# Patient Record
Sex: MALE | Race: WHITE | NOT HISPANIC OR LATINO | Employment: OTHER | URBAN - METROPOLITAN AREA
[De-identification: names, ages, dates, MRNs, and addresses within clinical notes are randomized per-mention and may not be internally consistent; named-entity substitution may affect disease eponyms.]

---

## 2017-03-09 ENCOUNTER — OFFICE VISIT (OUTPATIENT)
Dept: FAMILY MEDICINE | Facility: CLINIC | Age: 67
End: 2017-03-09
Payer: MEDICARE

## 2017-03-09 VITALS
HEIGHT: 67 IN | HEART RATE: 85 BPM | OXYGEN SATURATION: 95 % | WEIGHT: 179 LBS | DIASTOLIC BLOOD PRESSURE: 76 MMHG | SYSTOLIC BLOOD PRESSURE: 125 MMHG | BODY MASS INDEX: 28.09 KG/M2

## 2017-03-09 DIAGNOSIS — G40.909 SEIZURE DISORDER (H): ICD-10-CM

## 2017-03-09 DIAGNOSIS — R73.9 HYPERGLYCEMIA: ICD-10-CM

## 2017-03-09 DIAGNOSIS — E78.5 HYPERLIPIDEMIA LDL GOAL <130: Primary | ICD-10-CM

## 2017-03-09 PROCEDURE — 99214 OFFICE O/P EST MOD 30 MIN: CPT | Performed by: FAMILY MEDICINE

## 2017-03-09 RX ORDER — PHENYTOIN SODIUM 100 MG/1
100 CAPSULE, EXTENDED RELEASE ORAL DAILY
Qty: 90 CAPSULE | Refills: 3 | Status: SHIPPED | OUTPATIENT
Start: 2017-03-09 | End: 2018-03-05

## 2017-03-09 ASSESSMENT — PAIN SCALES - GENERAL: PAINLEVEL: NO PAIN (0)

## 2017-03-09 NOTE — MR AVS SNAPSHOT
"              After Visit Summary   3/9/2017    Jordan Kennedy    MRN: [id number]           Patient Information     Date Of Birth          1950        Visit Information        Provider Department      3/9/2017 4:00 PM Kasi Edwards MD [facility]        Today's Diagnoses     Hyperlipidemia LDL goal <130    -  1    Seizure disorder (H)        Hyperglycemia           Follow-ups after your visit        Future tests that were ordered for you today     Open Future Orders        Priority Expected Expires Ordered    Basic metabolic panel Routine 3/10/2017 4/9/2017 3/9/2017    Lipid panel reflex to direct LDL Routine 3/10/2017 4/9/2017 3/9/2017            Who to contact     If you have questions or need follow up information about today's clinic visit or your schedule please contact [facility] directly at [phone].  Normal or non-critical lab and imaging results will be communicated to you by [employer], letter or phone within 4 business days after the clinic has received the results. If you do not hear from us within 7 days, please contact the clinic through [employer] or phone. If you have a critical or abnormal lab result, we will notify you by phone as soon as possible.  Submit refill requests through [employer] or call your pharmacy and they will forward the refill request to us. Please allow 3 business days for your refill to be completed.          Additional Information About Your Visit        MyChart Information     [employer] lets you send messages to your doctor, view your test results, renew your prescriptions, schedule appointments and more. To sign up, go to [email or web address] . Click on \"Log in\" on the left side of the screen, which will take you to the Welcome page. Then click on \"Sign up Now\" on the right side of the page.     You will be asked to enter the access code listed below, as well as some personal information. Please follow the directions " "to create your username and password.     Your access code is: [email or web address]  Expires: 2017  4:31 PM     Your access code will  in 90 days. If you need help or a new code, please call your [facility] or [phone].        Care EveryWhere ID     This is your Care EveryWhere ID. This could be used by other organizations to access your [place] medical records  [id number]        Your Vitals Were     Pulse Height Pulse Oximetry BMI (Body Mass Index)          85 5' 6.5\" (1.689 m) 95% 28.46 kg/m2         Blood Pressure from Last 3 Encounters:   17 125/76   10/23/14 134/89   04/15/13 128/85    Weight from Last 3 Encounters:   17 179 lb (81.2 kg)   10/23/14 170 lb (77.1 kg)   04/15/13 173 lb 8 oz (78.7 kg)                 Where to get your medicines      These medications were sent to [id number] - [place], [place] - [address] AT [facility] [address]  [address], [place] [address]     Phone:  [phone]     phenytoin 100 MG CR capsule          Primary Care Provider    None Specified       No primary provider on file.        Thank you!     Thank you for choosing [facility]  for your care. Our goal is always to provide you with excellent care. Hearing back from our patients is one way we can continue to improve our services. Please take a few minutes to complete the written survey that you may receive in the mail after your visit with us. Thank you!             Your Updated Medication List - Protect others around you: Learn how to safely use, store and throw away your medicines at www.disposemymeds.org.          This list is accurate as of: 3/9/17  4:31 PM.  Always use your most recent med list.                   Brand Name Dispense Instructions for use    lovastatin 40 MG tablet    MEVACOR    90 tablet    Take 1 tablet (40 mg) by mouth At Bedtime       phenytoin 100 MG CR capsule    DILANTIN    90 capsule    Take 1 " capsule (100 mg) by mouth daily (Due for physical and labs for more refills)

## 2017-03-09 NOTE — NURSING NOTE
"Chief Complaint   Patient presents with     Establish Care     Recheck Medication       Initial /76 (BP Location: Left arm, Patient Position: Chair, Cuff Size: Adult Regular)  Pulse 85  Ht 5' 6.5\" (1.689 m)  Wt 179 lb (81.2 kg)  SpO2 95%  BMI 28.46 kg/m2 Estimated body mass index is 28.46 kg/(m^2) as calculated from the following:    Height as of this encounter: 5' 6.5\" (1.689 m).    Weight as of this encounter: 179 lb (81.2 kg).  Medication Reconciliation: complete   Sánchez Yeung CMA      "

## 2017-03-09 NOTE — PROGRESS NOTES
"  SUBJECTIVE:                                                    Jordan Kennedy is a 66 year old male who presents to clinic today for the following health issues:      Medication Followup of Phenytoin     Taking Medication as prescribed: yes    Side Effects:  None    Medication Helping Symptoms:  yes       Patient has a history of seizures   He has been on it for 2 months ago ; in checking the chart he has been on this for years   He falls over and he does not where he was.     Patient used to drink , but not too much   His last drink     He has not had a seizure for 50 years     His dilantin levels are subtherapeutic     O: /76 (BP Location: Left arm, Patient Position: Chair, Cuff Size: Adult Regular)  Pulse 85  Ht 5' 6.5\" (1.689 m)  Wt 179 lb (81.2 kg)  SpO2 95%  BMI 28.46 kg/m2    Head: Normocephalic, atraumatic.  Eyes: Conjunctiva clear, non icteric. PERRLA.  Ears: External ears and TMs normal BL.  Nose: Septum midline, nasal mucosa pink and moist. No discharge.  Mouth / Throat: Normal dentition.  No oral lesions. Pharynx non erythematous, tonsils without hypertrophy.  Neck: Supple, no enlarged LN, trachea midline.    Chest wall normal to inspection and palpation. Good excursion bilaterally. Lungs clear to auscultation. Good air movement bilaterally without rales, wheezes, or rhonchi.   Regular rate and  rhythm. S1 and S2 normal, no murmurs, clicks, gallops or rubs. No edema or JVD.    The abdomen is soft without tenderness, guarding, mass, rebound or organomegaly. Bowel sounds are normal. No CVA tenderness or inguinal adenopathy noted.      ICD-10-CM    1. Hyperlipidemia LDL goal <130 E78.5 Lipid panel reflex to direct LDL   2. Seizure disorder (H) G40.909 phenytoin (DILANTIN) 100 MG CR capsule   3. Hyperglycemia R73.9 Basic metabolic panel           "

## 2017-03-24 DIAGNOSIS — R73.9 HYPERGLYCEMIA: ICD-10-CM

## 2017-03-24 DIAGNOSIS — E78.5 HYPERLIPIDEMIA LDL GOAL <130: ICD-10-CM

## 2017-03-24 LAB
ANION GAP SERPL CALCULATED.3IONS-SCNC: 12 MMOL/L (ref 3–14)
BUN SERPL-MCNC: 12 MG/DL (ref 7–30)
CALCIUM SERPL-MCNC: 9.6 MG/DL (ref 8.5–10.1)
CHLORIDE SERPL-SCNC: 103 MMOL/L (ref 94–109)
CHOLEST SERPL-MCNC: 238 MG/DL
CO2 SERPL-SCNC: 26 MMOL/L (ref 20–32)
CREAT SERPL-MCNC: 0.88 MG/DL (ref 0.66–1.25)
GFR SERPL CREATININE-BSD FRML MDRD: 86 ML/MIN/1.7M2
GLUCOSE SERPL-MCNC: 100 MG/DL (ref 70–99)
HDLC SERPL-MCNC: 42 MG/DL
LDLC SERPL CALC-MCNC: 148 MG/DL
NONHDLC SERPL-MCNC: 196 MG/DL
POTASSIUM SERPL-SCNC: 4 MMOL/L (ref 3.4–5.3)
SODIUM SERPL-SCNC: 141 MMOL/L (ref 133–144)
TRIGL SERPL-MCNC: 241 MG/DL

## 2017-03-24 PROCEDURE — 36415 COLL VENOUS BLD VENIPUNCTURE: CPT | Performed by: FAMILY MEDICINE

## 2017-03-24 PROCEDURE — 80048 BASIC METABOLIC PNL TOTAL CA: CPT | Performed by: FAMILY MEDICINE

## 2017-03-24 PROCEDURE — 80061 LIPID PANEL: CPT | Performed by: FAMILY MEDICINE

## 2017-03-24 NOTE — LETTER
[facility]   [address] [place]  [address]  [phone]                                   March 31, 2017    Jordan Kennedy  [address] [facility] [address]        Dear Jordan,    Your basic metabolic panel which includes electrolytes,kidney function is normal and  -Glucose (diabetic screening test) is within normal limits  Your cholesterols are in the same rang they were 3 years ago. You should have your statn changed to a stronger medication.  Please call to discuss     Follow up in 6 month(s)    Results for orders placed or performed in visit on 03/24/17   Basic metabolic panel   Result Value Ref Range    Sodium 141 133 - 144 mmol/L    Potassium 4.0 3.4 - 5.3 mmol/L    Chloride 103 94 - 109 mmol/L    Carbon Dioxide 26 20 - 32 mmol/L    Anion Gap 12 3 - 14 mmol/L    Glucose 100 (H) 70 - 99 mg/dL    Urea Nitrogen 12 7 - 30 mg/dL    Creatinine 0.88 0.66 - 1.25 mg/dL    GFR Estimate 86 >60 mL/min/1.7m2    GFR Estimate If Black >90   GFR Calc   >60 mL/min/1.7m2    Calcium 9.6 8.5 - 10.1 mg/dL   Lipid panel reflex to direct LDL   Result Value Ref Range    Cholesterol 238 (H) <200 mg/dL    Triglycerides 241 (H) <150 mg/dL    HDL Cholesterol 42 >39 mg/dL    LDL Cholesterol Calculated 148 (H) <100 mg/dL    Non HDL Cholesterol 196 (H) <130 mg/dL       If you have any questions please call the clinic at [phone]    Sincerely,    Kasi Edwards MD  bmd

## 2017-03-31 NOTE — PROGRESS NOTES
Your basic metabolic panel which includes electrolytes,kidney function is normal and  -Glucose (diabetic screening test) is within normal limits  Your cholesterols are in the same rang they were 3 years ago. You should have your statn changed to a stronger medication.  Please call to discuss     Follow up in 6 month(s)

## 2018-03-05 ENCOUNTER — OFFICE VISIT (OUTPATIENT)
Dept: FAMILY MEDICINE | Facility: CLINIC | Age: 68
End: 2018-03-05
Payer: MEDICARE

## 2018-03-05 VITALS
HEIGHT: 67 IN | HEART RATE: 102 BPM | TEMPERATURE: 97 F | OXYGEN SATURATION: 94 % | BODY MASS INDEX: 28.41 KG/M2 | SYSTOLIC BLOOD PRESSURE: 126 MMHG | DIASTOLIC BLOOD PRESSURE: 84 MMHG | WEIGHT: 181 LBS

## 2018-03-05 DIAGNOSIS — G40.909 SEIZURE DISORDER (H): Primary | ICD-10-CM

## 2018-03-05 DIAGNOSIS — E78.5 HYPERLIPIDEMIA LDL GOAL <130: ICD-10-CM

## 2018-03-05 DIAGNOSIS — Z12.11 SPECIAL SCREENING FOR MALIGNANT NEOPLASMS, COLON: ICD-10-CM

## 2018-03-05 LAB
ANION GAP SERPL CALCULATED.3IONS-SCNC: 9 MMOL/L (ref 3–14)
BUN SERPL-MCNC: 21 MG/DL (ref 7–30)
CALCIUM SERPL-MCNC: 9.1 MG/DL (ref 8.5–10.1)
CHLORIDE SERPL-SCNC: 108 MMOL/L (ref 94–109)
CHOLEST SERPL-MCNC: 225 MG/DL
CO2 SERPL-SCNC: 24 MMOL/L (ref 20–32)
CREAT SERPL-MCNC: 0.86 MG/DL (ref 0.66–1.25)
GFR SERPL CREATININE-BSD FRML MDRD: 88 ML/MIN/1.7M2
GLUCOSE SERPL-MCNC: 106 MG/DL (ref 70–99)
HDLC SERPL-MCNC: 42 MG/DL
LDLC SERPL CALC-MCNC: 142 MG/DL
NONHDLC SERPL-MCNC: 183 MG/DL
PHENYTOIN SERPL-MCNC: 3.8 MG/L (ref 10–20)
POTASSIUM SERPL-SCNC: 3.9 MMOL/L (ref 3.4–5.3)
SODIUM SERPL-SCNC: 141 MMOL/L (ref 133–144)
TRIGL SERPL-MCNC: 205 MG/DL

## 2018-03-05 PROCEDURE — 80185 ASSAY OF PHENYTOIN TOTAL: CPT | Performed by: FAMILY MEDICINE

## 2018-03-05 PROCEDURE — 36415 COLL VENOUS BLD VENIPUNCTURE: CPT | Performed by: FAMILY MEDICINE

## 2018-03-05 PROCEDURE — 99213 OFFICE O/P EST LOW 20 MIN: CPT | Performed by: FAMILY MEDICINE

## 2018-03-05 PROCEDURE — 80061 LIPID PANEL: CPT | Performed by: FAMILY MEDICINE

## 2018-03-05 PROCEDURE — 80048 BASIC METABOLIC PNL TOTAL CA: CPT | Performed by: FAMILY MEDICINE

## 2018-03-05 RX ORDER — PHENYTOIN SODIUM 100 MG/1
100 CAPSULE, EXTENDED RELEASE ORAL DAILY
Qty: 90 CAPSULE | Refills: 3 | Status: SHIPPED | OUTPATIENT
Start: 2018-03-05 | End: 2019-03-02

## 2018-03-05 RX ORDER — MULTIVITAMIN
1 TABLET ORAL DAILY
COMMUNITY

## 2018-03-05 NOTE — LETTER
[facility]  [address] [place]  [address]  [phone]        March 6, 2018    Jordan Kennedy  [address] [facility] [address]        Dear Jordan,    Your dilantin level remains quite low.  You are at risk for having a seizure if this is not normal   Please verify that you are taking your dose of medication so we can make adjustments     Your basic metabolic panel which includes electrolytes,kidney function is normal and  -Glucose (diabetic screening test) is elevated, mildly     Your cholesterols remain in the moderately elevated level   You need to restart your mevacor and recheck this in three months     Follow up in 3 month(s)    Results for orders placed or performed in visit on 03/05/18   Basic metabolic panel   Result Value Ref Range    Sodium 141 133 - 144 mmol/L    Potassium 3.9 3.4 - 5.3 mmol/L    Chloride 108 94 - 109 mmol/L    Carbon Dioxide 24 20 - 32 mmol/L    Anion Gap 9 3 - 14 mmol/L    Glucose 106 (H) 70 - 99 mg/dL    Urea Nitrogen 21 7 - 30 mg/dL    Creatinine 0.86 0.66 - 1.25 mg/dL    GFR Estimate 88 >60 mL/min/1.7m2    GFR Estimate If Black >90 >60 mL/min/1.7m2    Calcium 9.1 8.5 - 10.1 mg/dL   Lipid panel reflex to direct LDL Fasting   Result Value Ref Range    Cholesterol 225 (H) <200 mg/dL    Triglycerides 205 (H) <150 mg/dL    HDL Cholesterol 42 >39 mg/dL    LDL Cholesterol Calculated 142 (H) <100 mg/dL    Non HDL Cholesterol 183 (H) <130 mg/dL   Phenytoin level   Result Value Ref Range    Phenytoin Level 3.8 (L) 10 - 20 mg/L       If you have any questions please call the clinic at [phone].    Sincerely,    Kasi Edwards MD  NMW

## 2018-03-05 NOTE — NURSING NOTE
"Chief Complaint   Patient presents with     Recheck Medication       Initial BP (!) 136/93 (BP Location: Right arm, Patient Position: Chair, Cuff Size: Adult Regular)  Pulse 102  Temp 97  F (36.1  C) (Oral)  Ht 5' 7.32\" (1.71 m)  Wt 181 lb (82.1 kg)  SpO2 94%  BMI 28.08 kg/m2 Estimated body mass index is 28.08 kg/(m^2) as calculated from the following:    Height as of this encounter: 5' 7.32\" (1.71 m).    Weight as of this encounter: 181 lb (82.1 kg).  Medication Reconciliation: complete   Aurelia See ERIN Dupree      "

## 2018-03-05 NOTE — MR AVS SNAPSHOT
"              After Visit Summary   3/5/2018    Jordan Kennedy    MRN: [id number]           Patient Information     Date Of Birth          1950        Visit Information        Provider Department      3/5/2018 8:40 AM [facility]        Today's Diagnoses     Seizure disorder (H)    -  1    Hyperlipidemia LDL goal <130        Special screening for malignant neoplasms, colon           Follow-ups after your visit        Future tests that were ordered for you today     Open Future Orders        Priority Expected Expires Ordered    Fecal colorectal cancer screen (FIT) Routine 3/26/2018 5/28/2018 3/5/2018            Who to contact     If you have questions or need follow up information about today's clinic visit or your schedule please contact [facility] directly at [phone].  Normal or non-critical lab and imaging results will be communicated to you by MyChart, letter or phone within 4 business days after the clinic has received the results. If you do not hear from us within 7 days, please contact the clinic through MyChart or phone. If you have a critical or abnormal lab result, we will notify you by phone as soon as possible.  Submit refill requests through [employer] or call your pharmacy and they will forward the refill request to us. Please allow 3 business days for your refill to be completed.          Additional Information About Your Visit        MyChart Information     [employer] lets you send messages to your doctor, view your test results, renew your prescriptions, schedule appointments and more. To sign up, go to [email or web address] . Click on \"Log in\" on the left side of the screen, which will take you to the Welcome page. Then click on \"Sign up Now\" on the right side of the page.     You will be asked to enter the access code listed below, as well as some personal information. Please follow the directions to create your " "username and password.     Your access code is: [email or web address]  Expires: 6/3/2018  8:54 AM     Your access code will  in 90 days. If you need help or a new code, please call your [place] clinic or [phone].        Care EveryWhere ID     This is your Care EveryWhere ID. This could be used by other organizations to access your [place] medical records  [id number]        Your Vitals Were     Pulse Temperature Height Pulse Oximetry BMI (Body Mass Index)       102 97  F (36.1  C) (Oral) 5' 7.32\" (1.71 m) 94% 28.08 kg/m2        Blood Pressure from Last 3 Encounters:   18 126/84   17 125/76   10/23/14 134/89    Weight from Last 3 Encounters:   18 181 lb (82.1 kg)   17 179 lb (81.2 kg)   10/23/14 170 lb (77.1 kg)              We Performed the Following     Basic metabolic panel     Lipid panel reflex to direct LDL Fasting     Phenytoin level          Where to get your medicines      These medications were sent to [id number] - [place], [place] - [address] AT [facility] [address]  [address], [place] [address]     Phone:  [phone]     phenytoin 100 MG CR capsule          Primary Care Provider Office Phone # Fax #    Kasi Edwards [phone]       [address] [facility] [address]        Equal Access to Services     CHACORTA SIU AH: Hadii vicki ku hadasho Soomaali, waaxda luqadaha, qaybta kaalmada adeegyada, nannette smith adeviky kovacs. So [facility] [phone].    ATENCIÓN: Si habla español, tiene a canada disposición servicios gratuitos de asistencia lingüística. Llame al [phone].    We comply with applicable federal civil rights laws and [place] laws. We do not discriminate on the basis of race, color, national origin, age, disability, sex, sexual orientation, or gender identity.            Thank you!     Thank you for choosing [facility]  for your care. Our " goal is always to provide you with excellent care. Hearing back from our patients is one way we can continue to improve our services. Please take a few minutes to complete the written survey that you may receive in the mail after your visit with us. Thank you!             Your Updated Medication List - Protect others around you: Learn how to safely use, store and throw away your medicines at www.disposemymeds.org.          This list is accurate as of 3/5/18  8:54 AM.  Always use your most recent med list.                   Brand Name Dispense Instructions for use Diagnosis    lovastatin 40 MG tablet    MEVACOR    90 tablet    Take 1 tablet (40 mg) by mouth At Bedtime    Hyperlipidemia LDL goal <130       Multi-vitamin Tabs tablet      Take 1 tablet by mouth daily        phenytoin 100 MG CR capsule    DILANTIN    90 capsule    Take 1 capsule (100 mg) by mouth daily (Due for physical and labs for more refills)    Seizure disorder (H)

## 2018-03-05 NOTE — PROGRESS NOTES
"  SUBJECTIVE:   Jordan Kennedy is a 67 year old male who presents to clinic today for the following health issues:      Medication Followup    Taking Medication as prescribed: yes    Side Effects:  None    Medication Helping Symptoms:  yes       History of seizures   Currently on Dilantin     Ros: no chest pain, chest tightness   No sob   No leg edema   No abdominal pain     Denies fever or chills   Weight stable     Denies myalgias or bloody urine           Current Outpatient Prescriptions   Medication Sig Dispense Refill     multivitamin, therapeutic with minerals (MULTI-VITAMIN) TABS tablet Take 1 tablet by mouth daily       phenytoin (DILANTIN) 100 MG CR capsule Take 1 capsule (100 mg) by mouth daily (Due for physical and labs for more refills) 90 capsule 3     lovastatin (MEVACOR) 40 MG tablet Take 1 tablet (40 mg) by mouth At Bedtime (Patient not taking: Reported on 3/5/2018) 90 tablet 3         Reviewed and updated as needed this visit by clinical staff  Tobacco  Allergies  Meds  Med Hx  Surg Hx  Fam Hx  Soc Hx      Reviewed and updated as needed this visit by Provider  Meds        Past Medical History:   Diagnosis Date     Seizure disorder (H)        History reviewed. No pertinent surgical history.    Family History   Problem Relation Age of Onset     CANCER Mother      DIABETES Father      Mom had a smoking related cancer     Social History   Substance Use Topics     Smoking status: Never Smoker     Smokeless tobacco: Never Used     Alcohol use Yes      Comment: Once in a while have a beer           O; /84  Pulse 102  Temp 97  F (36.1  C) (Oral)  Ht 5' 7.32\" (1.71 m)  Wt 181 lb (82.1 kg)  SpO2 94%  BMI 28.08 kg/m2    Wt Readings from Last 4 Encounters:   03/05/18 181 lb (82.1 kg)   03/09/17 179 lb (81.2 kg)   10/23/14 170 lb (77.1 kg)   04/15/13 173 lb 8 oz (78.7 kg)       Chest wall normal to inspection and palpation. Good excursion bilaterally. Lungs clear to auscultation. Good air " "movement bilaterally without rales, wheezes, or rhonchi.   Regular rate and  rhythm. S1 and S2 normal, no murmurs, clicks, gallops or rubs. No edema or JVD.    The abdomen is soft without tenderness, guarding, mass, rebound or organomegaly. Bowel sounds are normal. No CVA tenderness or inguinal adenopathy noted.      ICD-10-CM    1. Seizure disorder (H) G40.909 Basic metabolic panel     Phenytoin level     phenytoin (DILANTIN) 100 MG CR capsule   2. Hyperlipidemia LDL goal <130 E78.5 Lipid panel reflex to direct LDL Fasting   3. Special screening for malignant neoplasms, colon Z12.11 Fecal colorectal cancer screen (FIT)       Patient is not taking his lovastatin. \"he does not need it\"   We discussed and wait til labs back           "

## 2018-03-06 NOTE — PROGRESS NOTES
Your dilantin level remains quite low.  You are at risk for having a seizure if this is not normal   Please verify that you are taking your dose of medication so we can make adjustments     Your basic metabolic panel which includes electrolytes,kidney function is normal and  -Glucose (diabetic screening test) is elevated, mildly     Your cholesterols remain in the moderately elevated level   You need to restart your mevacor and recheck this in three months       Follow up in 3 month(s)

## 2018-03-12 DIAGNOSIS — Z12.11 SPECIAL SCREENING FOR MALIGNANT NEOPLASMS, COLON: ICD-10-CM

## 2018-03-12 PROCEDURE — 82274 ASSAY TEST FOR BLOOD FECAL: CPT | Performed by: FAMILY MEDICINE

## 2018-03-12 NOTE — LETTER
[facility]   [address] [place]  [address]  [phone]                                   March 15, 2018    Jordan Kennedy  [address] [facility] [address]        Dear Jordan,    Your FIT testing was normal.   I still recommend that we do colonoscopy since it is the well established screening tool.   There are risks involved with doing a colonoscopy and so since you agreed to do FIT testing, you should continue to do it  yearly for five years.   You need to do it regularly to achieve the same level of confidence as the colonoscopy.  When done correctly, this can be substituted for the colonoscopy  If it turns positive then we would have to proceed with colonoscopy to further evaluate.     Results for orders placed or performed in visit on 03/12/18   Fecal colorectal cancer screen (FIT)   Result Value Ref Range    Occult Blood Scn FIT Negative NEG^Negative       If you have any questions please call the clinic at [phone]    Sincerely,    Kasi Edwards MD  bmd

## 2018-03-15 LAB — HEMOCCULT STL QL IA: NEGATIVE

## 2019-03-02 DIAGNOSIS — G40.909 SEIZURE DISORDER (H): ICD-10-CM

## 2019-03-04 ENCOUNTER — OFFICE VISIT (OUTPATIENT)
Dept: FAMILY MEDICINE | Facility: CLINIC | Age: 69
End: 2019-03-04
Payer: MEDICARE

## 2019-03-04 VITALS
OXYGEN SATURATION: 95 % | DIASTOLIC BLOOD PRESSURE: 81 MMHG | SYSTOLIC BLOOD PRESSURE: 134 MMHG | WEIGHT: 180 LBS | BODY MASS INDEX: 28.25 KG/M2 | TEMPERATURE: 97 F | HEIGHT: 67 IN | HEART RATE: 97 BPM

## 2019-03-04 DIAGNOSIS — E78.5 HYPERLIPIDEMIA LDL GOAL <130: Primary | ICD-10-CM

## 2019-03-04 DIAGNOSIS — G40.909 SEIZURE DISORDER (H): ICD-10-CM

## 2019-03-04 PROCEDURE — 99214 OFFICE O/P EST MOD 30 MIN: CPT | Performed by: FAMILY MEDICINE

## 2019-03-04 RX ORDER — LOVASTATIN 40 MG
40 TABLET ORAL AT BEDTIME
Qty: 90 TABLET | Refills: 0 | Status: SHIPPED | OUTPATIENT
Start: 2019-03-04 | End: 2020-03-02

## 2019-03-04 RX ORDER — PHENYTOIN SODIUM 100 MG/1
CAPSULE, EXTENDED RELEASE ORAL
Qty: 90 CAPSULE | Refills: 0 | Status: SHIPPED | OUTPATIENT
Start: 2019-03-04 | End: 2019-08-24

## 2019-03-04 RX ORDER — PHENYTOIN SODIUM 100 MG/1
CAPSULE, EXTENDED RELEASE ORAL
Qty: 90 CAPSULE | Refills: 0 | Status: SHIPPED | OUTPATIENT
Start: 2019-03-04 | End: 2019-03-04

## 2019-03-04 ASSESSMENT — MIFFLIN-ST. JEOR: SCORE: 1544.06

## 2019-03-04 NOTE — TELEPHONE ENCOUNTER
"Requested Prescriptions   Pending Prescriptions Disp Refills     phenytoin (DILANTIN) 100 MG capsule [Pharmacy Med Name: PHENYTOIN SODIUM 100MG EXT CAPSULES] 90 capsule 0    Last Written Prescription Date:  3/5/18  Last Fill Quantity: 90,  # refills: 3   Last office visit: 3/5/2018 with prescribing provider:     Future Office Visit:     Sig: TAKE 1 CAPSULE(100 MG) BY MOUTH DAILY    Anti-Seizure Meds Protocol  Failed - 3/2/2019  9:35 AM       Failed - Review Authorizing provider's last note.     Refer to last progress notes: confirm request is for original authorizing provider (cannot be through other providers).         Failed - Normal CBC on file in past 26 months    Recent Labs   Lab Test 03/24/11  1009   WBC 4.0   RBC 5.23   HGB 16.4   HCT 49.9                   Failed - Normal ALT or AST on file in past 26 months    Recent Labs   Lab Test 04/15/13  0849   ALT 42     Recent Labs   Lab Test 03/24/11  1009   AST 24            Failed - Normal platelet count on file in past 26 months    Recent Labs   Lab Test 03/24/11  1009                 Passed - Recent (12 mo) or future (30 days) visit within the authorizing provider's specialty    Patient had office visit in the last 12 months or has a visit in the next 30 days with authorizing provider or within the authorizing provider's specialty.  See \"Patient Info\" tab in inbasket, or \"Choose Columns\" in Meds & Orders section of the refill encounter.             Passed - Dilantin level within therapeutic range in past 26 months    Recent Labs   Lab Test 03/05/18  0856   DILANTIN 3.8*       Dilantin level must be checked 2-4 weeks after dosage change.         Passed - Medication is active on med list          "

## 2019-03-04 NOTE — PROGRESS NOTES
"  SUBJECTIVE:   Jordan Kennedy is a 68 year old male who presents to clinic today for the following health issues:      Medication Followup of Dilantin    Taking Medication as prescribed: yes    Side Effects:  None    Medication Helping Symptoms:  yes     Last seizure was years ago    He only takes one pill per day     He no longer sees a neurologist     Patient is taking B vitamins       Ros: no chest pain, chest tightness   No sob   No leg edema   No abdominal pain     Denies fever or chills   Weight stable       O: /81 (BP Location: Right arm, Patient Position: Sitting, Cuff Size: Adult Regular)   Pulse 97   Temp 97  F (36.1  C) (Oral)   Ht 1.708 m (5' 7.25\")   Wt 81.6 kg (180 lb)   SpO2 95%   BMI 27.98 kg/m      Wt Readings from Last 4 Encounters:   03/04/19 81.6 kg (180 lb)   03/05/18 82.1 kg (181 lb)   03/09/17 81.2 kg (179 lb)   10/23/14 77.1 kg (170 lb)     Head: Normocephalic, atraumatic.  Eyes: Conjunctiva clear, non icteric. PERRLA.  Ears: External ears and TMs normal BL.  Nose: Septum midline, nasal mucosa pink and moist. No discharge.  Mouth / Throat: Normal dentition.  No oral lesions. Pharynx non erythematous, tonsils without hypertrophy.  Neck: Supple, no enlarged LN, trachea midline.    No bruits in the neck     Chest wall normal to inspection and palpation. Good excursion bilaterally. Lungs clear to auscultation. Good air movement bilaterally without rales, wheezes, or rhonchi.   Regular rate and  rhythm. S1 and S2 normal, no murmurs, clicks, gallops or rubs. No edema or JVD.    The abdomen is soft without tenderness, guarding, mass, rebound or organomegaly. Bowel sounds are normal. No CVA tenderness or inguinal adenopathy noted.    Complains of knee pain, but no swelling       ICD-10-CM    1. Hyperlipidemia LDL goal <130 E78.5 Lipid panel reflex to direct LDL Fasting     ALT     AST     lovastatin (MEVACOR) 40 MG tablet   2. Seizure disorder (H) G40.909 Basic metabolic panel     " Phenytoin level     phenytoin (DILANTIN) 100 MG capsule       Problem list and histories reviewed & adjusted, as indicated.      Reviewed and updated as needed this visit by clinical staff       Reviewed and updated as needed this visit by Provider

## 2019-03-13 DIAGNOSIS — E78.5 HYPERLIPIDEMIA LDL GOAL <130: ICD-10-CM

## 2019-03-13 DIAGNOSIS — G40.909 SEIZURE DISORDER (H): ICD-10-CM

## 2019-03-13 LAB
ALT SERPL W P-5'-P-CCNC: 36 U/L (ref 0–70)
ANION GAP SERPL CALCULATED.3IONS-SCNC: 9 MMOL/L (ref 3–14)
AST SERPL W P-5'-P-CCNC: 21 U/L (ref 0–45)
BUN SERPL-MCNC: 12 MG/DL (ref 7–30)
CALCIUM SERPL-MCNC: 9.5 MG/DL (ref 8.5–10.1)
CHLORIDE SERPL-SCNC: 106 MMOL/L (ref 94–109)
CHOLEST SERPL-MCNC: 167 MG/DL
CO2 SERPL-SCNC: 26 MMOL/L (ref 20–32)
CREAT SERPL-MCNC: 0.87 MG/DL (ref 0.66–1.25)
GFR SERPL CREATININE-BSD FRML MDRD: 88 ML/MIN/{1.73_M2}
GLUCOSE SERPL-MCNC: 103 MG/DL (ref 70–99)
HDLC SERPL-MCNC: 40 MG/DL
LDLC SERPL CALC-MCNC: 91 MG/DL
NONHDLC SERPL-MCNC: 127 MG/DL
PHENYTOIN SERPL-MCNC: 3.3 MG/L (ref 10–20)
POTASSIUM SERPL-SCNC: 4.3 MMOL/L (ref 3.4–5.3)
SODIUM SERPL-SCNC: 141 MMOL/L (ref 133–144)
TRIGL SERPL-MCNC: 182 MG/DL

## 2019-03-13 PROCEDURE — 80185 ASSAY OF PHENYTOIN TOTAL: CPT | Performed by: FAMILY MEDICINE

## 2019-03-13 PROCEDURE — 36415 COLL VENOUS BLD VENIPUNCTURE: CPT | Performed by: FAMILY MEDICINE

## 2019-03-13 PROCEDURE — 80048 BASIC METABOLIC PNL TOTAL CA: CPT | Performed by: FAMILY MEDICINE

## 2019-03-13 PROCEDURE — 84450 TRANSFERASE (AST) (SGOT): CPT | Performed by: FAMILY MEDICINE

## 2019-03-13 PROCEDURE — 84460 ALANINE AMINO (ALT) (SGPT): CPT | Performed by: FAMILY MEDICINE

## 2019-03-13 PROCEDURE — 80061 LIPID PANEL: CPT | Performed by: FAMILY MEDICINE

## 2019-03-13 NOTE — LETTER
[facility]   [address] [place]  [address]  [phone]                                   March 19, 2019    Jordan Kennedy  [address] [facility] [address]        Dear Jordan,    Your Dilantin level is low.  We have discussed this in the past that it is better to have this in a therapeutic level.  Your basic metabolic shows your electrolytes are normal.  Glucose minimally elevated.  Liver function tests are normal.  Your cholesterols are basically normal.  With minimally elevated triglycerides.  She is usually related to starch intake.    Results for orders placed or performed in visit on 03/13/19   Phenytoin level   Result Value Ref Range    Phenytoin Level 3.3 (L) 10 - 20 mg/L   AST   Result Value Ref Range    AST 21 0 - 45 U/L   ALT   Result Value Ref Range    ALT 36 0 - 70 U/L   Lipid panel reflex to direct LDL Fasting   Result Value Ref Range    Cholesterol 167 <200 mg/dL    Triglycerides 182 (H) <150 mg/dL    HDL Cholesterol 40 >39 mg/dL    LDL Cholesterol Calculated 91 <100 mg/dL    Non HDL Cholesterol 127 <130 mg/dL   Basic metabolic panel   Result Value Ref Range    Sodium 141 133 - 144 mmol/L    Potassium 4.3 3.4 - 5.3 mmol/L    Chloride 106 94 - 109 mmol/L    Carbon Dioxide 26 20 - 32 mmol/L    Anion Gap 9 3 - 14 mmol/L    Glucose 103 (H) 70 - 99 mg/dL    Urea Nitrogen 12 7 - 30 mg/dL    Creatinine 0.87 0.66 - 1.25 mg/dL    GFR Estimate 88 >60 mL/min/[1.73_m2]    GFR Estimate If Black >90 >60 mL/min/[1.73_m2]    Calcium 9.5 8.5 - 10.1 mg/dL       If you have any questions please call the clinic at [phone]    Sincerely,    Kasi Edwards MD   bmd

## 2019-03-18 NOTE — RESULT ENCOUNTER NOTE
Your Dilantin level is low.  We have discussed this in the past that it is better to have this in a therapeutic level.  Your basic metabolic shows your electrolytes are normal.  Glucose minimally elevated.  Liver function tests are normal.  Your cholesterols are basically normal.  With minimally elevated triglycerides.  She is usually related to starch intake.

## 2019-08-24 DIAGNOSIS — G40.909 SEIZURE DISORDER (H): ICD-10-CM

## 2019-08-26 RX ORDER — PHENYTOIN SODIUM 100 MG/1
CAPSULE, EXTENDED RELEASE ORAL
Qty: 90 CAPSULE | Refills: 0 | Status: SHIPPED | OUTPATIENT
Start: 2019-08-26 | End: 2019-11-23

## 2019-08-26 NOTE — TELEPHONE ENCOUNTER
"Requested Prescriptions   Pending Prescriptions Disp Refills     phenytoin (DILANTIN) 100 MG capsule [Pharmacy Med Name: PHENYTOIN SODIUM 100MG EXT CAPSULES] 90 capsule 0     Sig: TAKE 1 CAPSULE(100 MG) BY MOUTH DAILY   Last Written Prescription Date:  3-4-19  Last Fill Quantity: 90,  # refills: 0   Last office visit: 3/4/2019 with prescribing provider:  3-4-19   Future Office Visit:        Anti-Seizure Meds Protocol  Failed - 8/24/2019  9:01 AM        Failed - Review Authorizing provider's last note.      Refer to last progress notes: confirm request is for original authorizing provider (cannot be through other providers).          Failed - Normal CBC on file in past 26 months     No lab results found.              Failed - Normal platelet count on file in past 26 months     No lab results found.            Failed - Dilantin level within therapeutic range in past 26 months     Recent Labs   Lab Test 03/13/19  1118   DILANTIN 3.3*       Dilantin level must be checked 2-4 weeks after dosage change.          Passed - Recent (12 mo) or future (30 days) visit within the authorizing provider's specialty     Patient had office visit in the last 12 months or has a visit in the next 30 days with authorizing provider or within the authorizing provider's specialty.  See \"Patient Info\" tab in inbasket, or \"Choose Columns\" in Meds & Orders section of the refill encounter.              Passed - Normal ALT or AST on file in past 26 months     Recent Labs   Lab Test 03/13/19  1118   ALT 36     Recent Labs   Lab Test 03/13/19  1118   AST 21             Passed - Medication is active on med list          "
Unable to fill per protocol. Routing to PCP.  Deirdre Mcdaniels RN    
Left arm;

## 2020-02-22 DIAGNOSIS — G40.909 SEIZURE DISORDER (H): ICD-10-CM

## 2020-02-24 NOTE — TELEPHONE ENCOUNTER
"Requested Prescriptions   Pending Prescriptions Disp Refills     phenytoin (DILANTIN) 100 MG capsule [Pharmacy Med Name: PHENYTOIN SODIUM 100MG EXT CAPSULES] 90 capsule 0     Sig: TAKE 1 CAPSULE(100 MG) BY MOUTH DAILY   Last Written Prescription Date:  11/26/19  Last Fill Quantity: 90,  # refills: 0   Last office visit: 3/4/2019 with prescribing provider:     Future Office Visit:        Anti-Seizure Meds Protocol  Failed - 2/22/2020  9:40 AM        Failed - Review Authorizing provider's last note.      Refer to last progress notes: confirm request is for original authorizing provider (cannot be through other providers).          Failed - Normal CBC on file in past 26 months     No lab results found.              Failed - Normal platelet count on file in past 26 months     No lab results found.            Failed - Dilantin level within therapeutic range in past 26 months     Recent Labs   Lab Test 03/13/19  1118   DILANTIN 3.3*       Dilantin level must be checked 2-4 weeks after dosage change.          Passed - Recent (12 mo) or future (30 days) visit within the authorizing provider's specialty     Patient has had an office visit with the authorizing provider or a provider within the authorizing providers department within the previous 12 mos or has a future within next 30 days. See \"Patient Info\" tab in inbasket, or \"Choose Columns\" in Meds & Orders section of the refill encounter.              Passed - Normal ALT or AST on file in past 26 months     Recent Labs   Lab Test 03/13/19  1118   ALT 36     Recent Labs   Lab Test 03/13/19  1118   AST 21             Passed - Medication is active on med list          "

## 2020-02-25 ENCOUNTER — TELEPHONE (OUTPATIENT)
Dept: FAMILY MEDICINE | Facility: CLINIC | Age: 70
End: 2020-02-25

## 2020-02-25 RX ORDER — PHENYTOIN SODIUM 100 MG/1
CAPSULE, EXTENDED RELEASE ORAL
Qty: 90 CAPSULE | Refills: 0 | Status: SHIPPED | OUTPATIENT
Start: 2020-02-25 | End: 2020-02-27

## 2020-02-25 NOTE — TELEPHONE ENCOUNTER
Kasi Edwards MD           2:06 PM   Note      Patient is due for a recheck for his seizure disorder.  I have approved the scription for Dilantin this 1 time.  Patient is to be seen within the next 3 months and he is due for lab work also.  Call patient have him schedule an appointment.

## 2020-02-25 NOTE — TELEPHONE ENCOUNTER
Patient is due for a recheck for his seizure disorder.  I have approved the scription for Dilantin this 1 time.  Patient is to be seen within the next 3 months and he is due for lab work also.  Call patient have him schedule an appointment.

## 2020-02-27 ENCOUNTER — OFFICE VISIT (OUTPATIENT)
Dept: FAMILY MEDICINE | Facility: CLINIC | Age: 70
End: 2020-02-27
Payer: MEDICARE

## 2020-02-27 VITALS
SYSTOLIC BLOOD PRESSURE: 131 MMHG | WEIGHT: 183 LBS | BODY MASS INDEX: 27.74 KG/M2 | HEIGHT: 68 IN | HEART RATE: 108 BPM | DIASTOLIC BLOOD PRESSURE: 76 MMHG | OXYGEN SATURATION: 96 % | TEMPERATURE: 97.1 F

## 2020-02-27 DIAGNOSIS — E78.5 HYPERLIPIDEMIA LDL GOAL <100: Primary | ICD-10-CM

## 2020-02-27 DIAGNOSIS — G40.909 SEIZURE DISORDER (H): ICD-10-CM

## 2020-02-27 PROCEDURE — 99213 OFFICE O/P EST LOW 20 MIN: CPT | Performed by: FAMILY MEDICINE

## 2020-02-27 RX ORDER — PHENYTOIN SODIUM 100 MG/1
100 CAPSULE, EXTENDED RELEASE ORAL DAILY
Qty: 90 CAPSULE | Refills: 3 | Status: SHIPPED | OUTPATIENT
Start: 2020-02-27 | End: 2021-05-17

## 2020-02-27 ASSESSMENT — PAIN SCALES - GENERAL: PAINLEVEL: NO PAIN (0)

## 2020-02-27 ASSESSMENT — MIFFLIN-ST. JEOR: SCORE: 1556.64

## 2020-02-27 NOTE — PROGRESS NOTES
"Subjective     Jordan Kennedy is a 69 year old male who presents to clinic today for the following health issues:    HPI     Medication Followup of Dilantin    Taking Medication as prescribed: yes    Side Effects:  None    Medication Helping Symptoms:  Yes    Side note patient has stopped his Lovastatin      He stopped his cholesterol pill over 6 months ago   He is not fasting today   Reviewed and updated as needed this visit by Provider         Patient does not drive   He has not had a seizure in years     Ros: no chest pain, chest tightness   No sob   No leg edema   No abdominal pain     Denies fever or chills   Weight stable           Objective    /76 (BP Location: Left arm, Patient Position: Chair, Cuff Size: Adult Regular)   Pulse 108   Temp 97.1  F (36.2  C) (Oral)   Ht 1.715 m (5' 7.5\")   Wt 83 kg (183 lb)   SpO2 96%   BMI 28.24 kg/m    Body mass index is 28.24 kg/m .  Physical Exam     Head: Normocephalic, atraumatic.  Eyes: Conjunctiva clear, non icteric. PERRLA.  Ears: External ears and TMs normal BL.right ear blocked   Nose: Septum midline, nasal mucosa pink and moist. No discharge.  Mouth / Throat: Normal dentition.  No oral lesions. Pharynx non erythematous, tonsils without hypertrophy.  Neck: Supple, no enlarged LN, trachea midline.      Chest wall normal to inspection and palpation. Good excursion bilaterally. Lungs clear to auscultation. Good air movement bilaterally without rales, wheezes, or rhonchi.   Regular rate and  rhythm. S1 and S2 normal, no murmurs, clicks, gallops or rubs. No edema or JVD.    The abdomen is soft without tenderness, guarding, mass, rebound or organomegaly. Bowel sounds are normal. No CVA tenderness or inguinal adenopathy noted.      ICD-10-CM    1. Hyperlipidemia LDL goal <100 E78.5 Lipid panel reflex to direct LDL Fasting   2. Seizure disorder (H) G40.909 phenytoin (DILANTIN) 100 MG capsule     Keep patient on same meds   No history of seizures with probable " sub therapeutic dosing of dilantin.  Would not change anything since seizure free

## 2020-02-28 DIAGNOSIS — E78.5 HYPERLIPIDEMIA LDL GOAL <100: ICD-10-CM

## 2020-02-28 LAB
CHOLEST SERPL-MCNC: 226 MG/DL
HDLC SERPL-MCNC: 40 MG/DL
LDLC SERPL CALC-MCNC: 144 MG/DL
NONHDLC SERPL-MCNC: 186 MG/DL
TRIGL SERPL-MCNC: 209 MG/DL

## 2020-02-28 PROCEDURE — 36415 COLL VENOUS BLD VENIPUNCTURE: CPT | Performed by: FAMILY MEDICINE

## 2020-02-28 PROCEDURE — 80061 LIPID PANEL: CPT | Performed by: FAMILY MEDICINE

## 2020-03-02 ENCOUNTER — TELEPHONE (OUTPATIENT)
Dept: FAMILY MEDICINE | Facility: CLINIC | Age: 70
End: 2020-03-02

## 2020-03-02 DIAGNOSIS — E78.5 HYPERLIPIDEMIA LDL GOAL <130: ICD-10-CM

## 2020-03-02 RX ORDER — LOVASTATIN 40 MG
40 TABLET ORAL AT BEDTIME
Qty: 90 TABLET | Refills: 0 | Status: SHIPPED | OUTPATIENT
Start: 2020-03-02 | End: 2021-08-25

## 2020-03-02 NOTE — TELEPHONE ENCOUNTER
----- Message from Mehreen Strange MD sent at 2/29/2020 11:43 AM CST -----  Please inform patient of his cholesterol, his LDL is elevated.  Total cholesterol elevated.    Advised patient with diet, daily exercise, weight loss.    Advised patient to restart his lovastatin 40 mg 1 tablet at bedtime.  If patient is not willing to take statin with concern of side effect,   please asked the patient if he is willing to try something different.  And I could send him a new prescription for  Zetia or fenofibrate.  thanks

## 2021-05-15 DIAGNOSIS — G40.909 SEIZURE DISORDER (H): ICD-10-CM

## 2021-05-17 RX ORDER — PHENYTOIN SODIUM 100 MG/1
100 CAPSULE, EXTENDED RELEASE ORAL DAILY
Qty: 90 CAPSULE | Refills: 0 | Status: SHIPPED | OUTPATIENT
Start: 2021-05-17 | End: 2021-08-25

## 2021-08-14 DIAGNOSIS — G40.909 SEIZURE DISORDER (H): ICD-10-CM

## 2021-08-15 RX ORDER — PHENYTOIN SODIUM 100 MG/1
CAPSULE, EXTENDED RELEASE ORAL
Qty: 90 CAPSULE | Refills: 0 | OUTPATIENT
Start: 2021-08-15

## 2021-08-25 ENCOUNTER — OFFICE VISIT (OUTPATIENT)
Dept: FAMILY MEDICINE | Facility: CLINIC | Age: 71
End: 2021-08-25
Payer: MEDICARE

## 2021-08-25 VITALS
RESPIRATION RATE: 26 BRPM | WEIGHT: 177 LBS | HEIGHT: 67 IN | DIASTOLIC BLOOD PRESSURE: 81 MMHG | BODY MASS INDEX: 27.78 KG/M2 | OXYGEN SATURATION: 97 % | HEART RATE: 99 BPM | TEMPERATURE: 97.8 F | SYSTOLIC BLOOD PRESSURE: 129 MMHG

## 2021-08-25 DIAGNOSIS — G40.909 SEIZURE DISORDER (H): ICD-10-CM

## 2021-08-25 DIAGNOSIS — E78.5 HYPERLIPIDEMIA LDL GOAL <130: Primary | ICD-10-CM

## 2021-08-25 PROCEDURE — 99214 OFFICE O/P EST MOD 30 MIN: CPT | Performed by: FAMILY MEDICINE

## 2021-08-25 RX ORDER — PHENYTOIN SODIUM 100 MG/1
100 CAPSULE, EXTENDED RELEASE ORAL DAILY
Qty: 90 CAPSULE | Refills: 3 | Status: SHIPPED | OUTPATIENT
Start: 2021-08-25 | End: 2021-11-10

## 2021-08-25 RX ORDER — LOVASTATIN 40 MG
40 TABLET ORAL AT BEDTIME
Qty: 90 TABLET | Refills: 3 | Status: SHIPPED | OUTPATIENT
Start: 2021-08-25 | End: 2021-11-10

## 2021-08-25 ASSESSMENT — MIFFLIN-ST. JEOR: SCORE: 1511.5

## 2021-08-25 ASSESSMENT — PAIN SCALES - GENERAL: PAINLEVEL: NO PAIN (0)

## 2021-08-25 NOTE — PROGRESS NOTES
"    Assessment & Plan     Hyperlipidemia LDL goal <130  Been stable,  Refilled his medication.  Patient come back in the next few weeks for a physical exam, blood work.  - lovastatin (MEVACOR) 40 MG tablet; Take 1 tablet (40 mg) by mouth At Bedtime    Seizure disorder (H)  Reviewed his medication.  Has not had any seizure for many years.  - phenytoin (DILANTIN) 100 MG capsule; Take 1 capsule (100 mg) by mouth daily      BMI:   Estimated body mass index is 27.72 kg/m  as calculated from the following:    Height as of this encounter: 1.702 m (5' 7\").    Weight as of this encounter: 80.3 kg (177 lb).   Weight management plan: Discussed healthy diet and exercise guidelines    There are no Patient Instructions on file for this visit.    Return in about 4 weeks (around 9/22/2021) for Routine preventive.    Mehreen Strange MD  [facility] SAMINA Ortega is a 71 year old who presents for the following health issues  accompanied by his brother:  Medication refills    New Patient/Transfer of Care  Medication Followup of pended medications    Taking Medication as prescribed: yes    Side Effects:  None    Medication Helping Symptoms:  yes       Review of Systems   Constitutional, HEENT, cardiovascular, pulmonary, gi and gu systems are negative, except as otherwise noted.      Objective    /81 (BP Location: Right arm, Patient Position: Chair, Cuff Size: Adult Regular)   Pulse 99   Temp 97.8  F (36.6  C) (Oral)   Resp 26   Ht 1.702 m (5' 7\")   Wt 80.3 kg (177 lb)   SpO2 97%   BMI 27.72 kg/m    Body mass index is 27.72 kg/m .  Physical Exam   GENERAL: healthy, alert and no distress  RESP: lungs clear to auscultation - no rales, rhonchi or wheezes  CV: regular rate and rhythm, normal S1 S2, no S3 or S4, no murmur, click or rub, no peripheral edema and peripheral pulses strong  MS: no gross musculoskeletal defects noted, no edema  PSYCH: mentation appears normal, affect " normal/bright    Mehreen Strange MD

## 2021-11-10 ENCOUNTER — OFFICE VISIT (OUTPATIENT)
Dept: FAMILY MEDICINE | Facility: CLINIC | Age: 71
End: 2021-11-10
Payer: MEDICARE

## 2021-11-10 VITALS
BODY MASS INDEX: 27.65 KG/M2 | SYSTOLIC BLOOD PRESSURE: 120 MMHG | TEMPERATURE: 97.6 F | HEART RATE: 110 BPM | HEIGHT: 67 IN | WEIGHT: 176.2 LBS | DIASTOLIC BLOOD PRESSURE: 76 MMHG | RESPIRATION RATE: 16 BRPM | OXYGEN SATURATION: 94 %

## 2021-11-10 DIAGNOSIS — Z12.11 SCREEN FOR COLON CANCER: ICD-10-CM

## 2021-11-10 DIAGNOSIS — Z11.59 NEED FOR HEPATITIS C SCREENING TEST: ICD-10-CM

## 2021-11-10 DIAGNOSIS — E78.5 HYPERLIPIDEMIA LDL GOAL <130: ICD-10-CM

## 2021-11-10 DIAGNOSIS — Z12.5 SCREENING FOR PROSTATE CANCER: ICD-10-CM

## 2021-11-10 DIAGNOSIS — G40.909 SEIZURE DISORDER (H): ICD-10-CM

## 2021-11-10 DIAGNOSIS — Z00.00 ENCOUNTER FOR MEDICARE ANNUAL WELLNESS EXAM: Primary | ICD-10-CM

## 2021-11-10 DIAGNOSIS — Z28.21 IMMUNIZATION DECLINED: ICD-10-CM

## 2021-11-10 LAB
HCV AB SERPL QL IA: NONREACTIVE
PHENYTOIN SERPL-MCNC: 4 MG/L

## 2021-11-10 PROCEDURE — 80185 ASSAY OF PHENYTOIN TOTAL: CPT | Performed by: FAMILY MEDICINE

## 2021-11-10 PROCEDURE — G0103 PSA SCREENING: HCPCS | Performed by: FAMILY MEDICINE

## 2021-11-10 PROCEDURE — 80061 LIPID PANEL: CPT | Performed by: FAMILY MEDICINE

## 2021-11-10 PROCEDURE — G0438 PPPS, INITIAL VISIT: HCPCS | Performed by: FAMILY MEDICINE

## 2021-11-10 PROCEDURE — 80053 COMPREHEN METABOLIC PANEL: CPT | Performed by: FAMILY MEDICINE

## 2021-11-10 PROCEDURE — 36415 COLL VENOUS BLD VENIPUNCTURE: CPT | Performed by: FAMILY MEDICINE

## 2021-11-10 PROCEDURE — 86803 HEPATITIS C AB TEST: CPT | Performed by: FAMILY MEDICINE

## 2021-11-10 RX ORDER — LOVASTATIN 40 MG
40 TABLET ORAL AT BEDTIME
Qty: 90 TABLET | Refills: 3 | Status: SHIPPED | OUTPATIENT
Start: 2021-11-10 | End: 2022-11-08

## 2021-11-10 RX ORDER — PHENYTOIN SODIUM 100 MG/1
100 CAPSULE, EXTENDED RELEASE ORAL DAILY
Qty: 90 CAPSULE | Refills: 3 | Status: SHIPPED | OUTPATIENT
Start: 2021-11-10 | End: 2022-11-08

## 2021-11-10 SDOH — ECONOMIC STABILITY: TRANSPORTATION INSECURITY
IN THE PAST 12 MONTHS, HAS LACK OF TRANSPORTATION KEPT YOU FROM MEETINGS, WORK, OR FROM GETTING THINGS NEEDED FOR DAILY LIVING?: NO

## 2021-11-10 SDOH — ECONOMIC STABILITY: FOOD INSECURITY: WITHIN THE PAST 12 MONTHS, YOU WORRIED THAT YOUR FOOD WOULD RUN OUT BEFORE YOU GOT MONEY TO BUY MORE.: NEVER TRUE

## 2021-11-10 SDOH — HEALTH STABILITY: PHYSICAL HEALTH: ON AVERAGE, HOW MANY DAYS PER WEEK DO YOU ENGAGE IN MODERATE TO STRENUOUS EXERCISE (LIKE A BRISK WALK)?: 1 DAY

## 2021-11-10 SDOH — ECONOMIC STABILITY: INCOME INSECURITY: IN THE LAST 12 MONTHS, WAS THERE A TIME WHEN YOU WERE NOT ABLE TO PAY THE MORTGAGE OR RENT ON TIME?: NO

## 2021-11-10 SDOH — ECONOMIC STABILITY: INCOME INSECURITY: HOW HARD IS IT FOR YOU TO PAY FOR THE VERY BASICS LIKE FOOD, HOUSING, MEDICAL CARE, AND HEATING?: NOT HARD AT ALL

## 2021-11-10 SDOH — ECONOMIC STABILITY: TRANSPORTATION INSECURITY
IN THE PAST 12 MONTHS, HAS THE LACK OF TRANSPORTATION KEPT YOU FROM MEDICAL APPOINTMENTS OR FROM GETTING MEDICATIONS?: NO

## 2021-11-10 SDOH — HEALTH STABILITY: PHYSICAL HEALTH: ON AVERAGE, HOW MANY MINUTES DO YOU ENGAGE IN EXERCISE AT THIS LEVEL?: 10 MIN

## 2021-11-10 SDOH — ECONOMIC STABILITY: FOOD INSECURITY: WITHIN THE PAST 12 MONTHS, THE FOOD YOU BOUGHT JUST DIDN'T LAST AND YOU DIDN'T HAVE MONEY TO GET MORE.: NEVER TRUE

## 2021-11-10 ASSESSMENT — ENCOUNTER SYMPTOMS
DIZZINESS: 0
EYE PAIN: 0
NERVOUS/ANXIOUS: 0
CONSTIPATION: 0
SORE THROAT: 0
MYALGIAS: 0
ARTHRALGIAS: 0
HEMATOCHEZIA: 0
ABDOMINAL PAIN: 0
COUGH: 0
JOINT SWELLING: 0
WEAKNESS: 0
HEMATURIA: 0
FEVER: 0
FREQUENCY: 0
PARESTHESIAS: 0
NAUSEA: 0
CHILLS: 0
HEADACHES: 0
HEARTBURN: 0
DIARRHEA: 0
DYSURIA: 0
SHORTNESS OF BREATH: 0
PALPITATIONS: 0

## 2021-11-10 ASSESSMENT — SOCIAL DETERMINANTS OF HEALTH (SDOH)
HOW OFTEN DO YOU GET TOGETHER WITH FRIENDS OR RELATIVES?: ONCE A WEEK
DO YOU BELONG TO ANY CLUBS OR ORGANIZATIONS SUCH AS CHURCH GROUPS UNIONS, FRATERNAL OR ATHLETIC GROUPS, OR SCHOOL GROUPS?: PATIENT DECLINED
HOW OFTEN DO YOU ATTEND CHURCH OR RELIGIOUS SERVICES?: PATIENT DECLINED
ARE YOU MARRIED, WIDOWED, DIVORCED, SEPARATED, NEVER MARRIED, OR LIVING WITH A PARTNER?: NEVER MARRIED
IN A TYPICAL WEEK, HOW MANY TIMES DO YOU TALK ON THE PHONE WITH FAMILY, FRIENDS, OR NEIGHBORS?: PATIENT DECLINED

## 2021-11-10 ASSESSMENT — LIFESTYLE VARIABLES
HOW OFTEN DO YOU HAVE A DRINK CONTAINING ALCOHOL: NEVER
HOW MANY STANDARD DRINKS CONTAINING ALCOHOL DO YOU HAVE ON A TYPICAL DAY: PATIENT DECLINED
HOW OFTEN DO YOU HAVE SIX OR MORE DRINKS ON ONE OCCASION: NEVER

## 2021-11-10 ASSESSMENT — ACTIVITIES OF DAILY LIVING (ADL): CURRENT_FUNCTION: NO ASSISTANCE NEEDED

## 2021-11-10 ASSESSMENT — PAIN SCALES - GENERAL: PAINLEVEL: NO PAIN (0)

## 2021-11-10 ASSESSMENT — MIFFLIN-ST. JEOR: SCORE: 1507.87

## 2021-11-10 NOTE — PATIENT INSTRUCTIONS
Patient Education   Personalized Prevention Plan  You are due for the preventive services outlined below.  Your care team is available to assist you in scheduling these services.  If you have already completed any of these items, please share that information with your care team to update in your medical record.  Health Maintenance Due   Topic Date Due     Hepatitis C Screening  Never done     Diptheria Tetanus Pertussis (DTAP/TDAP/TD) Vaccine (1 - Tdap) Never done     Zoster (Shingles) Vaccine (1 of 2) Never done     Annual Wellness Visit  Never done     Pneumococcal Vaccine (1 of 1 - PPSV23) Never done     AORTIC ANEURYSM SCREENING (SYSTEM ASSIGNED)  Never done     Discuss Advance Care Planning  09/21/2016     FALL RISK ASSESSMENT  03/05/2019     Colorectal Cancer Screening  08/18/2021     Flu Vaccine (1) Never done     Preventive Health Recommendations  See your health care provider every year to    Review health changes.     Discuss preventive care.      Review your medicines if your doctor has prescribed any.    Talk with your health care provider about whether you should have a test to screen for prostate cancer (PSA).    Every 3 years, have a diabetes test (fasting glucose). If you are at risk for diabetes, you should have this test more often.    Every 5 years, have a cholesterol test. Have this test more often if you are at risk for high cholesterol or heart disease.     Every 10 years, have a colonoscopy. Or, have a yearly FIT test (stool test). These exams will check for colon cancer.    Talk to with your health care provider about screening for Abdominal Aortic Aneurysm if you have a family history of AAA or have a history of smoking.    Shots:     Get a flu shot each year.     Get a tetanus shot every 10 years.     Talk to your doctor about your pneumonia vaccines. There are now two you should receive - Pneumovax (PPSV 23) and Prevnar (PCV 13).    Talk to your pharmacist about a shingles vaccine.      Talk to your doctor about the hepatitis B vaccine.    Nutrition:     Eat at least 5 servings of fruits and vegetables each day.     Eat whole-grain bread, whole-wheat pasta and brown rice instead of white grains and rice.     Get adequate Calcium and Vitamin D.     Lifestyle    Exercise for at least 150 minutes a week (30 minutes a day, 5 days a week). This will help you control your weight and prevent disease.     Limit alcohol to one drink per day.     No smoking.     Wear sunscreen to prevent skin cancer.     See your dentist every six months for an exam and cleaning.     See your eye doctor every 1 to 2 years to screen for conditions such as glaucoma, macular degeneration and cataracts.    Personalized Prevention Plan  You are due for the preventive services outlined below.  Your care team is available to assist you in scheduling these services.  If you have already completed any of these items, please share that information with your care team to update in your medical record.  Health Maintenance   Topic Date Due     HEPATITIS C SCREENING  Never done     DTAP/TDAP/TD IMMUNIZATION (1 - Tdap) Never done     ZOSTER IMMUNIZATION (1 of 2) Never done     MEDICARE ANNUAL WELLNESS VISIT  Never done     Pneumococcal Vaccine: 65+ Years (1 of 1 - PPSV23) Never done     AORTIC ANEURYSM SCREENING (SYSTEM ASSIGNED)  Never done     ADVANCE CARE PLANNING  09/21/2016     FALL RISK ASSESSMENT  03/05/2019     COLORECTAL CANCER SCREENING  08/18/2021     INFLUENZA VACCINE (1) Never done     COVID-19 Vaccine (3 - Booster for Moderna series) 11/23/2021     ANNUAL REVIEW OF HM ORDERS  08/25/2022     LIPID  02/28/2025     PHQ-2  Completed     IPV IMMUNIZATION  Aged Out     MENINGITIS IMMUNIZATION  Aged Out     HEPATITIS B IMMUNIZATION  Aged Out

## 2021-11-10 NOTE — PROGRESS NOTES
"SUBJECTIVE:   Jordan Kennedy is a 71 year old male who presents for Preventive Visit.  Comes for an annual physical exam, history of seizure, he is on phenytoin 100 mg daily he reports has not had a seizure for at least 20 years.    He declined colon cancer screening.,  Declined all immunizations today, \" I hayden on that \"    Patient has been advised of split billing requirements and indicates understanding: Yes   Are you in the first 12 months of your Medicare coverage?  No    Healthy Habits:     In general, how would you rate your overall health?  Good    Frequency of exercise:  1 day/week    Duration of exercise:  30-45 minutes    Do you usually eat at least 4 servings of fruit and vegetables a day, include whole grains    & fiber and avoid regularly eating high fat or \"junk\" foods?  No    Taking medications regularly:  Yes    Medication side effects:  None    Ability to successfully perform activities of daily living:  No assistance needed    Home Safety:  No safety concerns identified    Hearing Impairment:  No hearing concerns    In the past 6 months, have you been bothered by leaking of urine?  No    In general, how would you rate your overall mental or emotional health?  Good      PHQ-2 Total Score: 0    Additional concerns today:  No    Do you feel safe in your environment? Yes    Have you ever done Advance Care Planning? (For example, a Health Directive, POLST, or a discussion with a medical provider or your loved ones about your wishes): No, advance care planning information given to patient to review.  Patient plans to discuss their wishes with loved ones or provider.         Fall risk  Fallen 2 or more times in the past year?: No  Any fall with injury in the past year?: No    Cognitive Screening   1) Repeat 3 items (Leader, Season, Table)    2) Clock draw: ABNORMAL misplaced hands of clock  3) 3 item recall: Recalls 1 object   Results: ABNORMAL clock, 1-2 items recalled: PROBABLE COGNITIVE " IMPAIRMENT, **INFORM PROVIDER**    Mini-CogTM Copyright MARGARITA De Dios. Licensed by the author for use in [facility]; reprinted with permission (any@[facility]). All rights reserved.      Do you have sleep apnea, excessive snoring or daytime drowsiness?: no    Reviewed and updated as needed this visit by clinical staff  Tobacco  Allergies  Meds   Med Hx  Surg Hx  Fam Hx  Soc Hx       Reviewed and updated as needed this visit by Provider               Social History     Tobacco Use     Smoking status: Never Smoker     Smokeless tobacco: Never Used   Substance Use Topics     Alcohol use: Yes     Comment: Once in a while have a beer     If you drink alcohol do you typically have >3 drinks per day or >7 drinks per week? No    Alcohol Use 11/10/2021   Prescreen: >3 drinks/day or >7 drinks/week? No   Prescreen: >3 drinks/day or >7 drinks/week? -   No flowsheet data found.    Current providers sharing in care for this patient include:   Patient Care Team:  Kasi Edwards MD as PCP - General (Family Practice)  Mehreen Strange MD as Assigned PCP    The following health maintenance items are reviewed in Epic and correct as of today:  Health Maintenance Due   Topic Date Due     HEPATITIS C SCREENING  Never done     DTAP/TDAP/TD IMMUNIZATION (1 - Tdap) Never done     ZOSTER IMMUNIZATION (1 of 2) Never done     Pneumococcal Vaccine: 65+ Years (1 of 1 - PPSV23) Never done     AORTIC ANEURYSM SCREENING (SYSTEM ASSIGNED)  Never done     ADVANCE CARE PLANNING  09/21/2016     FALL RISK ASSESSMENT  03/05/2019     COLORECTAL CANCER SCREENING  08/18/2021     INFLUENZA VACCINE (1) Never done       Review of Systems   Constitutional: Negative for chills and fever.   HENT: Positive for hearing loss. Negative for congestion, ear pain and sore throat.    Eyes: Negative for pain and visual disturbance.   Respiratory: Negative for cough and shortness of breath.    Cardiovascular: Negative for chest pain, palpitations and  "peripheral edema.   Gastrointestinal: Negative for abdominal pain, constipation, diarrhea, heartburn, hematochezia and nausea.   Genitourinary: Negative for dysuria, frequency, genital sores, hematuria, impotence, penile discharge and urgency.   Musculoskeletal: Negative for arthralgias, joint swelling and myalgias.   Skin: Negative for rash.   Neurological: Negative for dizziness, weakness, headaches and paresthesias.   Psychiatric/Behavioral: Negative for mood changes. The patient is not nervous/anxious.      Constitutional, HEENT, cardiovascular, pulmonary, GI, , musculoskeletal, neuro, skin, endocrine and psych systems are negative, except as otherwise noted.    OBJECTIVE:   /76 (BP Location: Right arm, Patient Position: Sitting, Cuff Size: Adult Regular)   Pulse 110   Temp 97.6  F (36.4  C) (Tympanic)   Resp 16   Ht 1.702 m (5' 7\")   Wt 79.9 kg (176 lb 3.2 oz)   SpO2 94%   BMI 27.60 kg/m   Estimated body mass index is 27.6 kg/m  as calculated from the following:    Height as of this encounter: 1.702 m (5' 7\").    Weight as of this encounter: 79.9 kg (176 lb 3.2 oz).  Physical Exam  GENERAL: healthy, alert and no distress  HENT: ear canals and TM's normal, nose and mouth without ulcers or lesions  NECK: no adenopathy, no asymmetry, masses, or scars and thyroid normal to palpation  RESP: lungs clear to auscultation - no rales, rhonchi or wheezes  CV: regular rate and rhythm, normal S1 S2, no S3 or S4, no murmur, click or rub, no peripheral edema and peripheral pulses strong  ABDOMEN: soft, nontender, no hepatosplenomegaly, no masses and bowel sounds normal  MS: no gross musculoskeletal defects noted, no edema  SKIN: no suspicious lesions or rashes  NEURO: Normal strength and tone, mentation intact and speech normal  PSYCH: mentation appears normal, affect normal/bright    Diagnostic Test Results:  Labs reviewed in Epic  Orders Placed This Encounter   Procedures     Hepatitis C Screen Reflex to HCV " "RNA Quant and Genotype     PSA, screen     Comprehensive metabolic panel (BMP + Alb, Alk Phos, ALT, AST, Total. Bili, TP)     Lipid panel reflex to direct LDL Fasting     Phenytoin level       ASSESSMENT / PLAN:   (Z00.00) Encounter for Medicare annual wellness exam  (primary encounter diagnosis)  Comment: normal exam today  Plan: Comprehensive metabolic panel (BMP + Alb, Alk         Phos, ALT, AST, Total. Bili, TP), Lipid panel         reflex to direct LDL Fasting, Phenytoin level        Routine preventive care discussed.  1 year follow-up recommended for an annual exam  Declined colon cancer screening.  Declined all immunizations today.  Prostate cancer screening today.  Up to date on COVID vaccine    (Z12.11) Screen for colon cancer  Comment:   Plan: Declined    (Z12.5) Screening for prostate cancer  Comment:   Plan: PSA, screen        screening    (G40.909) Seizure disorder (H)  Comment: stable  Plan: phenytoin (DILANTIN) 100 MG capsule        Continue with current medictions    (E78.5) Hyperlipidemia LDL goal <130  Comment:   Plan: lovastatin (MEVACOR) 40 MG tablet            (Z28.21) Immunization declined  Comment:   Plan: Declined    (Z11.59) Need for hepatitis C screening test  Comment:   Plan: Hepatitis C Screen Reflex to HCV RNA Quant and         Genotype            Patient has been advised of split billing requirements and indicates understanding: Yes  COUNSELING:  Reviewed preventive health counseling, as reflected in patient instructions       Regular exercise       Healthy diet/nutrition       Vision screening       Dental care       Fall risk prevention       Immunizations    Declined: Influenza, Pneumococcal and Zoster due to Other would like to hayden.               Syphilis screening for high risk patients        Prostate cancer screening    Estimated body mass index is 27.6 kg/m  as calculated from the following:    Height as of this encounter: 1.702 m (5' 7\").    Weight as of this encounter: 79.9 " kg (176 lb 3.2 oz).        He reports that he has never smoked. He has never used smokeless tobacco.      Appropriate preventive services were discussed with this patient, including applicable screening as appropriate for cardiovascular disease, diabetes, osteopenia/osteoporosis, and glaucoma.  As appropriate for age/gender, discussed screening for colorectal cancer, prostate cancer, breast cancer, and cervical cancer. Checklist reviewing preventive services available has been given to the patient.    Reviewed patients plan of care and provided an AVS. The Basic Care Plan (routine screening as documented in Health Maintenance) for Jordan meets the Care Plan requirement. This Care Plan has been established and reviewed with the Patient.    Counseling Resources:  ATP IV Guidelines  Pooled Cohorts Equation Calculator  Breast Cancer Risk Calculator  Breast Cancer: Medication to Reduce Risk  FRAX Risk Assessment  ICSI Preventive Guidelines  Dietary Guidelines for Americans, 2010  USDA's MyPlate  ASA Prophylaxis  Lung CA Screening    Mehreen Strange MD  [facility]    Identified Health Risks:

## 2021-11-11 ENCOUNTER — TELEPHONE (OUTPATIENT)
Dept: FAMILY MEDICINE | Facility: CLINIC | Age: 71
End: 2021-11-11
Payer: MEDICARE

## 2021-11-11 DIAGNOSIS — R97.20 ELEVATED PROSTATE SPECIFIC ANTIGEN (PSA): Primary | ICD-10-CM

## 2021-11-11 LAB
ALBUMIN SERPL-MCNC: 4.2 G/DL (ref 3.4–5)
ALP SERPL-CCNC: 168 U/L (ref 40–150)
ALT SERPL W P-5'-P-CCNC: 26 U/L (ref 0–70)
ANION GAP SERPL CALCULATED.3IONS-SCNC: 8 MMOL/L (ref 3–14)
AST SERPL W P-5'-P-CCNC: 12 U/L (ref 0–45)
BILIRUB SERPL-MCNC: 0.5 MG/DL (ref 0.2–1.3)
BUN SERPL-MCNC: 19 MG/DL (ref 7–30)
CALCIUM SERPL-MCNC: 9.2 MG/DL (ref 8.5–10.1)
CHLORIDE BLD-SCNC: 108 MMOL/L (ref 94–109)
CHOLEST SERPL-MCNC: 141 MG/DL
CO2 SERPL-SCNC: 22 MMOL/L (ref 20–32)
CREAT SERPL-MCNC: 0.88 MG/DL (ref 0.66–1.25)
FASTING STATUS PATIENT QL REPORTED: YES
GFR SERPL CREATININE-BSD FRML MDRD: 86 ML/MIN/1.73M2
GLUCOSE BLD-MCNC: 116 MG/DL (ref 70–99)
HDLC SERPL-MCNC: 39 MG/DL
LDLC SERPL CALC-MCNC: 77 MG/DL
NONHDLC SERPL-MCNC: 102 MG/DL
POTASSIUM BLD-SCNC: 4.5 MMOL/L (ref 3.4–5.3)
PROT SERPL-MCNC: 7.6 G/DL (ref 6.8–8.8)
PSA SERPL-MCNC: 22.4 UG/L (ref 0–4)
SODIUM SERPL-SCNC: 138 MMOL/L (ref 133–144)
TRIGL SERPL-MCNC: 126 MG/DL

## 2021-11-11 NOTE — TELEPHONE ENCOUNTER
Attempt #1 to call patient.     RN left voicemail at home number and requested return call to [facility] at [phone].     Jade Gil RN, BSN, PHN  [facility]: [place]        ----- Message from Mehreen Strange MD sent at 11/11/2021  9:58 AM CST -----  Please call pt with result  Elevated PSA, at 22.4  Need to see urology, I put a referral  Please help pt to schedule an appt.    Normal for other labs  Continue with current medications    thanks

## 2021-11-12 NOTE — TELEPHONE ENCOUNTER
Called and spoke with pt. He asked that I call his brother Vika at [phone].    Called Vika and reviewed results and recommendations per pt request and gave telephone number to Urology to schedule visit at [phone].    Helen Barnett RN

## 2021-11-15 ENCOUNTER — TELEPHONE (OUTPATIENT)
Dept: FAMILY MEDICINE | Facility: CLINIC | Age: 71
End: 2021-11-15
Payer: MEDICARE

## 2021-11-19 NOTE — TELEPHONE ENCOUNTER
Left message for patient to call back to make an appointment. Pt has an appointment with Dr. Smiley in December. Will close encounter and if patient calls back, we can schedule with Dr. Pate.    Gianna AVILA RN Urology 11/19/2021 11:26 AM

## 2021-12-06 ENCOUNTER — VIRTUAL VISIT (OUTPATIENT)
Dept: UROLOGY | Facility: CLINIC | Age: 71
End: 2021-12-06
Payer: MEDICARE

## 2021-12-06 DIAGNOSIS — R97.20 ELEVATED PROSTATE SPECIFIC ANTIGEN (PSA): ICD-10-CM

## 2021-12-06 DIAGNOSIS — N30.00 ACUTE CYSTITIS WITHOUT HEMATURIA: ICD-10-CM

## 2021-12-06 PROCEDURE — 99441 PR PHYSICIAN TELEPHONE EVALUATION 5-10 MIN: CPT | Performed by: UROLOGY

## 2021-12-06 NOTE — PROGRESS NOTES
Telephone visit    71-year-old male with an elevated PSA (22.4 ng/mL) most recent previous PSA 3.26 ng/mL 8 years ago.    Father had prostate cancer.    No voiding symptoms.    No recent urinalysis or urine culture.    Impression: Elevated PSA    Plan: Urinalysis and urine culture have been ordered. If there is no evidence of UTI a prostate MRI will be obtained.    Total time 10 minutes

## 2021-12-09 ENCOUNTER — LAB (OUTPATIENT)
Dept: LAB | Facility: CLINIC | Age: 71
End: 2021-12-09
Payer: MEDICARE

## 2021-12-09 DIAGNOSIS — N30.00 ACUTE CYSTITIS WITHOUT HEMATURIA: ICD-10-CM

## 2021-12-09 DIAGNOSIS — R97.20 ELEVATED PROSTATE SPECIFIC ANTIGEN (PSA): ICD-10-CM

## 2021-12-09 LAB
ALBUMIN UR-MCNC: NEGATIVE MG/DL
APPEARANCE UR: CLEAR
BACTERIA #/AREA URNS HPF: ABNORMAL /HPF
BILIRUB UR QL STRIP: NEGATIVE
COLOR UR AUTO: YELLOW
GLUCOSE UR STRIP-MCNC: NEGATIVE MG/DL
HGB UR QL STRIP: NEGATIVE
KETONES UR STRIP-MCNC: ABNORMAL MG/DL
LEUKOCYTE ESTERASE UR QL STRIP: NEGATIVE
MUCOUS THREADS #/AREA URNS LPF: PRESENT /LPF
NITRATE UR QL: NEGATIVE
PH UR STRIP: 6 [PH] (ref 5–7)
RBC #/AREA URNS AUTO: ABNORMAL /HPF
SP GR UR STRIP: >=1.03 (ref 1–1.03)
SQUAMOUS #/AREA URNS AUTO: ABNORMAL /LPF
UROBILINOGEN UR STRIP-ACNC: 0.2 E.U./DL
WBC #/AREA URNS AUTO: ABNORMAL /HPF

## 2021-12-09 PROCEDURE — 81001 URINALYSIS AUTO W/SCOPE: CPT

## 2021-12-09 PROCEDURE — 87086 URINE CULTURE/COLONY COUNT: CPT

## 2021-12-10 LAB — BACTERIA UR CULT: NO GROWTH

## 2022-01-08 ENCOUNTER — ANCILLARY PROCEDURE (OUTPATIENT)
Dept: MRI IMAGING | Facility: CLINIC | Age: 72
End: 2022-01-08
Attending: UROLOGY
Payer: MEDICARE

## 2022-01-08 DIAGNOSIS — R97.20 ELEVATED PROSTATE SPECIFIC ANTIGEN (PSA): ICD-10-CM

## 2022-01-08 PROCEDURE — 72197 MRI PELVIS W/O & W/DYE: CPT | Mod: 26 | Performed by: RADIOLOGY

## 2022-01-08 PROCEDURE — A9585 GADOBUTROL INJECTION: HCPCS | Performed by: RADIOLOGY

## 2022-01-08 RX ORDER — GADOBUTROL 604.72 MG/ML
10 INJECTION INTRAVENOUS ONCE
Status: COMPLETED | OUTPATIENT
Start: 2022-01-08 | End: 2022-01-08

## 2022-01-08 RX ADMIN — GADOBUTROL 8 ML: 604.72 INJECTION INTRAVENOUS at 09:00

## 2022-01-13 ENCOUNTER — TELEPHONE (OUTPATIENT)
Dept: UROLOGY | Facility: CLINIC | Age: 72
End: 2022-01-13
Payer: MEDICARE

## 2022-01-25 ENCOUNTER — TELEPHONE (OUTPATIENT)
Dept: UROLOGY | Facility: CLINIC | Age: 72
End: 2022-01-25
Payer: MEDICARE

## 2022-01-25 ENCOUNTER — PRE VISIT (OUTPATIENT)
Dept: UROLOGY | Facility: CLINIC | Age: 72
End: 2022-01-25
Payer: MEDICARE

## 2022-01-25 DIAGNOSIS — R97.20 ELEVATED PROSTATE SPECIFIC ANTIGEN (PSA): Primary | ICD-10-CM

## 2022-01-25 RX ORDER — CIPROFLOXACIN 500 MG/1
500 TABLET, FILM COATED ORAL 2 TIMES DAILY
Qty: 6 TABLET | Refills: 0 | Status: SHIPPED | OUTPATIENT
Start: 2022-01-25 | End: 2022-01-28

## 2022-01-25 NOTE — TELEPHONE ENCOUNTER
LVM and callback # for patient's brother to schedule Patient an appointment with Dr. Villeda on 2/9.  Appointment can either be VV at 8:45am or 2:15pm in-person.

## 2022-01-25 NOTE — CONFIDENTIAL NOTE
Reason for visit: fusion prostate biopsy     Relevant information: elevated psa    Records/imaging/labs/orders: in epic    Pt called: LVM, will mail prep as well    At Rooming: verify procedure prep, give gent      Called patient to go over prep for biopsy including:      No blood thinning medications for 7 days before procedure, including aspirin, anticoagulants, ibuprofen and fish oil.       prophylactic antibiotics sent to primary pharmacy listed in chart:   -take the day before, the day of and the day after the procedure, one tablet, two times daily.      Complete a Fleets enema approximately 2 hours before the scheduled procedure.      Do not come to the appointment fasted.

## 2022-01-28 ENCOUNTER — TELEPHONE (OUTPATIENT)
Dept: UROLOGY | Facility: CLINIC | Age: 72
End: 2022-01-28
Payer: MEDICARE

## 2022-01-28 NOTE — TELEPHONE ENCOUNTER
Called pt to be sure he heard my last VM going over his prep. No answer - LVM for pt to remind him of his upcoming prostate biopsy and that I sent a copy of the prep to his home.    Mayuri Sun MA  January 28, 2022  9:26 AM   None

## 2022-02-01 ENCOUNTER — PRE VISIT (OUTPATIENT)
Dept: UROLOGY | Facility: CLINIC | Age: 72
End: 2022-02-01
Payer: MEDICARE

## 2022-02-01 NOTE — TELEPHONE ENCOUNTER
Reason for Visit: Bx results, Bx scheduled for 2/2/22    Diagnosis: Elevated PSA    Orders/Procedures/Records: in system    Contact Patient: n/a    Rooming Requirements: normal      Elizabeth Thompson LPN  02/01/22  12:53 PM

## 2022-02-02 ENCOUNTER — OFFICE VISIT (OUTPATIENT)
Dept: UROLOGY | Facility: CLINIC | Age: 72
End: 2022-02-02
Payer: MEDICARE

## 2022-02-02 VITALS
WEIGHT: 178 LBS | HEIGHT: 67 IN | HEART RATE: 116 BPM | DIASTOLIC BLOOD PRESSURE: 79 MMHG | BODY MASS INDEX: 27.94 KG/M2 | SYSTOLIC BLOOD PRESSURE: 126 MMHG

## 2022-02-02 DIAGNOSIS — R97.20 ELEVATED PROSTATE SPECIFIC ANTIGEN (PSA): Primary | ICD-10-CM

## 2022-02-02 PROCEDURE — 88305 TISSUE EXAM BY PATHOLOGIST: CPT | Mod: TC | Performed by: UROLOGY

## 2022-02-02 PROCEDURE — 55700 PR BIOPSY OF PROSTATE,NEEDLE/PUNCH: CPT | Performed by: UROLOGY

## 2022-02-02 PROCEDURE — 76872 US TRANSRECTAL: CPT | Performed by: UROLOGY

## 2022-02-02 PROCEDURE — G0416 PROSTATE BIOPSY, ANY MTHD: HCPCS | Mod: 26 | Performed by: PATHOLOGY

## 2022-02-02 RX ORDER — LIDOCAINE HYDROCHLORIDE 10 MG/ML
10 INJECTION, SOLUTION EPIDURAL; INFILTRATION; INTRACAUDAL; PERINEURAL ONCE
Status: COMPLETED | OUTPATIENT
Start: 2022-02-02 | End: 2022-02-02

## 2022-02-02 RX ORDER — GENTAMICIN 40 MG/ML
80 INJECTION, SOLUTION INTRAMUSCULAR; INTRAVENOUS ONCE
Status: COMPLETED | OUTPATIENT
Start: 2022-02-02 | End: 2022-02-02

## 2022-02-02 RX ADMIN — GENTAMICIN 80 MG: 40 INJECTION, SOLUTION INTRAMUSCULAR; INTRAVENOUS at 12:20

## 2022-02-02 RX ADMIN — LIDOCAINE HYDROCHLORIDE 10 ML: 10 INJECTION, SOLUTION EPIDURAL; INFILTRATION; INTRACAUDAL; PERINEURAL at 12:20

## 2022-02-02 ASSESSMENT — PAIN SCALES - GENERAL: PAINLEVEL: NO PAIN (0)

## 2022-02-02 ASSESSMENT — MIFFLIN-ST. JEOR: SCORE: 1516.03

## 2022-02-02 NOTE — LETTER
"2/2/2022       RE: Jordan Kennedy  [address] [facility] [address]     Dear Colleague,    Thank you for referring your patient, Jordan Kennedy, to the [facility] [place] at [facility]. Please see a copy of my visit note below.    Urology Clinic     HPI  Jordan Kennedy is a 71 year old male with history of elevated PSA, here for follow-up.        He last saw Dr. Smiley on 12/6/2021 for elevated PSA of 22.4. Prostate MRI was obtained which showed a 31 gr prostate with a PIRADs 5 lesion at the right apex mid PZ. He is therefore here for prostate biopsy     No changes to health, hospitalizations or new diagnoses in the interim    PHYSICAL EXAM  /79   Pulse 116   Ht 1.702 m (5' 7\")   Wt 80.7 kg (178 lb)   BMI 27.88 kg/m     Constitutional: AO, pleasant, NAD  Resp: Non-labored breathing on room air  Abd: Soft, NT, ND  GEORGE: 60 gm, non-tender, symmetric, no nodules, no rectal masses palpated, adequate sphincter    Labs  Lab Results   Component Value Date    WBC 4.0 03/24/2011     Lab Results   Component Value Date    RBC 5.23 03/24/2011     Lab Results   Component Value Date    HGB 16.4 03/24/2011     Lab Results   Component Value Date    HCT 49.9 03/24/2011     No components found for: MCT  Lab Results   Component Value Date    MCV 95 03/24/2011     Lab Results   Component Value Date    MCH 31.4 03/24/2011     Lab Results   Component Value Date    MCHC 32.9 03/24/2011     Lab Results   Component Value Date    RDW 12.4 03/24/2011     Lab Results   Component Value Date     03/24/2011        Last Comprehensive Metabolic Panel:  Sodium   Date Value Ref Range Status   11/10/2021 138 133 - 144 mmol/L Final   03/13/2019 141 133 - 144 mmol/L Final     Potassium   Date Value Ref Range Status   11/10/2021 4.5 3.4 - 5.3 mmol/L Final   03/13/2019 4.3 3.4 - 5.3 mmol/L Final     Chloride   Date Value Ref Range Status "   11/10/2021 108 94 - 109 mmol/L Final   03/13/2019 106 94 - 109 mmol/L Final     Carbon Dioxide   Date Value Ref Range Status   03/13/2019 26 20 - 32 mmol/L Final     Carbon Dioxide (CO2)   Date Value Ref Range Status   11/10/2021 22 20 - 32 mmol/L Final     Anion Gap   Date Value Ref Range Status   11/10/2021 8 3 - 14 mmol/L Final   03/13/2019 9 3 - 14 mmol/L Final     Glucose   Date Value Ref Range Status   11/10/2021 116 (H) 70 - 99 mg/dL Final   03/13/2019 103 (H) 70 - 99 mg/dL Final     Comment:     Fasting specimen     Urea Nitrogen   Date Value Ref Range Status   11/10/2021 19 7 - 30 mg/dL Final   03/13/2019 12 7 - 30 mg/dL Final     Creatinine   Date Value Ref Range Status   11/10/2021 0.88 0.66 - 1.25 mg/dL Final   03/13/2019 0.87 0.66 - 1.25 mg/dL Final     GFR Estimate   Date Value Ref Range Status   11/10/2021 86 >60 mL/min/1.73m2 Final     Comment:     As of July 11, 2021, eGFR is calculated by the CKD-EPI creatinine equation, without race adjustment. eGFR can be influenced by muscle mass, exercise, and diet. The reported eGFR is an estimation only and is only applicable if the renal function is stable.   03/13/2019 88 >60 mL/min/[1.73_m2] Final     Comment:     Non  GFR Calc  Starting 12/18/2018, serum creatinine based estimated GFR (eGFR) will be   calculated using the Chronic Kidney Disease Epidemiology Collaboration   (CKD-EPI) equation.       Calcium   Date Value Ref Range Status   11/10/2021 9.2 8.5 - 10.1 mg/dL Final   03/13/2019 9.5 8.5 - 10.1 mg/dL Final     Bilirubin Total   Date Value Ref Range Status   11/10/2021 0.5 0.2 - 1.3 mg/dL Final   03/24/2011 0.3 0.2 - 1.3 mg/dL Final     Alkaline Phosphatase   Date Value Ref Range Status   11/10/2021 168 (H) 40 - 150 U/L Final   03/24/2011 169 (H) 40 - 150 U/L Final     ALT   Date Value Ref Range Status   11/10/2021 26 0 - 70 U/L Final   03/13/2019 36 0 - 70 U/L Final     AST   Date Value Ref Range Status   11/10/2021 12 0 - 45 U/L  Final   03/13/2019 21 0 - 45 U/L Final       PSA   Date Value Ref Range Status   04/15/2013 3.26 0 - 4 ug/L Final   03/07/2008 1.920 ng/mL    03/07/2008 1.920 ng/mL      Prostate Specific Antigen Screen   Date Value Ref Range Status   11/10/2021 22.40 (H) 0.00 - 4.00 ug/L Final          Imaging   MRI PROSTATE: 1/8/2022 10:22 AM     CLINICAL HISTORY: Elevated prostate specific antigen (PSA)     Most Recent PSA: 22.40 ug/L     Comparison: No similar study.     TECHNIQUE:  The following sequences were obtained: High-resolution axial  T2-weighted, coronal T2-weighted, 3D volumetric T2-weighted, axial  pre-contrast T1, axial diffusion-weighted, axial apparent diffusion  coefficient and axial dynamic contrast-enhanced T1. Postcontrast  images were evaluated on a separate workstation to evaluate dynamic  contrast enhancement. The technique of this exam is PI-RADS v2.1  compliant. Contrast dose: 8ml Gadavist     FINDINGS:  Size: 31 grams  Hemorrhage: Absent  Peripheral zone: Heterogeneous on T2-weighted images. Suspicious  lesions as detailed below.  Transition zone: Enlarged with BPH changes. Suspicious lesions as  detailed below.     Lesion(s) in rank order of severity (highest score- to lowest score,  then by size)      Lesion 1:  Location: Right greater than left apex, mid gland, and base peripheral  and transition zone at the 8-2 o'clock position relative to the  urethra. Series 7 image 51.   Additional prostate regions involved: There is abutment of the  external urethral sphincter. There is involvement of the anterior  fibromuscular stroma.  Size: 34 mm  T2 description: T2 lenticular shaped hypointense signal  T2 numerical assessment: 5  DWI description: Restriction on DWI low signal on ADC ranging from  700-750  DWI numerical assessment: 5  DCE assessment: Positive    Prostate margin: Capsular abutment>15 mm with capsular irregularity  and focal bulging   Lesion overall PI-RADS category: 5     Neurovascular bundles:  No neurovascular bundle involvement by  malignancy.  Seminal vesicles: No seminal vesicle involvement by malignancy.  Lymph nodes: Indeterminate pelvic lymph nodes for example in the right  external iliac chain, series 7 image 7.  Bones: Heterogeneous bone marrow signal intensity with no convincing  discrete bone lesions.  Other pelvic organs: Colonic diverticulosis. Rectal tube. Urinary  bladder wall thickening with trabeculation likely due to chronic  outlet obstructive changes. Prominent fat in the left greater than  right inguinal canal.                                                                      IMPRESSION:  1. Based on the most suspicious abnormality, this exam is  characterized as PIRADS 5 - Clinically significant cancer is highly  likely to be present.  The most suspicious abnormality is located at  the right greater than left apex, mid gland and base peripheral and  transitional zone of the prostate, 8-2:00 position and there is high  suspicion of extraprostatic extension.  2. Indeterminate pelvic lymph nodes. Heterogeneous bone marrow  attenuation with no convincing discrete bone lesions. Consider further  evaluation with nuclear medicine bone scan for better  characterization.  3. Additional incidental findings as described above.       PREPROCEDURE DIAGNOSIS: Elevated PSA   POSTPROCEDURE DIAGNOSIS: Elevated PSA.   PROCEDURE: Transrectal ultrasound sizing and transrectal ultrasound guided prostatic needle biopsy, including MRI fusion targeting    SURGEON: Ami Villeda MD   ANESTHESIA: 5 mL of 1% periprostatic block bilaterally   We previously obtained an MRI of the prostate and identified all PIRADS 3-5 lesions for targeting    Target Lesion #1 Right apex/mid lesion       DESCRIPTION OF PROCEDURE: The procedure, the outcome, the anesthesia, and the risks were discussed with the patient. Informed consent was obtained and signed and a timeout was completed prior to the procedure. Digital rectal  examination was performed with the below findings noted. Anesthesia was administered as noted above and the transrectal ultrasound probe was inserted, sizing was performed, and the below findings were noted. 2 core biopsies were taken from each of the MRI targets, and 12 core biopsies were taken as described below. The probe was then withdrawn. Patient tolerated the procedure well.   FINDINGS: Digital rectal exam reveals normal prostate. Total volume is 30 mL. US images were obtained and then fused with the MRI images. The fused images were then used to guide the biopsy of the targeted lesions with 2 cores taken of each lesion.  We then performed random biopsies.  12 cores were taken with 6 on each side, base, mid and apex.    15 total minutes spent on the prostate biopsy itself.   ASSESSMENT AND PLAN  71 year old male with elevated PSA and abnormal MRI     Plan   Call with pathology results     40 total minutes spent on the date of the encounter including direct interaction with the patient, performing chart review, documentation and further activities as noted above, exclusive of the time spent on performing prostate biopsy    Ami Villeda MD   Department of Urology   [facility]

## 2022-02-02 NOTE — PATIENT INSTRUCTIONS
[facility]  Precautions Following a Prostate Biopsy    There are four conditions that you should watch for after a prostate biopsy:    1. Excessive pain  2. Bleeding irregularities (passing numerous  dime sized  clots or if your urine looks like cranberry juice)  3. Fever of 100 degrees or more  4. If you are unable to urinate        If any of these occur, call the Urology Clinic during normal business hours (M-F, 8:00-4:30) at [phone].  If you experience a problem after normal business hours, call our 24-hour phone number at [phone] and ask for the Urology Resident on call to be paged.      If you experience any discomfort following the biopsy, you may take Tylenol.  DO NOT TAKE ASPIRIN unless specified by your physician.   If the discomfort becomes severe or uncontrolled by medication, contact the Urology Clinic or Urology Resident (after normal business hours).      Do not be alarmed if you have some blood in your stool, in your urine, or ejaculate (semen).  This occurrence is normal and may last up to three (3) or four (4) days, usually intermittently.  Blood in the ejaculate (semen) may last several weeks, up to about a dozen ejaculations.  The blood in your ejaculate may appear as brown streaks, blood tinged, and immediately following a biopsy, it may appear bright red.      If you run a fever above 100 degrees, call the Urology Clinic or Urology Resident (after normal business hours) immediately.  If you are unable to reach your physician or the Resident on call, go to the nearest emergency room.  Explain that you have had a transrectal biopsy of your prostate and what problems you are experiencing.        You should attempt to urinate following your biopsy before you leave the clinic.  If you are unable to urinate four (4) to six (6) hours after you leave the clinic, you will need to contact the Urology Clinic or the Resident on call.  If you are unable to reach  your physician or the Resident on call, go to the nearest emergency room.            If you have any questions or concerns after your biopsy, feel free to contact the Urology Clinic at [phone] during M-F, 8:00-5pm business hours.  If you need to speak with someone after normal business hours, call [phone] and ask for the Resident on call to be paged.

## 2022-02-02 NOTE — PROGRESS NOTES
"Urology Clinic     HPI  Jordan Kennedy is a 71 year old male with history of elevated PSA, here for follow-up.        He last saw Dr. Smiley on 12/6/2021 for elevated PSA of 22.4. Prostate MRI was obtained which showed a 31 gr prostate with a PIRADs 5 lesion at the right apex mid PZ. He is therefore here for prostate biopsy     No changes to health, hospitalizations or new diagnoses in the interim    PHYSICAL EXAM  /79   Pulse 116   Ht 1.702 m (5' 7\")   Wt 80.7 kg (178 lb)   BMI 27.88 kg/m     Constitutional: AO, pleasant, NAD  Resp: Non-labored breathing on room air  Abd: Soft, NT, ND  GEORGE: 60 gm, non-tender, symmetric, no nodules, no rectal masses palpated, adequate sphincter    Labs  Lab Results   Component Value Date    WBC 4.0 03/24/2011     Lab Results   Component Value Date    RBC 5.23 03/24/2011     Lab Results   Component Value Date    HGB 16.4 03/24/2011     Lab Results   Component Value Date    HCT 49.9 03/24/2011     No components found for: MCT  Lab Results   Component Value Date    MCV 95 03/24/2011     Lab Results   Component Value Date    MCH 31.4 03/24/2011     Lab Results   Component Value Date    MCHC 32.9 03/24/2011     Lab Results   Component Value Date    RDW 12.4 03/24/2011     Lab Results   Component Value Date     03/24/2011        Last Comprehensive Metabolic Panel:  Sodium   Date Value Ref Range Status   11/10/2021 138 133 - 144 mmol/L Final   03/13/2019 141 133 - 144 mmol/L Final     Potassium   Date Value Ref Range Status   11/10/2021 4.5 3.4 - 5.3 mmol/L Final   03/13/2019 4.3 3.4 - 5.3 mmol/L Final     Chloride   Date Value Ref Range Status   11/10/2021 108 94 - 109 mmol/L Final   03/13/2019 106 94 - 109 mmol/L Final     Carbon Dioxide   Date Value Ref Range Status   03/13/2019 26 20 - 32 mmol/L Final     Carbon Dioxide (CO2)   Date Value Ref Range Status   11/10/2021 22 20 - 32 mmol/L Final     Anion Gap   Date Value Ref Range Status   11/10/2021 8 3 - 14 mmol/L " Final   03/13/2019 9 3 - 14 mmol/L Final     Glucose   Date Value Ref Range Status   11/10/2021 116 (H) 70 - 99 mg/dL Final   03/13/2019 103 (H) 70 - 99 mg/dL Final     Comment:     Fasting specimen     Urea Nitrogen   Date Value Ref Range Status   11/10/2021 19 7 - 30 mg/dL Final   03/13/2019 12 7 - 30 mg/dL Final     Creatinine   Date Value Ref Range Status   11/10/2021 0.88 0.66 - 1.25 mg/dL Final   03/13/2019 0.87 0.66 - 1.25 mg/dL Final     GFR Estimate   Date Value Ref Range Status   11/10/2021 86 >60 mL/min/1.73m2 Final     Comment:     As of July 11, 2021, eGFR is calculated by the CKD-EPI creatinine equation, without race adjustment. eGFR can be influenced by muscle mass, exercise, and diet. The reported eGFR is an estimation only and is only applicable if the renal function is stable.   03/13/2019 88 >60 mL/min/[1.73_m2] Final     Comment:     Non  GFR Calc  Starting 12/18/2018, serum creatinine based estimated GFR (eGFR) will be   calculated using the Chronic Kidney Disease Epidemiology Collaboration   (CKD-EPI) equation.       Calcium   Date Value Ref Range Status   11/10/2021 9.2 8.5 - 10.1 mg/dL Final   03/13/2019 9.5 8.5 - 10.1 mg/dL Final     Bilirubin Total   Date Value Ref Range Status   11/10/2021 0.5 0.2 - 1.3 mg/dL Final   03/24/2011 0.3 0.2 - 1.3 mg/dL Final     Alkaline Phosphatase   Date Value Ref Range Status   11/10/2021 168 (H) 40 - 150 U/L Final   03/24/2011 169 (H) 40 - 150 U/L Final     ALT   Date Value Ref Range Status   11/10/2021 26 0 - 70 U/L Final   03/13/2019 36 0 - 70 U/L Final     AST   Date Value Ref Range Status   11/10/2021 12 0 - 45 U/L Final   03/13/2019 21 0 - 45 U/L Final       PSA   Date Value Ref Range Status   04/15/2013 3.26 0 - 4 ug/L Final   03/07/2008 1.920 ng/mL    03/07/2008 1.920 ng/mL      Prostate Specific Antigen Screen   Date Value Ref Range Status   11/10/2021 22.40 (H) 0.00 - 4.00 ug/L Final          Imaging   MRI PROSTATE: 1/8/2022 10:22  AM     CLINICAL HISTORY: Elevated prostate specific antigen (PSA)     Most Recent PSA: 22.40 ug/L     Comparison: No similar study.     TECHNIQUE:  The following sequences were obtained: High-resolution axial  T2-weighted, coronal T2-weighted, 3D volumetric T2-weighted, axial  pre-contrast T1, axial diffusion-weighted, axial apparent diffusion  coefficient and axial dynamic contrast-enhanced T1. Postcontrast  images were evaluated on a separate workstation to evaluate dynamic  contrast enhancement. The technique of this exam is PI-RADS v2.1  compliant. Contrast dose: 8ml Gadavist     FINDINGS:  Size: 31 grams  Hemorrhage: Absent  Peripheral zone: Heterogeneous on T2-weighted images. Suspicious  lesions as detailed below.  Transition zone: Enlarged with BPH changes. Suspicious lesions as  detailed below.     Lesion(s) in rank order of severity (highest score- to lowest score,  then by size)      Lesion 1:  Location: Right greater than left apex, mid gland, and base peripheral  and transition zone at the 8-2 o'clock position relative to the  urethra. Series 7 image 51.   Additional prostate regions involved: There is abutment of the  external urethral sphincter. There is involvement of the anterior  fibromuscular stroma.  Size: 34 mm  T2 description: T2 lenticular shaped hypointense signal  T2 numerical assessment: 5  DWI description: Restriction on DWI low signal on ADC ranging from  700-750  DWI numerical assessment: 5  DCE assessment: Positive    Prostate margin: Capsular abutment>15 mm with capsular irregularity  and focal bulging   Lesion overall PI-RADS category: 5     Neurovascular bundles: No neurovascular bundle involvement by  malignancy.  Seminal vesicles: No seminal vesicle involvement by malignancy.  Lymph nodes: Indeterminate pelvic lymph nodes for example in the right  external iliac chain, series 7 image 7.  Bones: Heterogeneous bone marrow signal intensity with no convincing  discrete bone  lesions.  Other pelvic organs: Colonic diverticulosis. Rectal tube. Urinary  bladder wall thickening with trabeculation likely due to chronic  outlet obstructive changes. Prominent fat in the left greater than  right inguinal canal.                                                                      IMPRESSION:  1. Based on the most suspicious abnormality, this exam is  characterized as PIRADS 5 - Clinically significant cancer is highly  likely to be present.  The most suspicious abnormality is located at  the right greater than left apex, mid gland and base peripheral and  transitional zone of the prostate, 8-2:00 position and there is high  suspicion of extraprostatic extension.  2. Indeterminate pelvic lymph nodes. Heterogeneous bone marrow  attenuation with no convincing discrete bone lesions. Consider further  evaluation with nuclear medicine bone scan for better  characterization.  3. Additional incidental findings as described above.       PREPROCEDURE DIAGNOSIS: Elevated PSA   POSTPROCEDURE DIAGNOSIS: Elevated PSA.   PROCEDURE: Transrectal ultrasound sizing and transrectal ultrasound guided prostatic needle biopsy, including MRI fusion targeting    SURGEON: Ami Villeda MD   ANESTHESIA: 5 mL of 1% periprostatic block bilaterally   We previously obtained an MRI of the prostate and identified all PIRADS 3-5 lesions for targeting    Target Lesion #1 Right apex/mid lesion       DESCRIPTION OF PROCEDURE: The procedure, the outcome, the anesthesia, and the risks were discussed with the patient. Informed consent was obtained and signed and a timeout was completed prior to the procedure. Digital rectal examination was performed with the below findings noted. Anesthesia was administered as noted above and the transrectal ultrasound probe was inserted, sizing was performed, and the below findings were noted. 2 core biopsies were taken from each of the MRI targets, and 12 core biopsies were taken as described below.  The probe was then withdrawn. Patient tolerated the procedure well.   FINDINGS: Digital rectal exam reveals normal prostate. Total volume is 30 mL. US images were obtained and then fused with the MRI images. The fused images were then used to guide the biopsy of the targeted lesions with 2 cores taken of each lesion.  We then performed random biopsies.  12 cores were taken with 6 on each side, base, mid and apex.    15 total minutes spent on the prostate biopsy itself.   ASSESSMENT AND PLAN  71 year old male with elevated PSA and abnormal MRI       Plan   Call with pathology results     40 total minutes spent on the date of the encounter including direct interaction with the patient, performing chart review, documentation and further activities as noted above, exclusive of the time spent on performing prostate biopsy      Ami Villeda MD   Department of Urology   [facility]

## 2022-02-02 NOTE — NURSING NOTE
"Chief Complaint   Patient presents with     Prostate Biopsy       Blood pressure 126/79, pulse 116, height 1.702 m (5' 7\"), weight 80.7 kg (178 lb). Body mass index is 27.88 kg/m .     Verified with pt that he has been off aspirin products and blood thinners, started Cipro 500 mg BID x 3 days 22, and did a fleets enema this morning.    The following medication was given:     MEDICATION:  Gentamicin  ROUTE: IM  SITE: Ventrogluteal - Right  DOSE: 80 mg/ 2 mL  LOT #: [id number]  : [employer]  EXPIRATION DATE:   NDC#: 95547-868-85   Was there drug waste? No      Pt tolerated injection well.        Patient Active Problem List   Diagnosis     Seizure disorder (H)     Hyperlipidemia LDL goal <130     Hyperglycemia     Advanced directives, counseling/discussion     Does not have health insurance       No Known Allergies    Current Outpatient Medications   Medication Sig Dispense Refill     lovastatin (MEVACOR) 40 MG tablet Take 1 tablet (40 mg) by mouth At Bedtime 90 tablet 3     multivitamin w/minerals (MULTI-VITAMIN) tablet Take 1 tablet by mouth daily       phenytoin (DILANTIN) 100 MG capsule Take 1 capsule (100 mg) by mouth daily 90 capsule 3       Social History     Tobacco Use     Smoking status: Never Smoker     Smokeless tobacco: Never Used   Vaping Use     Vaping Use: Never used   Substance Use Topics     Alcohol use: Yes     Comment: Once in a while have a beer     Drug use: No     Invasive Procedure Safety Checklist:    Procedure: Prostate biopsy    Action: Complete sections and checkboxes as appropriate.    Pre-procedure:  1. Patient ID Verified with 2 identifiers ([place] and  or MRN) : YES    2. Procedure and site verified with patient/designee (when able) : YES    3. Accurate consent documentation in medical record : YES    4. H&P (or appropriate assessment) documented in medical record : YES  H&P must be up to 30 days prior to procedure an updated within 24 hours of                 " Procedure as applicable.     5. Relevant diagnostic and radiology test results appropriately labeled and displayed as applicable : YES    6. Blood products, implants, devices, and/or special equipment available for the procedure as applicable : YES    7. Procedure site(s) marked with provider initials [Exclusions: None] : NO    8. Marking not required. Reason : Yes  Procedure does not require site marking    Time Out:     Time-Out performed immediately prior to starting procedure, including verbal and active participation of all team members addressing: YES    1. Correct patient identity.  2. Confirmed that the correct side and site are marked.  3. An accurate procedure to be done.  4. Agreement on the procedure to be done.  5. Correct patient position.  6. Relevant images and results are properly labeled and appropriately displayed.  7. The need to administer antibiotics or fluids for irrigation purposes during the procedure as applicable.  8. Safety precautions based on patient history or medication use.    During Procedure: Verification of correct person, site, and procedure occurs any time the responsibility for care of the patient is transferred to another member of the care team.      The following medication was given:     MEDICATION:  Lidocaine without epinephrine  ROUTE: administered by physician   SITE: administered by physician   DOSE: 10 mL 1%  LOT #: [id number]   : [place]  EXPIRATION DATE: 03/25  NDC#: 52889-249-52  Was there drug waste? No    Prior to injection, verified patient identity using patient's name and date of birth.  Due to administration, patient instructed to remain in clinic for 15 minutes  afterwards, and to report any adverse reaction to me immediately.      Drug Amount Wasted:  None.  Vial/Syringe: Single dose vial    Maria Fernanda Elias CMA  2/2/2022  12:17 PM

## 2022-02-04 LAB
PATH REPORT.COMMENTS IMP SPEC: ABNORMAL
PATH REPORT.COMMENTS IMP SPEC: ABNORMAL
PATH REPORT.COMMENTS IMP SPEC: YES
PATH REPORT.FINAL DX SPEC: ABNORMAL
PATH REPORT.GROSS SPEC: ABNORMAL
PATH REPORT.MICROSCOPIC SPEC OTHER STN: ABNORMAL
PHOTO IMAGE: ABNORMAL

## 2022-02-09 ENCOUNTER — OFFICE VISIT (OUTPATIENT)
Dept: UROLOGY | Facility: CLINIC | Age: 72
End: 2022-02-09
Payer: MEDICARE

## 2022-02-09 VITALS
HEART RATE: 114 BPM | WEIGHT: 180 LBS | SYSTOLIC BLOOD PRESSURE: 123 MMHG | BODY MASS INDEX: 28.25 KG/M2 | DIASTOLIC BLOOD PRESSURE: 79 MMHG | HEIGHT: 67 IN

## 2022-02-09 DIAGNOSIS — C61 PROSTATE CANCER (H): Primary | ICD-10-CM

## 2022-02-09 PROCEDURE — 99215 OFFICE O/P EST HI 40 MIN: CPT | Performed by: UROLOGY

## 2022-02-09 ASSESSMENT — PAIN SCALES - GENERAL: PAINLEVEL: NO PAIN (0)

## 2022-02-09 ASSESSMENT — MIFFLIN-ST. JEOR: SCORE: 1525.1

## 2022-02-09 NOTE — PROGRESS NOTES
"Urology Clinic     HPI  Jordan Kennedy is a 71 year old male with history of elevated PSA, here to discuss pathology.        He last saw Dr. Smiley on 12/6/2021 for elevated PSA of 22.4. Prostate MRI was obtained which showed a 31 gr prostate with a PIRADs 5 lesion at the right apex mid PZ.     He underwent prostate biopsy on 2/2/2022. He had no issues after the biopsy     No changes to health, hospitalizations or new diagnoses in the interim    PHYSICAL EXAM  /79   Pulse 114   Ht 1.702 m (5' 7\")   Wt 81.6 kg (180 lb)   BMI 28.19 kg/m     Constitutional: AO, pleasant, NAD  Resp: Non-labored breathing on room air  Abd: Soft, NT, ND  GEORGE: Deferred     Labs  Lab Results   Component Value Date    WBC 4.0 03/24/2011     Lab Results   Component Value Date    RBC 5.23 03/24/2011     Lab Results   Component Value Date    HGB 16.4 03/24/2011     Lab Results   Component Value Date    HCT 49.9 03/24/2011     No components found for: MCT  Lab Results   Component Value Date    MCV 95 03/24/2011     Lab Results   Component Value Date    MCH 31.4 03/24/2011     Lab Results   Component Value Date    MCHC 32.9 03/24/2011     Lab Results   Component Value Date    RDW 12.4 03/24/2011     Lab Results   Component Value Date     03/24/2011        Last Comprehensive Metabolic Panel:  Sodium   Date Value Ref Range Status   11/10/2021 138 133 - 144 mmol/L Final   03/13/2019 141 133 - 144 mmol/L Final     Potassium   Date Value Ref Range Status   11/10/2021 4.5 3.4 - 5.3 mmol/L Final   03/13/2019 4.3 3.4 - 5.3 mmol/L Final     Chloride   Date Value Ref Range Status   11/10/2021 108 94 - 109 mmol/L Final   03/13/2019 106 94 - 109 mmol/L Final     Carbon Dioxide   Date Value Ref Range Status   03/13/2019 26 20 - 32 mmol/L Final     Carbon Dioxide (CO2)   Date Value Ref Range Status   11/10/2021 22 20 - 32 mmol/L Final     Anion Gap   Date Value Ref Range Status   11/10/2021 8 3 - 14 mmol/L Final   03/13/2019 9 3 - 14 mmol/L " Final     Glucose   Date Value Ref Range Status   11/10/2021 116 (H) 70 - 99 mg/dL Final   03/13/2019 103 (H) 70 - 99 mg/dL Final     Comment:     Fasting specimen     Urea Nitrogen   Date Value Ref Range Status   11/10/2021 19 7 - 30 mg/dL Final   03/13/2019 12 7 - 30 mg/dL Final     Creatinine   Date Value Ref Range Status   11/10/2021 0.88 0.66 - 1.25 mg/dL Final   03/13/2019 0.87 0.66 - 1.25 mg/dL Final     GFR Estimate   Date Value Ref Range Status   11/10/2021 86 >60 mL/min/1.73m2 Final     Comment:     As of July 11, 2021, eGFR is calculated by the CKD-EPI creatinine equation, without race adjustment. eGFR can be influenced by muscle mass, exercise, and diet. The reported eGFR is an estimation only and is only applicable if the renal function is stable.   03/13/2019 88 >60 mL/min/[1.73_m2] Final     Comment:     Non  GFR Calc  Starting 12/18/2018, serum creatinine based estimated GFR (eGFR) will be   calculated using the Chronic Kidney Disease Epidemiology Collaboration   (CKD-EPI) equation.       Calcium   Date Value Ref Range Status   11/10/2021 9.2 8.5 - 10.1 mg/dL Final   03/13/2019 9.5 8.5 - 10.1 mg/dL Final     Bilirubin Total   Date Value Ref Range Status   11/10/2021 0.5 0.2 - 1.3 mg/dL Final   03/24/2011 0.3 0.2 - 1.3 mg/dL Final     Alkaline Phosphatase   Date Value Ref Range Status   11/10/2021 168 (H) 40 - 150 U/L Final   03/24/2011 169 (H) 40 - 150 U/L Final     ALT   Date Value Ref Range Status   11/10/2021 26 0 - 70 U/L Final   03/13/2019 36 0 - 70 U/L Final     AST   Date Value Ref Range Status   11/10/2021 12 0 - 45 U/L Final   03/13/2019 21 0 - 45 U/L Final       PSA   Date Value Ref Range Status   04/15/2013 3.26 0 - 4 ug/L Final   03/07/2008 1.920 ng/mL    03/07/2008 1.920 ng/mL      Prostate Specific Antigen Screen   Date Value Ref Range Status   11/10/2021 22.40 (H) 0.00 - 4.00 ug/L Final          Imaging   MRI PROSTATE: 1/8/2022 10:22 AM     CLINICAL HISTORY: Elevated  prostate specific antigen (PSA)     Most Recent PSA: 22.40 ug/L     Comparison: No similar study.     TECHNIQUE:  The following sequences were obtained: High-resolution axial  T2-weighted, coronal T2-weighted, 3D volumetric T2-weighted, axial  pre-contrast T1, axial diffusion-weighted, axial apparent diffusion  coefficient and axial dynamic contrast-enhanced T1. Postcontrast  images were evaluated on a separate workstation to evaluate dynamic  contrast enhancement. The technique of this exam is PI-RADS v2.1  compliant. Contrast dose: 8ml Gadavist      FINDINGS:  Size: 31 grams  Hemorrhage: Absent  Peripheral zone: Heterogeneous on T2-weighted images. Suspicious  lesions as detailed below.  Transition zone: Enlarged with BPH changes. Suspicious lesions as  detailed below.     Lesion(s) in rank order of severity (highest score- to lowest score,  then by size)      Lesion 1:  Location: Right greater than left apex, mid gland, and base peripheral  and transition zone at the 8-2 o'clock position relative to the  urethra. Series 7 image 51.   Additional prostate regions involved: There is abutment of the  external urethral sphincter. There is involvement of the anterior  fibromuscular stroma.  Size: 34 mm  T2 description: T2 lenticular shaped hypointense signal  T2 numerical assessment: 5  DWI description: Restriction on DWI low signal on ADC ranging from  700-750  DWI numerical assessment: 5  DCE assessment: Positive    Prostate margin: Capsular abutment>15 mm with capsular irregularity  and focal bulging   Lesion overall PI-RADS category: 5     Neurovascular bundles: No neurovascular bundle involvement by  malignancy.  Seminal vesicles: No seminal vesicle involvement by malignancy.  Lymph nodes: Indeterminate pelvic lymph nodes for example in the right  external iliac chain, series 7 image 7.  Bones: Heterogeneous bone marrow signal intensity with no convincing  discrete bone lesions.  Other pelvic organs: Colonic  diverticulosis. Rectal tube. Urinary  bladder wall thickening with trabeculation likely due to chronic  outlet obstructive changes. Prominent fat in the left greater than  right inguinal canal.                                                                      IMPRESSION:  1. Based on the most suspicious abnormality, this exam is  characterized as PIRADS 5 - Clinically significant cancer is highly  likely to be present.  The most suspicious abnormality is located at  the right greater than left apex, mid gland and base peripheral and  transitional zone of the prostate, 8-2:00 position and there is high  suspicion of extraprostatic extension.  2. Indeterminate pelvic lymph nodes. Heterogeneous bone marrow  attenuation with no convincing discrete bone lesions. Consider further  evaluation with nuclear medicine bone scan for better  characterization.  3. Additional incidental findings as described above.    Biopsy pathology     Case: [id number]                                   Authorizing Provider:  Ami Villeda MD          Collected:           02/02/2022 01:21 PM           Ordering Location:     [facility]  Received:            02/02/2022 06:13 PM                                  [facility]                                                            Pathologist:           Jadon Harrison MD                                                            Specimens:   A) - Prostate, [place], Left, Prostate needle biopsy, apex, left                                        B) - Prostate, Mid, Left, Prostate needle biopsy, mid, left                                          C) - Prostate, Base, Left, Prostate needle biopsy, base, left                                        D) - Prostate, [place], Right, Prostate needle biopsy, apex, right                                      E) - Prostate, Mid, Right, Prostate needle biopsy, mid, right                                        F) - Prostate, Base, Right,  Prostate needle biopsy, base, right                                      G) - Prostate, Target 1                                                                    Final Diagnosis   A.  Prostate, left apex, biopsy:  - Benign prostate tissue    B.  Prostate, left mid, biopsy:  - Benign prostate tissue    C.  Prostate, left base, biopsy:  - Benign prostate tissue    D.  Prostate, right apex, biopsy:  - Benign prostate tissue    E.  Prostate, right mid, biopsy:  - Benign prostate tissue    F.  Prostate, right base, biopsy:  - Benign prostate tissue    G.  Prostate, target 1, biopsy:  - Prostatic adenocarcinoma  - [place] score 7 (3+4; 10% pattern 4)  - Grade group 2  - Extent: Involves 2 of 2 cores (5 mm, 30% and 5 mm, 30%)   Electronically signed by Jadon Harrison MD on 2/4/2022 at  8:46 PM     ASSESSMENT AND PLAN  71 year old male with relatively low volume favorable intermediate risk prostate cancer     We had an extensive discussion about the significance of localized, prostate cancer. We discussed the options for treatment of a localized prostate cancer including active surveillance, brachytherapy, external beam radiation therapy, and surgical removal.    The majority of our discussion related to active surveillance for prostate cancer. We discussed that this is a common treatment decision for men with very low and low risk prostate cancer and is endorsed by major urologic guidelines. We also discussed that approximately half of men eventually pursue active treatment while on surveillance. This could also be used for highly selected patients with intermediate risk prostate cancer but the risk of progression and requiring treatment while on active surveillance. We discussed that surveillance typically involves PSA rechecks every 6 months with intermittent repeat MRIs and repeat biopsies. There is also a risk of upstaging at the time of surgery which could be determined by genomic tests such as prostate decipher.        We discussed the relative merits of robotic assisted radical prostatectomy. We also discussed the advantages and disadvantages and roles of open surgery vs. laparoscopic (and [place] Farrah assisted) surgery. The anticipated post-operative course was explained, including an anticipated 1-2 day hospital stay. Catheter will remain in place 10-14 days.  The risks, benefits, alternatives, and personnel involved in the procedure were discussed. Specifically, we discussed that risks include but are not limited to anesthetic complications including stroke, MI, DVT/PE, as well as risk of bleeding, bowel injury, infection, lymphocele, urine leak and other potentially unforseen complications. Also discussed the risk of bladder neck contracture and other urinary symptoms after procedure. In addition, we discussed risk of urinary incontinence and erectile dysfunction following the procedure. Finally, we discussed risk for adverse pathologic features, including positive surgical margins and potential for post-surgical radiation, hormone ablation and long-term need for PSA monitoring,.  All questions were answered in detail.     We discussed that there was no clear evidence of advantage between surgery and radiation with regard to risk of recurrence. We discussed option for discussion with radiation oncology, but patient would prefer to defer this at this time.      Patient would like to think about his option but leaning more towards active surveillance     Plan   Send prostate MetaMaterials   Will talk to the patient about the results and his final decision     40 total minutes spent on the date of the encounter including direct interaction with the patient, performing chart review, documentation and further activities as noted above    Ami Villeda MD   Department of Urology   [facility]       MetaMaterials prostate results showed intermediate risk group with 4.5% and 8.8% risk of metastasis in 5- and 10- years, respectively  even with definitive treatment. I readdressed the treatment options with him and I reiterated that my recommendation would be active treatment for his disease. Patient would like to continue with active surveillance at this point.     Plan   Follow up in 6 months with PSA             Ami Villeda MD

## 2022-02-09 NOTE — LETTER
Date:February 11, 2022      Patient was self referred, no letter generated. Do not send.        [facility] Health Information

## 2022-02-09 NOTE — PATIENT INSTRUCTIONS
Dr. Villeda will call you with the results of the DECIPHER test     It was a pleasure meeting with you today.  Thank you for allowing me and my team the privilege of caring for you today.  YOU are the reason we are here, and I truly hope we provided you with the excellent service you deserve.  Please let us know if there is anything else we can do for you so that we can be sure you are leaving completely satisfied with your care experience.

## 2022-02-09 NOTE — LETTER
"2/9/2022       RE: Jordan Kennedy  [address] [facility] [address]     Dear Colleague,    Thank you for referring your patient, Jordan Kennedy, to the [facility] UROLOGY CLINIC [place] at [facility]. Please see a copy of my visit note below.    Urology Clinic     HPI  Jordan Kennedy is a 71 year old male with history of elevated PSA, here to discuss pathology.        He last saw Dr. Smiley on 12/6/2021 for elevated PSA of 22.4. Prostate MRI was obtained which showed a 31 gr prostate with a PIRADs 5 lesion at the right apex mid PZ.     He underwent prostate biopsy on 2/2/2022. He had no issues after the biopsy     No changes to health, hospitalizations or new diagnoses in the interim    PHYSICAL EXAM  /79   Pulse 114   Ht 1.702 m (5' 7\")   Wt 81.6 kg (180 lb)   BMI 28.19 kg/m     Constitutional: AO, pleasant, NAD  Resp: Non-labored breathing on room air  Abd: Soft, NT, ND  GEORGE: Deferred     Labs  Lab Results   Component Value Date    WBC 4.0 03/24/2011     Lab Results   Component Value Date    RBC 5.23 03/24/2011     Lab Results   Component Value Date    HGB 16.4 03/24/2011     Lab Results   Component Value Date    HCT 49.9 03/24/2011     No components found for: MCT  Lab Results   Component Value Date    MCV 95 03/24/2011     Lab Results   Component Value Date    MCH 31.4 03/24/2011     Lab Results   Component Value Date    MCHC 32.9 03/24/2011     Lab Results   Component Value Date    RDW 12.4 03/24/2011     Lab Results   Component Value Date     03/24/2011        Last Comprehensive Metabolic Panel:  Sodium   Date Value Ref Range Status   11/10/2021 138 133 - 144 mmol/L Final   03/13/2019 141 133 - 144 mmol/L Final     Potassium   Date Value Ref Range Status   11/10/2021 4.5 3.4 - 5.3 mmol/L Final   03/13/2019 4.3 3.4 - 5.3 mmol/L Final     Chloride   Date Value Ref Range Status   11/10/2021 108 94 - 109 mmol/L Final "   03/13/2019 106 94 - 109 mmol/L Final     Carbon Dioxide   Date Value Ref Range Status   03/13/2019 26 20 - 32 mmol/L Final     Carbon Dioxide (CO2)   Date Value Ref Range Status   11/10/2021 22 20 - 32 mmol/L Final     Anion Gap   Date Value Ref Range Status   11/10/2021 8 3 - 14 mmol/L Final   03/13/2019 9 3 - 14 mmol/L Final     Glucose   Date Value Ref Range Status   11/10/2021 116 (H) 70 - 99 mg/dL Final   03/13/2019 103 (H) 70 - 99 mg/dL Final     Comment:     Fasting specimen     Urea Nitrogen   Date Value Ref Range Status   11/10/2021 19 7 - 30 mg/dL Final   03/13/2019 12 7 - 30 mg/dL Final     Creatinine   Date Value Ref Range Status   11/10/2021 0.88 0.66 - 1.25 mg/dL Final   03/13/2019 0.87 0.66 - 1.25 mg/dL Final     GFR Estimate   Date Value Ref Range Status   11/10/2021 86 >60 mL/min/1.73m2 Final     Comment:     As of July 11, 2021, eGFR is calculated by the CKD-EPI creatinine equation, without race adjustment. eGFR can be influenced by muscle mass, exercise, and diet. The reported eGFR is an estimation only and is only applicable if the renal function is stable.   03/13/2019 88 >60 mL/min/[1.73_m2] Final     Comment:     Non  GFR Calc  Starting 12/18/2018, serum creatinine based estimated GFR (eGFR) will be   calculated using the Chronic Kidney Disease Epidemiology Collaboration   (CKD-EPI) equation.       Calcium   Date Value Ref Range Status   11/10/2021 9.2 8.5 - 10.1 mg/dL Final   03/13/2019 9.5 8.5 - 10.1 mg/dL Final     Bilirubin Total   Date Value Ref Range Status   11/10/2021 0.5 0.2 - 1.3 mg/dL Final   03/24/2011 0.3 0.2 - 1.3 mg/dL Final     Alkaline Phosphatase   Date Value Ref Range Status   11/10/2021 168 (H) 40 - 150 U/L Final   03/24/2011 169 (H) 40 - 150 U/L Final     ALT   Date Value Ref Range Status   11/10/2021 26 0 - 70 U/L Final   03/13/2019 36 0 - 70 U/L Final     AST   Date Value Ref Range Status   11/10/2021 12 0 - 45 U/L Final   03/13/2019 21 0 - 45 U/L Final        PSA   Date Value Ref Range Status   04/15/2013 3.26 0 - 4 ug/L Final   03/07/2008 1.920 ng/mL    03/07/2008 1.920 ng/mL      Prostate Specific Antigen Screen   Date Value Ref Range Status   11/10/2021 22.40 (H) 0.00 - 4.00 ug/L Final          Imaging   MRI PROSTATE: 1/8/2022 10:22 AM     CLINICAL HISTORY: Elevated prostate specific antigen (PSA)     Most Recent PSA: 22.40 ug/L     Comparison: No similar study.     TECHNIQUE:  The following sequences were obtained: High-resolution axial  T2-weighted, coronal T2-weighted, 3D volumetric T2-weighted, axial  pre-contrast T1, axial diffusion-weighted, axial apparent diffusion  coefficient and axial dynamic contrast-enhanced T1. Postcontrast  images were evaluated on a separate workstation to evaluate dynamic  contrast enhancement. The technique of this exam is PI-RADS v2.1  compliant. Contrast dose: 8ml Gadavist      FINDINGS:  Size: 31 grams  Hemorrhage: Absent  Peripheral zone: Heterogeneous on T2-weighted images. Suspicious  lesions as detailed below.  Transition zone: Enlarged with BPH changes. Suspicious lesions as  detailed below.     Lesion(s) in rank order of severity (highest score- to lowest score,  then by size)      Lesion 1:  Location: Right greater than left apex, mid gland, and base peripheral  and transition zone at the 8-2 o'clock position relative to the  urethra. Series 7 image 51.   Additional prostate regions involved: There is abutment of the  external urethral sphincter. There is involvement of the anterior  fibromuscular stroma.  Size: 34 mm  T2 description: T2 lenticular shaped hypointense signal  T2 numerical assessment: 5  DWI description: Restriction on DWI low signal on ADC ranging from  700-750  DWI numerical assessment: 5  DCE assessment: Positive    Prostate margin: Capsular abutment>15 mm with capsular irregularity  and focal bulging   Lesion overall PI-RADS category: 5     Neurovascular bundles: No neurovascular bundle involvement  by  malignancy.  Seminal vesicles: No seminal vesicle involvement by malignancy.  Lymph nodes: Indeterminate pelvic lymph nodes for example in the right  external iliac chain, series 7 image 7.  Bones: Heterogeneous bone marrow signal intensity with no convincing  discrete bone lesions.  Other pelvic organs: Colonic diverticulosis. Rectal tube. Urinary  bladder wall thickening with trabeculation likely due to chronic  outlet obstructive changes. Prominent fat in the left greater than  right inguinal canal.                                                                      IMPRESSION:  1. Based on the most suspicious abnormality, this exam is  characterized as PIRADS 5 - Clinically significant cancer is highly  likely to be present.  The most suspicious abnormality is located at  the right greater than left apex, mid gland and base peripheral and  transitional zone of the prostate, 8-2:00 position and there is high  suspicion of extraprostatic extension.  2. Indeterminate pelvic lymph nodes. Heterogeneous bone marrow  attenuation with no convincing discrete bone lesions. Consider further  evaluation with nuclear medicine bone scan for better  characterization.  3. Additional incidental findings as described above.    Biopsy pathology     Case: [id number]                                   Authorizing Provider:  Ami Villeda MD          Collected:           02/02/2022 01:21 PM           Ordering Location:     [facility]  Received:            02/02/2022 06:13 PM                                  [facility]                                                            Pathologist:           Jadon Harrison MD                                                            Specimens:   A) - Prostate, [place], Left, Prostate needle biopsy, apex, left                                        B) - Prostate, Mid, Left, Prostate needle biopsy, mid, left                                          C) - Prostate, Base,  Left, Prostate needle biopsy, base, left                                        D) - Prostate, Rhe[place], Right, Prostate needle biopsy, apex, right                                      E) - Prostate, Mid, Right, Prostate needle biopsy, mid, right                                        F) - Prostate, Base, Right, Prostate needle biopsy, base, right                                      G) - Prostate, Target 1                                                                    Final Diagnosis   A.  Prostate, left apex, biopsy:  - Benign prostate tissue    B.  Prostate, left mid, biopsy:  - Benign prostate tissue    C.  Prostate, left base, biopsy:  - Benign prostate tissue    D.  Prostate, right apex, biopsy:  - Benign prostate tissue    E.  Prostate, right mid, biopsy:  - Benign prostate tissue    F.  Prostate, right base, biopsy:  - Benign prostate tissue    G.  Prostate, target 1, biopsy:  - Prostatic adenocarcinoma  - Jayesh score 7 (3+4; 10% pattern 4)  - Grade group 2  - Extent: Involves 2 of 2 cores (5 mm, 30% and 5 mm, 30%)   Electronically signed by Jadon Harrison MD on 2/4/2022 at  8:46 PM     ASSESSMENT AND PLAN  71 year old male with relatively low volume favorable intermediate risk prostate cancer     We had an extensive discussion about the significance of localized, prostate cancer. We discussed the options for treatment of a localized prostate cancer including active surveillance, brachytherapy, external beam radiation therapy, and surgical removal.    The majority of our discussion related to active surveillance for prostate cancer. We discussed that this is a common treatment decision for men with very low and low risk prostate cancer and is endorsed by major urologic guidelines. We also discussed that approximately half of men eventually pursue active treatment while on surveillance. This could also be used for highly selected patients with intermediate risk prostate cancer but the risk of progression  and requiring treatment while on active surveillance. We discussed that surveillance typically involves PSA rechecks every 6 months with intermittent repeat MRIs and repeat biopsies. There is also a risk of upstaging at the time of surgery which could be determined by genomic tests such as prostate decipher.       We discussed the relative merits of robotic assisted radical prostatectomy. We also discussed the advantages and disadvantages and roles of open surgery vs. laparoscopic (and Da Farrah assisted) surgery. The anticipated post-operative course was explained, including an anticipated 1-2 day hospital stay. Catheter will remain in place 10-14 days.  The risks, benefits, alternatives, and personnel involved in the procedure were discussed. Specifically, we discussed that risks include but are not limited to anesthetic complications including stroke, MI, DVT/PE, as well as risk of bleeding, bowel injury, infection, lymphocele, urine leak and other potentially unforseen complications. Also discussed the risk of bladder neck contracture and other urinary symptoms after procedure. In addition, we discussed risk of urinary incontinence and erectile dysfunction following the procedure. Finally, we discussed risk for adverse pathologic features, including positive surgical margins and potential for post-surgical radiation, hormone ablation and long-term need for PSA monitoring,.  All questions were answered in detail.     We discussed that there was no clear evidence of advantage between surgery and radiation with regard to risk of recurrence. We discussed option for discussion with radiation oncology, but patient would prefer to defer this at this time.      Patient would like to think about his option but leaning more towards active surveillance     Plan   Send prostate [employer]   Will talk to the patient about the results and his final decision     40 total minutes spent on the date of the encounter including direct  interaction with the patient, performing chart review, documentation and further activities as noted above    Ami Villeda MD   Department of Urology   [facility]                     Again, thank you for allowing me to participate in the care of your patient.      Sincerely,    Ami Villeda MD

## 2022-02-09 NOTE — NURSING NOTE
"Chief Complaint   Patient presents with     Follow Up     review biopsy results       Blood pressure 123/79, pulse 114, height 1.702 m (5' 7\"), weight 81.6 kg (180 lb). Body mass index is 28.19 kg/m .    Patient Active Problem List   Diagnosis     Seizure disorder (H)     Hyperlipidemia LDL goal <130     Hyperglycemia     Advanced directives, counseling/discussion     Does not have health insurance       No Known Allergies    Current Outpatient Medications   Medication Sig Dispense Refill     lovastatin (MEVACOR) 40 MG tablet Take 1 tablet (40 mg) by mouth At Bedtime 90 tablet 3     multivitamin w/minerals (MULTI-VITAMIN) tablet Take 1 tablet by mouth daily       phenytoin (DILANTIN) 100 MG capsule Take 1 capsule (100 mg) by mouth daily 90 capsule 3       Social History     Tobacco Use     Smoking status: Never Smoker     Smokeless tobacco: Never Used   Vaping Use     Vaping Use: Never used   Substance Use Topics     Alcohol use: Yes     Comment: Once in a while have a beer     Drug use: No       Mayurilorna Sun  2/9/2022  1:56 PM  "

## 2022-02-15 ENCOUNTER — TELEPHONE (OUTPATIENT)
Dept: UROLOGY | Facility: CLINIC | Age: 72
End: 2022-02-15
Payer: MEDICARE

## 2022-02-15 NOTE — TELEPHONE ENCOUNTER
----- Message from Ami Villeda MD sent at 2/9/2022  2:33 PM CST -----  Zuhair Christensen,     Could we send his slides for prostate Decipher.    Thanks   Ami

## 2022-02-15 NOTE — TELEPHONE ENCOUNTER
Action 02/15/22 MMT   Action Taken  CSS submitted order for Decipher Genomic Testing with supporting documentation, per provider request.

## 2022-03-01 LAB — SCANNED LAB RESULT: NORMAL

## 2022-03-03 ENCOUNTER — TELEPHONE (OUTPATIENT)
Dept: UROLOGY | Facility: CLINIC | Age: 72
End: 2022-03-03
Payer: MEDICARE

## 2022-08-11 ENCOUNTER — LAB (OUTPATIENT)
Dept: LAB | Facility: CLINIC | Age: 72
End: 2022-08-11
Payer: MEDICARE

## 2022-08-11 DIAGNOSIS — C61 PROSTATE CANCER (H): ICD-10-CM

## 2022-08-11 PROCEDURE — 84153 ASSAY OF PSA TOTAL: CPT

## 2022-08-11 PROCEDURE — 36415 COLL VENOUS BLD VENIPUNCTURE: CPT

## 2022-08-12 LAB — PSA SERPL-MCNC: 27.8 UG/L (ref 0–4)

## 2022-08-18 ENCOUNTER — PRE VISIT (OUTPATIENT)
Dept: UROLOGY | Facility: CLINIC | Age: 72
End: 2022-08-18

## 2022-08-18 DIAGNOSIS — C61 PROSTATE CANCER (H): Primary | ICD-10-CM

## 2022-08-18 NOTE — CONFIDENTIAL NOTE
Reason for visit: psa check     Relevant information: hx prostate cacner    Records/imaging/labs/orders: in epic- psa on 8/11/2022    Pt called: n/a    At Rooming: ines Sun  8/18/2022  1:21 PM

## 2022-09-07 ENCOUNTER — OFFICE VISIT (OUTPATIENT)
Dept: UROLOGY | Facility: CLINIC | Age: 72
End: 2022-09-07
Payer: MEDICARE

## 2022-09-07 VITALS
HEART RATE: 112 BPM | SYSTOLIC BLOOD PRESSURE: 129 MMHG | HEIGHT: 67 IN | BODY MASS INDEX: 28.25 KG/M2 | DIASTOLIC BLOOD PRESSURE: 83 MMHG | WEIGHT: 180 LBS

## 2022-09-07 DIAGNOSIS — C61 PROSTATE CANCER (H): Primary | ICD-10-CM

## 2022-09-07 PROCEDURE — 99214 OFFICE O/P EST MOD 30 MIN: CPT | Performed by: UROLOGY

## 2022-09-07 ASSESSMENT — PAIN SCALES - GENERAL: PAINLEVEL: NO PAIN (0)

## 2022-09-07 NOTE — LETTER
"9/7/2022       RE: Jordan Kennedy  [address] [facility] [address]     Dear Colleague,    Thank you for referring your patient, Jordan Kennedy, to the [facility] UROLOGY CLINIC [place] at [facility]. Please see a copy of my visit note below.    Urology Clinic     HPI  Jordan Kennedy is a 72 year old male with history of intermediate risk prostate cancer on active surveillance, here for follow-up.      He has been doing well with no changes to health, hospitalizations or new diagnoses in the interim. He is concerned about the rising PSA    PHYSICAL EXAM  /83   Pulse 112   Ht 1.702 m (5' 7\")   Wt 81.6 kg (180 lb)   BMI 28.19 kg/m     Constitutional: AO, pleasant, NAD  Resp: Non-labored breathing on room air  Abd: Soft, NT, ND  GEORGE: Deferred     Labs  Lab Results   Component Value Date    WBC 4.0 03/24/2011     Lab Results   Component Value Date    RBC 5.23 03/24/2011     Lab Results   Component Value Date    HGB 16.4 03/24/2011     Lab Results   Component Value Date    HCT 49.9 03/24/2011     No components found for: MCT  Lab Results   Component Value Date    MCV 95 03/24/2011     Lab Results   Component Value Date    MCH 31.4 03/24/2011     Lab Results   Component Value Date    MCHC 32.9 03/24/2011     Lab Results   Component Value Date    RDW 12.4 03/24/2011     Lab Results   Component Value Date     03/24/2011        Last Comprehensive Metabolic Panel:  Sodium   Date Value Ref Range Status   11/10/2021 138 133 - 144 mmol/L Final   03/13/2019 141 133 - 144 mmol/L Final     Potassium   Date Value Ref Range Status   11/10/2021 4.5 3.4 - 5.3 mmol/L Final   03/13/2019 4.3 3.4 - 5.3 mmol/L Final     Chloride   Date Value Ref Range Status   11/10/2021 108 94 - 109 mmol/L Final   03/13/2019 106 94 - 109 mmol/L Final     Carbon Dioxide   Date Value Ref Range Status   03/13/2019 26 20 - 32 mmol/L Final     Carbon Dioxide (CO2) "   Date Value Ref Range Status   11/10/2021 22 20 - 32 mmol/L Final     Anion Gap   Date Value Ref Range Status   11/10/2021 8 3 - 14 mmol/L Final   03/13/2019 9 3 - 14 mmol/L Final     Glucose   Date Value Ref Range Status   11/10/2021 116 (H) 70 - 99 mg/dL Final   03/13/2019 103 (H) 70 - 99 mg/dL Final     Comment:     Fasting specimen     Urea Nitrogen   Date Value Ref Range Status   11/10/2021 19 7 - 30 mg/dL Final   03/13/2019 12 7 - 30 mg/dL Final     Creatinine   Date Value Ref Range Status   11/10/2021 0.88 0.66 - 1.25 mg/dL Final   03/13/2019 0.87 0.66 - 1.25 mg/dL Final     GFR Estimate   Date Value Ref Range Status   11/10/2021 86 >60 mL/min/1.73m2 Final     Comment:     As of July 11, 2021, eGFR is calculated by the CKD-EPI creatinine equation, without race adjustment. eGFR can be influenced by muscle mass, exercise, and diet. The reported eGFR is an estimation only and is only applicable if the renal function is stable.   03/13/2019 88 >60 mL/min/[1.73_m2] Final     Comment:     Non  GFR Calc  Starting 12/18/2018, serum creatinine based estimated GFR (eGFR) will be   calculated using the Chronic Kidney Disease Epidemiology Collaboration   (CKD-EPI) equation.       Calcium   Date Value Ref Range Status   11/10/2021 9.2 8.5 - 10.1 mg/dL Final   03/13/2019 9.5 8.5 - 10.1 mg/dL Final     Bilirubin Total   Date Value Ref Range Status   11/10/2021 0.5 0.2 - 1.3 mg/dL Final   03/24/2011 0.3 0.2 - 1.3 mg/dL Final     Alkaline Phosphatase   Date Value Ref Range Status   11/10/2021 168 (H) 40 - 150 U/L Final   03/24/2011 169 (H) 40 - 150 U/L Final     ALT   Date Value Ref Range Status   11/10/2021 26 0 - 70 U/L Final   03/13/2019 36 0 - 70 U/L Final     AST   Date Value Ref Range Status   11/10/2021 12 0 - 45 U/L Final   03/13/2019 21 0 - 45 U/L Final       PSA   Date Value Ref Range Status   04/15/2013 3.26 0 - 4 ug/L Final   03/07/2008 1.920 ng/mL    03/07/2008 1.920 ng/mL      Prostate Specific  Antigen Screen   Date Value Ref Range Status   11/10/2021 22.40 (H) 0.00 - 4.00 ug/L Final     PSA Tumor Marker   Date Value Ref Range Status   08/11/2022 27.80 (H) 0.00 - 4.00 ug/L Final          Imaging   No new imaging to review     ASSESSMENT AND PLAN  72 year old male with intermediate risk prostate cancer with rising PSA     We had a long discussion before about different management options and he opted for active surveillance. He is now worried about the rising PSA. He has reservation about surgery. We reviewed the perioperative course in details. He is interested in learning more about radiation.       Plan   Referral to radiation oncology       30 total minutes spent on the date of the encounter including direct interaction with the patient, performing chart review, documentation and further activities as noted above    Ami Villeda MD   Department of Urology   [facility]                         Again, thank you for allowing me to participate in the care of your patient.      Sincerely,    Ami Villeda MD

## 2022-09-07 NOTE — PROGRESS NOTES
"Urology Clinic     HPI  Jordan Kennedy is a 72 year old male with history of intermediate risk prostate cancer on active surveillance, here for follow-up.      He has been doing well with no changes to health, hospitalizations or new diagnoses in the interim. He is concerned about the rising PSA    PHYSICAL EXAM  /83   Pulse 112   Ht 1.702 m (5' 7\")   Wt 81.6 kg (180 lb)   BMI 28.19 kg/m     Constitutional: AO, pleasant, NAD  Resp: Non-labored breathing on room air  Abd: Soft, NT, ND  GEORGE: Deferred     Labs  Lab Results   Component Value Date    WBC 4.0 03/24/2011     Lab Results   Component Value Date    RBC 5.23 03/24/2011     Lab Results   Component Value Date    HGB 16.4 03/24/2011     Lab Results   Component Value Date    HCT 49.9 03/24/2011     No components found for: MCT  Lab Results   Component Value Date    MCV 95 03/24/2011     Lab Results   Component Value Date    MCH 31.4 03/24/2011     Lab Results   Component Value Date    MCHC 32.9 03/24/2011     Lab Results   Component Value Date    RDW 12.4 03/24/2011     Lab Results   Component Value Date     03/24/2011        Last Comprehensive Metabolic Panel:  Sodium   Date Value Ref Range Status   11/10/2021 138 133 - 144 mmol/L Final   03/13/2019 141 133 - 144 mmol/L Final     Potassium   Date Value Ref Range Status   11/10/2021 4.5 3.4 - 5.3 mmol/L Final   03/13/2019 4.3 3.4 - 5.3 mmol/L Final     Chloride   Date Value Ref Range Status   11/10/2021 108 94 - 109 mmol/L Final   03/13/2019 106 94 - 109 mmol/L Final     Carbon Dioxide   Date Value Ref Range Status   03/13/2019 26 20 - 32 mmol/L Final     Carbon Dioxide (CO2)   Date Value Ref Range Status   11/10/2021 22 20 - 32 mmol/L Final     Anion Gap   Date Value Ref Range Status   11/10/2021 8 3 - 14 mmol/L Final   03/13/2019 9 3 - 14 mmol/L Final     Glucose   Date Value Ref Range Status   11/10/2021 116 (H) 70 - 99 mg/dL Final   03/13/2019 103 (H) 70 - 99 mg/dL Final     Comment:     " Fasting specimen     Urea Nitrogen   Date Value Ref Range Status   11/10/2021 19 7 - 30 mg/dL Final   03/13/2019 12 7 - 30 mg/dL Final     Creatinine   Date Value Ref Range Status   11/10/2021 0.88 0.66 - 1.25 mg/dL Final   03/13/2019 0.87 0.66 - 1.25 mg/dL Final     GFR Estimate   Date Value Ref Range Status   11/10/2021 86 >60 mL/min/1.73m2 Final     Comment:     As of July 11, 2021, eGFR is calculated by the CKD-EPI creatinine equation, without race adjustment. eGFR can be influenced by muscle mass, exercise, and diet. The reported eGFR is an estimation only and is only applicable if the renal function is stable.   03/13/2019 88 >60 mL/min/[1.73_m2] Final     Comment:     Non  GFR Calc  Starting 12/18/2018, serum creatinine based estimated GFR (eGFR) will be   calculated using the Chronic Kidney Disease Epidemiology Collaboration   (CKD-EPI) equation.       Calcium   Date Value Ref Range Status   11/10/2021 9.2 8.5 - 10.1 mg/dL Final   03/13/2019 9.5 8.5 - 10.1 mg/dL Final     Bilirubin Total   Date Value Ref Range Status   11/10/2021 0.5 0.2 - 1.3 mg/dL Final   03/24/2011 0.3 0.2 - 1.3 mg/dL Final     Alkaline Phosphatase   Date Value Ref Range Status   11/10/2021 168 (H) 40 - 150 U/L Final   03/24/2011 169 (H) 40 - 150 U/L Final     ALT   Date Value Ref Range Status   11/10/2021 26 0 - 70 U/L Final   03/13/2019 36 0 - 70 U/L Final     AST   Date Value Ref Range Status   11/10/2021 12 0 - 45 U/L Final   03/13/2019 21 0 - 45 U/L Final       PSA   Date Value Ref Range Status   04/15/2013 3.26 0 - 4 ug/L Final   03/07/2008 1.920 ng/mL    03/07/2008 1.920 ng/mL      Prostate Specific Antigen Screen   Date Value Ref Range Status   11/10/2021 22.40 (H) 0.00 - 4.00 ug/L Final     PSA Tumor Marker   Date Value Ref Range Status   08/11/2022 27.80 (H) 0.00 - 4.00 ug/L Final          Imaging   No new imaging to review     ASSESSMENT AND PLAN  72 year old male with intermediate risk prostate cancer with  rising PSA     We had a long discussion before about different management options and he opted for active surveillance. He is now worried about the rising PSA. He has reservation about surgery. We reviewed the perioperative course in details. He is interested in learning more about radiation.       Plan   Referral to radiation oncology       30 total minutes spent on the date of the encounter including direct interaction with the patient, performing chart review, documentation and further activities as noted above    Ami Villeda MD   Department of Urology   [facility]

## 2022-09-07 NOTE — LETTER
Date:September 8, 2022      Patient was self referred, no letter generated. Do not send.        [facility] Health Information

## 2022-09-07 NOTE — NURSING NOTE
"Chief Complaint   Patient presents with     Follow Up     Psa check       Blood pressure 129/83, pulse 112, height 1.702 m (5' 7\"), weight 81.6 kg (180 lb). Body mass index is 28.19 kg/m .    Patient Active Problem List   Diagnosis     Seizure disorder (H)     Hyperlipidemia LDL goal <130     Hyperglycemia     Advanced directives, counseling/discussion     Does not have health insurance       No Known Allergies    Current Outpatient Medications   Medication Sig Dispense Refill     lovastatin (MEVACOR) 40 MG tablet Take 1 tablet (40 mg) by mouth At Bedtime 90 tablet 3     multivitamin w/minerals (MULTI-VITAMIN) tablet Take 1 tablet by mouth daily       phenytoin (DILANTIN) 100 MG capsule Take 1 capsule (100 mg) by mouth daily 90 capsule 3       Social History     Tobacco Use     Smoking status: Never Smoker     Smokeless tobacco: Never Used   Vaping Use     Vaping Use: Never used   Substance Use Topics     Alcohol use: Yes     Comment: Once in a while have a beer     Drug use: No       Mayuri Sun  9/7/2022  1:44 PM  "

## 2022-09-19 NOTE — PROGRESS NOTES
RADIATION ONCOLOGY CONSULTATION  [facility]    DATE:  9/20/2022    PATIENT NAME: Jordan Kennedy  MEDICAL RECORD NUMBER: [id number]    REFERRING PHYSICIAN:  Dr. Villeda    REASON FOR CONSULTATION: Consideration of  definitive radiotherapy for management of adenocarcinoma of the prostate.    Staging:  Clinical  T1c N0 M0  [place]'s score:   [place]'s score: 3+4=7  PSA:     Date Value   08/11/2022 27.80   11/10/2021 22.40   04/15/2013 3.26   03/07/2008 1.920     PSA doubling time: 45.56  Months    Prostate Volume:   31 cc     NCCN :    High risk     SALOMON RISK(%): Organ Confined(OC):   52     Extra Prostattic Extension(EPE+): 34      Seminal Vesicle Involvement(SV)+:  8      Lymph Node Involvement (LN)+:  5    MAG RISK(%):  OC:    29     EPE+:   67     SV+:   9     LN+:  10      HISTORY OF PRESENT ILLNESS: The patient is a 72  year old man who was found to have an elevated PSA of 22.4(11/10/2021) compared to a prior PSA of 3.25 in 2013 on active surveillance.  The patient reported he went without obtaining a PSA from 2013 through 2021 because he wasn't aware of needing to follow and trend his PSA.    A prostate MRI(1/8/2022) showed a 31 cc prostate with a PI-RADS 5 lesion at the right>left  apex in the mid and base peripheral zone and transitional zone(8-2:00 position) with high suspicion of THA with indeterminate pelvic nodes . There was no suspicious neurovascular bundle involvement.     A TRUS/MRI fusion prostatic biopsy on 2/2/2022 of which 14 cores were taken(12 + 2 targeted PI-Rad 5 biopsy.  Pathology showed prostatic adenocarcinoma in the PI-RADS 5 lesion (right apex in the mid peripheral zone), showing [place] score 7 =3+4, grade group 2 in 2 of 2 cores.      At that point the patient wished to remain on active surveillance and send genetic testing in for a Decipher score which returned as intermediate risk (0.57).      He was followed by Dr Villeda (urology), who at a recent  appointment discussed treatment options.  The patient was not interested in surgical intervention, so he was referred to radiation oncology to discuss his treatment options.       FELISA: 1  AUA: 8        PMH:   Past Medical History:   Diagnosis Date     Prostate cancer (H)      Seizure disorder (H)        PSH:  No past surgical history on file.    CHEMOTHERAPY HISTORY: none  RADIATION HISTORY: none  HISTORY OF IBD: no  IMPLANTABLE DEVICES: none    MEDICATIONS:  Current Outpatient Medications   Medication Sig Dispense Refill     lovastatin (MEVACOR) 40 MG tablet Take 1 tablet (40 mg) by mouth At Bedtime 90 tablet 3     multivitamin w/minerals (MULTI-VITAMIN) tablet Take 1 tablet by mouth daily       phenytoin (DILANTIN) 100 MG capsule Take 1 capsule (100 mg) by mouth daily 90 capsule 3       ALLERGY:  No Known Allergies    FAMILY HISTORY:  Family History   Problem Relation Age of Onset     Cancer Mother      Diabetes Father        SOCIAL HISTORY  Social History     Tobacco Use     Smoking status: Never Smoker     Smokeless tobacco: Never Used   Substance Use Topics     Alcohol use: Yes     Comment: Once in a while have a beer        ROS: 10 point ROS reviewed as reported on the nursing assessment note.      PHYSICAL EXAMINATION:  VS: Vitals: /82   Pulse 96   Wt 81.4 kg (179 lb 8 oz)   BMI 28.11 kg/m    BMI= Body mass index is 28.11 kg/m .    Jordan Kennedy  is pleasantly conversant, euthymic in mood, breathing comfortably on room air without any audible wheeze and noncyanotic.    RADIOLOGY:  MRI OF PELVIS (1/8/22)  IMPRESSION:  1. Based on the most suspicious abnormality, this exam is  characterized as PIRADS 5 - Clinically significant cancer is highly  likely to be present.  The most suspicious abnormality is located at  the right greater than left apex, mid gland and base peripheral and  transitional zone of the prostate, 8-2:00 position and there is high  suspicion of extraprostatic  extension.    IMPRESSION:  High risk ( PSA>20) adenocarcinoma of prostate.     RECOMMENDATION: The patient has a high risk prostate cancer.  Given the risk, I believe a reasonable treatment option at this point is combined long-term hormonal therapy and radiotherapy.  There are multiple prospective studies that advocated combined hormonal therapy and radiotherapy for high risk prostate cancer.  Although surgery can be considered in organ confined prostate cancer, the patient has decided against surgery.    Given his high risk grouping, we recommend treating with 26 fx via IMRT/VMAT to the pelvis as well as 2 years of ADT.      The patient elected to proceed with radiation therapy treatment and informed consent was obtained. However, the would prefer treatments at  due to closer proximity to his residence. A referral was made to Dr. Martinez.    Patient was seen and discussed with my attending physician, Dr. Ferreira.    Carlos Bañuelos MD, MS PGY-2  Radiation Oncology      I saw the patient with the resident.  I agree with the resident's note and plan of care.      LAURA Ferreira M.D.  Department of Radiation Oncology  [facility]

## 2022-09-20 ENCOUNTER — OFFICE VISIT (OUTPATIENT)
Dept: RADIATION ONCOLOGY | Facility: CLINIC | Age: 72
End: 2022-09-20
Attending: RADIOLOGY
Payer: MEDICARE

## 2022-09-20 VITALS
HEART RATE: 96 BPM | DIASTOLIC BLOOD PRESSURE: 82 MMHG | BODY MASS INDEX: 28.11 KG/M2 | SYSTOLIC BLOOD PRESSURE: 116 MMHG | WEIGHT: 179.5 LBS

## 2022-09-20 DIAGNOSIS — C61 PROSTATE CANCER (H): Primary | ICD-10-CM

## 2022-09-20 PROCEDURE — G0463 HOSPITAL OUTPT CLINIC VISIT: HCPCS

## 2022-09-20 ASSESSMENT — ENCOUNTER SYMPTOMS
DIARRHEA: 0
CHILLS: 0
HEADACHES: 0
VOMITING: 0
CONSTIPATION: 0
FREQUENCY: 0
WEIGHT LOSS: 0
SHORTNESS OF BREATH: 0
SORE THROAT: 0
WHEEZING: 0
FALLS: 0
COUGH: 1
DEPRESSION: 0
DIZZINESS: 0
NERVOUS/ANXIOUS: 0
NAUSEA: 0
HEARTBURN: 0
FEVER: 0
NECK PAIN: 0
BACK PAIN: 0

## 2022-09-20 NOTE — PROGRESS NOTES
HPI    INITIAL PATIENT ASSESSMENT    Diagnosis: prostate cancer    Prior radiation therapy: None    Prior chemotherapy: None    Prior hormonal therapy:No    Pain Eval:  Denies    Psychosocial  Living arrangements: brother  Fall Risk: independent   referral needs:     Advanced Directive: No  Implantable Cardiac Device? No    Onset of menarche:   LMP: No LMP for male patient.  Onset of menopause:   Abnormal vaginal bleeding/discharge:   Are you pregnant?   Reproductive note: none    Nurse face-to-face time: Level 3:  10 min face to face time  Review of Systems   Constitutional: Positive for malaise/fatigue. Negative for chills, fever and weight loss.   HENT: Negative for congestion, hearing loss, sore throat and tinnitus.    Respiratory: Positive for cough. Negative for shortness of breath and wheezing.    Cardiovascular: Negative for chest pain and leg swelling.   Gastrointestinal: Negative for constipation, diarrhea, heartburn, nausea and vomiting.   Genitourinary: Negative for frequency and urgency.   Musculoskeletal: Negative for back pain, falls and neck pain.   Skin: Negative for rash.   Neurological: Negative for dizziness and headaches.   Psychiatric/Behavioral: Negative for depression. The patient is not nervous/anxious.

## 2022-09-20 NOTE — LETTER
9/20/2022         RE: Jordan Kennedy  [address] [facility] [address]        Dear Colleague,    Thank you for referring your patient, Jordan Kennedy, to the [facility] RADIATION ONCOLOGY. Please see a copy of my visit note below.    RADIATION ONCOLOGY CONSULTATION  [facility]    DATE:  9/20/2022    PATIENT NAME: Jordan Kennedy  MEDICAL RECORD NUMBER: [id number]    REFERRING PHYSICIAN:  Dr. Villeda    REASON FOR CONSULTATION: Consideration of  definitive radiotherapy for management of adenocarcinoma of the prostate.    Staging:  Clinical  T1c N0 M0  Ensenada's score:   Ensenada's score: 3+4=7  PSA:     Date Value   08/11/2022 27.80   11/10/2021 22.40   04/15/2013 3.26   03/07/2008 1.920     PSA doubling time: 45.56  Months    Prostate Volume:   31 cc     NCCN :    High risk     SALOMON RISK(%): Organ Confined(OC):   52     Extra Prostattic Extension(EPE+): 34      Seminal Vesicle Involvement(SV)+:  8      Lymph Node Involvement (LN)+:  5    MAG RISK(%):  OC:    29     EPE+:   67     SV+:   9     LN+:  10      HISTORY OF PRESENT ILLNESS: The patient is a 72  year old man who was found to have an elevated PSA of 22.4(11/10/2021) compared to a prior PSA of 3.25 in 2013 on active surveillance.  The patient reported he went without obtaining a PSA from 2013 through 2021 because he wasn't aware of needing to follow and trend his PSA.    A prostate MRI(1/8/2022) showed a 31 cc prostate with a PI-RADS 5 lesion at the right>left  apex in the mid and base peripheral zone and transitional zone(8-2:00 position) with high suspicion of THA with indeterminate pelvic nodes . There was no suspicious neurovascular bundle involvement.     A TRUS/MRI fusion prostatic biopsy on 2/2/2022 of which 14 cores were taken(12 + 2 targeted PI-Rad 5 biopsy.  Pathology showed prostatic adenocarcinoma in the PI-RADS 5 lesion (right apex in the mid peripheral zone), showing [place] score  7 =3+4, grade group 2 in 2 of 2 cores.      At that point the patient wished to remain on active surveillance and send genetic testing in for a Decipher score which returned as intermediate risk (0.57).      He was followed by Dr Villeda (urology), who at a recent appointment discussed treatment options.  The patient was not interested in surgical intervention, so he was referred to radiation oncology to discuss his treatment options.       FELISA: 1  AUA: 8        PMH:   Past Medical History:   Diagnosis Date     Prostate cancer (H)      Seizure disorder (H)        PSH:  No past surgical history on file.    CHEMOTHERAPY HISTORY: none  RADIATION HISTORY: none  HISTORY OF IBD: no  IMPLANTABLE DEVICES: none    MEDICATIONS:  Current Outpatient Medications   Medication Sig Dispense Refill     lovastatin (MEVACOR) 40 MG tablet Take 1 tablet (40 mg) by mouth At Bedtime 90 tablet 3     multivitamin w/minerals (MULTI-VITAMIN) tablet Take 1 tablet by mouth daily       phenytoin (DILANTIN) 100 MG capsule Take 1 capsule (100 mg) by mouth daily 90 capsule 3       ALLERGY:  No Known Allergies    FAMILY HISTORY:  Family History   Problem Relation Age of Onset     Cancer Mother      Diabetes Father        SOCIAL HISTORY  Social History     Tobacco Use     Smoking status: Never Smoker     Smokeless tobacco: Never Used   Substance Use Topics     Alcohol use: Yes     Comment: Once in a while have a beer        ROS: 10 point ROS reviewed as reported on the nursing assessment note.      PHYSICAL EXAMINATION:  VS: Vitals: /82   Pulse 96   Wt 81.4 kg (179 lb 8 oz)   BMI 28.11 kg/m    BMI= Body mass index is 28.11 kg/m .    Jordan Kennedy  is pleasantly conversant, euthymic in mood, breathing comfortably on room air without any audible wheeze and noncyanotic.    RADIOLOGY:  MRI OF PELVIS (1/8/22)  IMPRESSION:  1. Based on the most suspicious abnormality, this exam is  characterized as PIRADS 5 - Clinically significant cancer is  highly  likely to be present.  The most suspicious abnormality is located at  the right greater than left apex, mid gland and base peripheral and  transitional zone of the prostate, 8-2:00 position and there is high  suspicion of extraprostatic extension.    IMPRESSION:  High risk ( PSA>20) adenocarcinoma of prostate.     RECOMMENDATION: The patient has a high risk prostate cancer.  Given the risk, I believe a reasonable treatment option at this point is combined long-term hormonal therapy and radiotherapy.  There are multiple prospective studies that advocated combined hormonal therapy and radiotherapy for high risk prostate cancer.  Although surgery can be considered in organ confined prostate cancer, the patient has decided against surgery.    Given his high risk grouping, we recommend treating with 26 fx via IMRT/VMAT to the pelvis as well as 2 years of ADT.      The patient elected to proceed with radiation therapy treatment and informed consent was obtained. However, the would prefer treatments at  due to closer proximity to his residence. A referral was made to Dr. Martinez.    Patient was seen and discussed with my attending physician, Dr. Ferreira.    Carlos Bañuelos MD, MS PGY-2  Radiation Oncology      I saw the patient with the resident.  I agree with the resident's note and plan of care.      LAURA Ferreira M.D.  Department of Radiation Oncology  [facility]      HPI    INITIAL PATIENT ASSESSMENT    Diagnosis: prostate cancer    Prior radiation therapy: None    Prior chemotherapy: None    Prior hormonal therapy:No    Pain Eval:  Denies    Psychosocial  Living arrangements: brother  Fall Risk: independent   referral needs:     Advanced Directive: No  Implantable Cardiac Device? No    Onset of menarche:   LMP: No LMP for male patient.  Onset of menopause:   Abnormal vaginal bleeding/discharge:   Are you pregnant?   Reproductive note: none    Nurse face-to-face time:  Level 3:  10 min face to face time  Review of Systems   Constitutional: Positive for malaise/fatigue. Negative for chills, fever and weight loss.   HENT: Negative for congestion, hearing loss, sore throat and tinnitus.    Respiratory: Positive for cough. Negative for shortness of breath and wheezing.    Cardiovascular: Negative for chest pain and leg swelling.   Gastrointestinal: Negative for constipation, diarrhea, heartburn, nausea and vomiting.   Genitourinary: Negative for frequency and urgency.   Musculoskeletal: Negative for back pain, falls and neck pain.   Skin: Negative for rash.   Neurological: Negative for dizziness and headaches.   Psychiatric/Behavioral: Negative for depression. The patient is not nervous/anxious.          Again, thank you for allowing me to participate in the care of your patient.        Sincerely,        Ron Ferreira MD

## 2022-09-21 ENCOUNTER — PATIENT OUTREACH (OUTPATIENT)
Dept: RADIATION ONCOLOGY | Facility: CLINIC | Age: 72
End: 2022-09-21

## 2022-09-21 NOTE — PROGRESS NOTES
Patient see by Dr. Ferreira at [facility] for radiation therapy consult, patient preference for treatment at [facility] per Dr. Ferreira.  This RN attempted contact with patient, no answer, voice message left requesting return call to this RN at [phone].    Cora Interiano, RN BSN OCN CBCN

## 2022-09-23 NOTE — PROGRESS NOTES
Received return call from patient's brother Vika who reports he does the scheduling of appointments for his brother.  Offered first available with Dr. Martinez on 10/4/2022, unable to work with brother's schedule.  Accepted appointment on Thursday, 10/6/2022 at 1330 at [facility] with Dr. Teja Martinez.  Vika confirmed all appointment details and had no questions at this time.    Cora Interiano, RN BSN OCN CBCN

## 2022-10-06 ENCOUNTER — OFFICE VISIT (OUTPATIENT)
Dept: RADIATION ONCOLOGY | Facility: CLINIC | Age: 72
End: 2022-10-06
Payer: MEDICARE

## 2022-10-06 VITALS
SYSTOLIC BLOOD PRESSURE: 130 MMHG | HEART RATE: 94 BPM | DIASTOLIC BLOOD PRESSURE: 85 MMHG | OXYGEN SATURATION: 94 % | TEMPERATURE: 97.6 F | WEIGHT: 182 LBS | RESPIRATION RATE: 18 BRPM | BODY MASS INDEX: 28.51 KG/M2

## 2022-10-06 DIAGNOSIS — C61 PROSTATE CANCER (H): Primary | ICD-10-CM

## 2022-10-06 PROCEDURE — 99215 OFFICE O/P EST HI 40 MIN: CPT | Performed by: SURGERY

## 2022-10-06 ASSESSMENT — PAIN SCALES - GENERAL: PAINLEVEL: NO PAIN (0)

## 2022-10-06 NOTE — PATIENT INSTRUCTIONS
Please contact [facility] RN with questions or concerns following today's appointment: [phone].    Thank you!

## 2022-10-06 NOTE — PROGRESS NOTES
Department of Radiation Oncology  [facility]: Cancer Center  [facility]  [address] [address] [place] [address]  [phone]       Consultation Note    Name: Jordan Kennedy MRN: [id number]   : 1950   Date of Service: 10/6/2022  Referring: Dr. Ferreira     Reason for consultation: high risk prostate cancer    History of Present Illness   Mr. Kennedy is a 72 year old male with high risk prostate cancer, cT1c, PSA 27.4, GS 3+4=7.    Briefly, his oncologic history is as follows:    The patient was initially referred to urology following an elevated PSA of 22.4 on 11/10/21. GEORGE was unremarkable.    He was referred to Dr. Villeda for further evaluation.  MRI was obtained on 2022 demonstrating a PI-RADS 5 abnormality in the right greater than left apex and mid gland measuring up to 3.4 cm in size, with possible suspicion of extraprostatic extension.  There are indeterminate pelvic lymph nodes, with no convincing bone lesions, and no evidence of seminal vesicle invasion.  Prostate volume was 31 cc.  Biopsy was performed on 2022 with pathology returning as 3+4 = 7 in 2/2 cores in the targeted lesion, with remainder of the cores negative for malignancy.  At that time patient elected no further treatment.     PSA was repeated on a 2022 now elevated to 27.4.  Given the elevation, patient was more interested in treatment options, and discussed the possibility of surgery, the patient was hesitant to pursue surgical options.  That she was referred to Dr. Ferreira for discussion of radiation therapy options.  He was recommended RT to pelvis along with long term hormone, given high risk prostate cancer.He was referred to [place] given that he lives closer to this location.    The patient presents today to discuss the potential role of radiation therapy in treatment of his prostate cancer.    Symptoms:  Utility score: 80  Continence: 0 pads used per  day with a bother score of 1  Potency/erectile function score: 4 with a bother score of1  IPSS score: 8/35  Quality of life due to urinary symptoms: 0  FELISA score: N/A    PSA Levels:    Date  PSA      PSA   Date Value Ref Range Status   04/15/2013 3.26 0 - 4 ug/L Final   03/07/2008 1.920 ng/mL    03/07/2008 1.920 ng/mL      Prostate Specific Antigen Screen   Date Value Ref Range Status   11/10/2021 22.40 (H) 0.00 - 4.00 ug/L Final     PSA Tumor Marker   Date Value Ref Range Status   08/11/2022 27.80 (H) 0.00 - 4.00 ug/L Final       PSA DT: 45 months    PSA Density: 0.88    Life Expectancy: 12.59 years    Sujit Risks: Organ confined 52%, extracapsular extension 34%, seminal vesicle invasion 8%, lymph node involvement 5%        Past Medical History:   Past Medical History:   Diagnosis Date     Prostate cancer (H) 02/02/2022     Seizure disorder (H)        Past Surgical History:   Past Surgical History:   Procedure Laterality Date     PROSTATE BIOPSY  02/02/2022       Chemotherapy History:  No prior chemotherapy    Radiation History:  No prior radiation  Pregnant: Not Applicable  Implanted Cardiac Devices: No    Medications:  Current Outpatient Medications   Medication     lovastatin (MEVACOR) 40 MG tablet     multivitamin w/minerals (MULTI-VITAMIN) tablet     phenytoin (DILANTIN) 100 MG capsule     No current facility-administered medications for this visit.         Allergies:   No Known Allergies    Social History:  Tobacco: Non-smoker  Alcohol: No alcohol  Employment: Retired      Family History:  Family History   Problem Relation Age of Onset     Breast Cancer Mother      Prostate Cancer Father        Review of Systems   A 10-point review of systems was performed. Pertinent findings are noted in the HPI.    Physical Exam   ECOG Status: 2    Vitals:  /85 (BP Location: Left arm, Patient Position: Chair, Cuff Size: Adult Regular)   Pulse 94   Temp 97.6  F (36.4  C) (Oral)   Resp 18   Wt 82.6 kg  (182 lb)   SpO2 94%   BMI 28.51 kg/m      Gen: Alert, in NAD  Head: NC/AT  Eyes: PERRL, EOMI, sclera anicteric  Ears: No external auricular lesions  Nose/sinus: No rhinorrhea or epistaxis  Oral cavity/oropharynx: MMM, no visible oral cavity lesions  Neck: Full ROM, supple, no palpable adenopathy  Pulm: No wheezing, stridor or respiratory distress  CV: Extremities are warm and well-perfused, no cyanosis, no pedal edema  Abdominal: Normal bowel sounds, soft, nontender, no masses  : deferred   Musculoskeletal: Normal bulk and tone  Skin: Normal color and turgor  Neuro: A/Ox3, CN II-XII intact, normal gait    Imaging/Path/Labs   Imaging: per HPI, personally reviewed and in agreement     Path: per HPI, personally reviewed and in agreement     Labs: per HPI, personally reviewed and in agreement     Assessment      Mr. Kennedy is a 72 year old male with high risk prostate cancer, cT1c, PSA 27.4, GS 3+4=7.    We discussed the management of high/very-high risk prostate cancer per NCCN guidelines.  In patients with a life expectancy greater than 5 years, options include external beam with LT-ADT (1.5-3 years) or radical prostatectomy with pelvic lymph node dissection. In patient with life expectancy less than 5 years, options include observation, or AT, or EBRT alone.    We discussed options of radiation particularly external beam radiation.  We discussed treating patient with radiation alone with a hypofractionated approach (preferred) with 70.2 Gy/26 fractions.  Although his lymph node risk is is 5% on Sujit Tables, he does haves some indeterminate lymph nodes. Thus, likely  target would  be the prostate gland and proximal seminal vesicles along with treating the lymph nodes.  Given that patient is high risk, LT-ADT (1.5-3 years) is recommended. Further, we discussed that in general ADT offers a disease-free survival and overall survival benefit in intermediate unfavorable and high risk prostate cancer patients (EORTC  40335 Trial, DART Trial). We discussed the a potential added cardiovascular risk with ADT, potentially greatest in older patients and/or in patients significant pre-existing cardiovascular risks (D'Amico et al. JCO 2007). However, this remains controversial and there is data to support no increased cardiovascular death risk with ADT (Ellison et al., JOSEPH 2011). Thus, the use of ADT must be tailored to individual patient's comorbidities.    We also discussed the general side effects and differences between radiation and surgery:     Side Effects of Radiation Therapy   The main side effects of radiation therapy for prostate cancer are irritation/injury to the rectum/bladder and erectile dysfunction.  Radiation side effects, similar to surgery, are age dependent.  Furthermore, radiation side effects are dependent on the area being treated, the total dose of radiation, and whether or not additional treatment (hormone therapy, previous surgery, etc.) is/was given.   Side effects of external beam radiation may include diarrhea and colitis (irritated intestines).  Occasionally, normal bowel function does not return after treatment is stopped.  Both during and after treatment, other side effects may include frequent urination, urge incontinence (feeling like you have to urinate all the time), burning sensation while urinating, and blood in the urine.  Less than 5% of men report problems with urinary incontinence, but this may increase over time as the effects of radiation can increase.   Radiation therapy may also cause a feeling of fatigue, which may persist for a few months after treatment stops.  About 30% to 60% of men who receive external beam radiation develop impotence.  Impotence usually does not occur right after radiation therapy but gradually develops over one or more years.  Eventually, the rate of impotence may equal that of surgery (Kaela PROST-QA, NEJM 2008; Elmo, ProtecT Trial QOL, NEJM 2016).  Side  Effects of Prostate Surgery   The main side effects of radical prostatectomy are incontinence and impotence and are both age dependent as well as type of surgery performed.   Normal bladder control usually returns within several weeks or months after radical prostatectomy.  Passing a small amount of urine, when coughing, laughing, sneezing, or exercising, may persist permanently after prostatectomy in up to 35% of men.  Some patients (between 2% and 5%) have more serious stress incontinence, which may be permanent.   During the first 3 to 12 months after radical prostatectomy, most men will have erectile dysfunction and will need to use medicines or other treatments if they wish to have an erection.  The effect of this operation on a man's ability to achieve an erection is related to the patient's age and whether nerve-sparing surgery was done.  Nearly all men who have a radical prostatectomy should expect some permanent decrease in their ability to have an erection, but younger men may expect to retain more of their ability.  If the surgeon does not remove the nerves on either side of the prostate during prostatectomy, the impotence rate is between 25% and 30% for men under 60.  But it occurs in 70% to 80% of men over 70, even if nerves on both sides are not removed.  By contrast, after standard radical prostatectomy (in which the nerves are removed), almost all men will become impotent, depending on their age.  Side effects are reported less often among men treated at major cancer centers, where this type of surgery is performed on a more routine basis.  Specific questions/concerns were referred back to the urology oncology team to address further (Kaela PROST-QA, [facility] 2008; Elmo, ProtecT Trial QOL, [facility] 2016).    Plan     1. In patients with high risk prostate cancer, soft tissue imaging and bone imaging is usually recommended.  He has had a pelvic MRI which demonstrated indeterminate lymph nodes. Thus, we  will complete work up with PET/PSMA scan for metastatic disease. This will also help clarify lymph node status. We will arrange follow up to discus results of PET PSMA scan.    2.  If negative for any regional or distant disease, we would likely recommend  70.2Gy/26fx with lymph nodes. Plan for ADT for 1.5- 3 years. Fiducial placement and Space OAR placement 1-2 weeks prior to CT simulation.       All benefits and risks discussed, and patient is in agreement with the oncologic plan discussed above.     Thank you for allowing me to participate in your patient's care.  If you should require any additional information, please do not hesitate in contacting me.     Teja Martinez MD  Department of Radiation Oncology  [facility]

## 2022-10-06 NOTE — LETTER
10/6/2022         RE: Jordan Kennedy   [address] [facility] [address]        Dear Colleague,    Thank you for referring your patient, Jordan Kennedy, to the [facility] RADIATION [facility]COL[facility] [place]. Please see a copy of my visit note below.       Department of Radiation Oncology  [facility]: Cancer Center  [facility]  [address] [address] [place] [id number]  [phone]       Consultation Note    Name: Jordan Kennedy MRN: [id number]   : 1950   Date of Service: 10/6/2022  Referring: Dr. Ferreira     Reason for consultation: high risk prostate cancer    History of Present Illness   Mr. Kennedy is a 72 year old male with high risk prostate cancer, cT1c, PSA 27.4, GS 3+4=7.    Briefly, his oncologic history is as follows:    The patient was initially referred to urology following an elevated PSA of 22.4 on 11/10/21. GEORGE was unremarkable.    He was referred to Dr. Villeda for further evaluation.  MRI was obtained on 2022 demonstrating a PI-RADS 5 abnormality in the right greater than left apex and mid gland measuring up to 3.4 cm in size, with possible suspicion of extraprostatic extension.  There are indeterminate pelvic lymph nodes, with no convincing bone lesions, and no evidence of seminal vesicle invasion.  Prostate volume was 31 cc.  Biopsy was performed on 2022 with pathology returning as 3+4 = 7 in 2/2 cores in the targeted lesion, with remainder of the cores negative for malignancy.  At that time patient elected no further treatment.     PSA was repeated on a 2022 now elevated to 27.4.  Given the elevation, patient was more interested in treatment options, and discussed the possibility of surgery, the patient was hesitant to pursue surgical options.  That she was referred to Dr. Ferreira for discussion of radiation therapy options.  He was recommended RT to pelvis along with long term hormone, given high  risk prostate cancer.He was referred to [place] given that he lives closer to this location.    The patient presents today to discuss the potential role of radiation therapy in treatment of his prostate cancer.    Symptoms:  Utility score: 80  Continence: 0 pads used per day with a bother score of 1  Potency/erectile function score: 4 with a bother score of1  IPSS score: 8/35  Quality of life due to urinary symptoms: 0  FELISA score: N/A    PSA Levels:    Date  PSA      PSA   Date Value Ref Range Status   04/15/2013 3.26 0 - 4 ug/L Final   03/07/2008 1.920 ng/mL    03/07/2008 1.920 ng/mL      Prostate Specific Antigen Screen   Date Value Ref Range Status   11/10/2021 22.40 (H) 0.00 - 4.00 ug/L Final     PSA Tumor Marker   Date Value Ref Range Status   08/11/2022 27.80 (H) 0.00 - 4.00 ug/L Final       PSA DT: 45 months    PSA Density: 0.88    Life Expectancy: 12.59 years    Sujit Risks: Organ confined 52%, extracapsular extension 34%, seminal vesicle invasion 8%, lymph node involvement 5%        Past Medical History:   Past Medical History:   Diagnosis Date     Prostate cancer (H) 02/02/2022     Seizure disorder (H)        Past Surgical History:   Past Surgical History:   Procedure Laterality Date     PROSTATE BIOPSY  02/02/2022       Chemotherapy History:  No prior chemotherapy    Radiation History:  No prior radiation  Pregnant: Not Applicable  Implanted Cardiac Devices: No    Medications:  Current Outpatient Medications   Medication     lovastatin (MEVACOR) 40 MG tablet     multivitamin w/minerals (MULTI-VITAMIN) tablet     phenytoin (DILANTIN) 100 MG capsule     No current facility-administered medications for this visit.         Allergies:   No Known Allergies    Social History:  Tobacco: Non-smoker  Alcohol: No alcohol  Employment: Retired      Family History:  Family History   Problem Relation Age of Onset     Breast Cancer Mother      Prostate Cancer Father        Review of Systems   A  10-point review of systems was performed. Pertinent findings are noted in the HPI.    Physical Exam   ECOG Status: 2    Vitals:  /85 (BP Location: Left arm, Patient Position: Chair, Cuff Size: Adult Regular)   Pulse 94   Temp 97.6  F (36.4  C) (Oral)   Resp 18   Wt 82.6 kg (182 lb)   SpO2 94%   BMI 28.51 kg/m      Gen: Alert, in NAD  Head: NC/AT  Eyes: PERRL, EOMI, sclera anicteric  Ears: No external auricular lesions  Nose/sinus: No rhinorrhea or epistaxis  Oral cavity/oropharynx: MMM, no visible oral cavity lesions  Neck: Full ROM, supple, no palpable adenopathy  Pulm: No wheezing, stridor or respiratory distress  CV: Extremities are warm and well-perfused, no cyanosis, no pedal edema  Abdominal: Normal bowel sounds, soft, nontender, no masses  : deferred   Musculoskeletal: Normal bulk and tone  Skin: Normal color and turgor  Neuro: A/Ox3, CN II-XII intact, normal gait    Imaging/Path/Labs   Imaging: per HPI, personally reviewed and in agreement     Path: per HPI, personally reviewed and in agreement     Labs: per HPI, personally reviewed and in agreement     Assessment      Mr. Kennedy is a 72 year old male with high risk prostate cancer, cT1c, PSA 27.4, GS 3+4=7.    We discussed the management of high/very-high risk prostate cancer per NCCN guidelines.  In patients with a life expectancy greater than 5 years, options include external beam with LT-ADT (1.5-3 years) or radical prostatectomy with pelvic lymph node dissection. In patient with life expectancy less than 5 years, options include observation, or AT, or EBRT alone.    We discussed options of radiation particularly external beam radiation.  We discussed treating patient with radiation alone with a hypofractionated approach (preferred) with 70.2 Gy/26 fractions.  Although his lymph node risk is is 5% on Sujit Tables, he does haves some indeterminate lymph nodes. Thus, likely  target would  be the prostate gland and proximal seminal vesicles  along with treating the lymph nodes.  Given that patient is high risk, LT-ADT (1.5-3 years) is recommended. Further, we discussed that in general ADT offers a disease-free survival and overall survival benefit in intermediate unfavorable and high risk prostate cancer patients (EORTC 66973 Trial, DART Trial). We discussed the a potential added cardiovascular risk with ADT, potentially greatest in older patients and/or in patients significant pre-existing cardiovascular risks (SARWAT'Amico et al. JCO 2007). However, this remains controversial and there is data to support no increased cardiovascular death risk with ADT (Ellison et al., JOSEPH 2011). Thus, the use of ADT must be tailored to individual patient's comorbidities.    We also discussed the general side effects and differences between radiation and surgery:     Side Effects of Radiation Therapy   The main side effects of radiation therapy for prostate cancer are irritation/injury to the rectum/bladder and erectile dysfunction.  Radiation side effects, similar to surgery, are age dependent.  Furthermore, radiation side effects are dependent on the area being treated, the total dose of radiation, and whether or not additional treatment (hormone therapy, previous surgery, etc.) is/was given.   Side effects of external beam radiation may include diarrhea and colitis (irritated intestines).  Occasionally, normal bowel function does not return after treatment is stopped.  Both during and after treatment, other side effects may include frequent urination, urge incontinence (feeling like you have to urinate all the time), burning sensation while urinating, and blood in the urine.  Less than 5% of men report problems with urinary incontinence, but this may increase over time as the effects of radiation can increase.   Radiation therapy may also cause a feeling of fatigue, which may persist for a few months after treatment stops.  About 30% to 60% of men who receive external  beam radiation develop impotence.  Impotence usually does not occur right after radiation therapy but gradually develops over one or more years.  Eventually, the rate of impotence may equal that of surgery (Kaela PROST-QA, [facility] 2008; Elmo, ProtecT Trial QOL, [facility] 2016).  Side Effects of Prostate Surgery   The main side effects of radical prostatectomy are incontinence and impotence and are both age dependent as well as type of surgery performed.   Normal bladder control usually returns within several weeks or months after radical prostatectomy.  Passing a small amount of urine, when coughing, laughing, sneezing, or exercising, may persist permanently after prostatectomy in up to 35% of men.  Some patients (between 2% and 5%) have more serious stress incontinence, which may be permanent.   During the first 3 to 12 months after radical prostatectomy, most men will have erectile dysfunction and will need to use medicines or other treatments if they wish to have an erection.  The effect of this operation on a man's ability to achieve an erection is related to the patient's age and whether nerve-sparing surgery was done.  Nearly all men who have a radical prostatectomy should expect some permanent decrease in their ability to have an erection, but younger men may expect to retain more of their ability.  If the surgeon does not remove the nerves on either side of the prostate during prostatectomy, the impotence rate is between 25% and 30% for men under 60.  But it occurs in 70% to 80% of men over 70, even if nerves on both sides are not removed.  By contrast, after standard radical prostatectomy (in which the nerves are removed), almost all men will become impotent, depending on their age.  Side effects are reported less often among men treated at [facility] cancer centers, where this type of surgery is performed on a more routine basis.  Specific questions/concerns were referred back to the urology oncology team to address  further (Kaela PROST-QA, [facility] 2008; Elmo, ProtecT Trial QOL, [facility] 2016).    Plan     1. In patients with high risk prostate cancer, soft tissue imaging and bone imaging is usually recommended.  He has had a pelvic MRI which demonstrated indeterminate lymph nodes. Thus, we will complete work up with PET/PSMA scan for metastatic disease. This will also help clarify lymph node status. We will arrange follow up to discus results of PET PSMA scan.    2.  If negative for any regional or distant disease, we would likely recommend  70.2Gy/26fx with lymph nodes. Plan for ADT for 1.5- 3 years. Fiducial placement and Space OAR placement 1-2 weeks prior to CT simulation.       All benefits and risks discussed, and patient is in agreement with the oncologic plan discussed above.     Thank you for allowing me to participate in your patient's care.  If you should require any additional information, please do not hesitate in contacting me.     Teja Martinez MD  Department of Radiation Oncology  [facility]         Again, thank you for allowing me to participate in the care of your patient.        Sincerely,        Teja Martinez MD

## 2022-10-06 NOTE — NURSING NOTE
INITIAL PATIENT ASSESSMENT      Diagnosis: prostate cancer    Prior radiation therapy: None    Prior chemotherapy: None    Prior hormonal therapy:No    Pain Eval:  Denies    Psychosocial  Living arrangements: lives at home in [place], lives with his brother Mook, also presents to clinic with his brother Vika.  Fall Risk: independent  [facility] [place] Screening Completed: Yes Result: Negative   referral needs: Not needed    Advanced Directive: No - information packet sent via [employer] per patient request  Implantable Cardiac Device: No  Authorization To Share Protected Health Information: YES - Date: 10/6/2022      Reproductive note: not recorded for male patient.      Review of Systems     Constitutional: Negative.    HENT: Negative.    Eyes: Negative.    Respiratory: Negative.    Cardiovascular: Negative.    Gastrointestinal: Negative.    Genitourinary: Negative.    Musculoskeletal: Negative.    Skin: Negative.    Neurological: Negative.    Endo/Heme/Allergies: Negative.    Psychiatric/Behavioral: Negative.      Nurse face-to-face time: Level 5:  over 15 min face to face time.    Cora Interiano RN BSN OCN CBCN

## 2022-10-18 ENCOUNTER — PATIENT OUTREACH (OUTPATIENT)
Dept: RADIATION ONCOLOGY | Facility: CLINIC | Age: 72
End: 2022-10-18

## 2022-10-18 NOTE — TELEPHONE ENCOUNTER
Patient last seen by Dr. Martinez on 10/6/2022 with recommendations for PET PSMA followed by return visit with Dr. Martinez.    Patient scheduled for PET PSMA on Wednesday, 10/26/2022.  This RN contacted patient's brother Vika Sandy verbalized appointment details and prep instructions for PET PSMA scan.    Reviewed return visit with Dr. Martinez and scheduled on Friday, 10/28/2022 at 1330 at [facility].  Vika verbalized understanding of all information and had no questions at this time.    Dr. Martinez updated via Epic in-basket.    Cora Interiano, RN BSN OCN CBCN

## 2022-10-26 ENCOUNTER — HOSPITAL ENCOUNTER (OUTPATIENT)
Dept: PET IMAGING | Facility: CLINIC | Age: 72
Setting detail: NUCLEAR MEDICINE
Discharge: HOME | End: 2022-10-26
Attending: SURGERY | Admitting: SURGERY
Payer: MEDICARE

## 2022-10-26 DIAGNOSIS — C61 PROSTATE CANCER (H): ICD-10-CM

## 2022-10-26 PROCEDURE — 343N000001 HC RX 343: Performed by: SURGERY

## 2022-10-26 PROCEDURE — 78815 PET IMAGE W/CT SKULL-THIGH: CPT | Mod: 26 | Performed by: RADIOLOGY

## 2022-10-26 PROCEDURE — G1010 CDSM STANSON: HCPCS | Mod: GC | Performed by: RADIOLOGY

## 2022-10-26 PROCEDURE — G1010 CDSM STANSON: HCPCS | Mod: PS

## 2022-10-26 PROCEDURE — A9800 HC RX 343: HCPCS | Performed by: SURGERY

## 2022-10-26 RX ADMIN — KIT FOR THE PREPARATION OF GALLIUM GA 68 GOZETOTIDE 5.26 MILLICURIE: 25 INJECTION, POWDER, LYOPHILIZED, FOR SOLUTION INTRAVENOUS at 12:17

## 2022-10-28 ENCOUNTER — OFFICE VISIT (OUTPATIENT)
Dept: RADIATION ONCOLOGY | Facility: CLINIC | Age: 72
End: 2022-10-28
Payer: MEDICARE

## 2022-10-28 VITALS
WEIGHT: 182 LBS | OXYGEN SATURATION: 99 % | HEART RATE: 108 BPM | BODY MASS INDEX: 28.51 KG/M2 | TEMPERATURE: 97.7 F | SYSTOLIC BLOOD PRESSURE: 136 MMHG | RESPIRATION RATE: 18 BRPM | DIASTOLIC BLOOD PRESSURE: 80 MMHG

## 2022-10-28 DIAGNOSIS — C61 PROSTATE CANCER (H): Primary | ICD-10-CM

## 2022-10-28 PROCEDURE — 99215 OFFICE O/P EST HI 40 MIN: CPT | Performed by: SURGERY

## 2022-10-28 ASSESSMENT — PAIN SCALES - GENERAL: PAINLEVEL: NO PAIN (0)

## 2022-10-28 NOTE — LETTER
10/28/2022         RE: Jordan Kennedy   [address] [facility] [address]        Dear Colleague,    Thank you for referring your patient, Jordan Kennedy, to the [facility] RADIATION ONCOLOGY [place]. Please see a copy of my visit note below.       Department of Radiation Oncology  [facility]  [facility]  [address] [address] [place] [address]  [phone]       Consultation Note    Name: Jordan Kennedy MRN: [id number]   : 1950   Date of Service: 10/28/22 Referring: Dr. Ferreira     Reason for consultation: high risk prostate cancer    History of Present Illness   Mr. Kennedy is a 72 year old male with high risk prostate cancer, cT1c, PSA 27.4, GS 3+4=7.    Briefly, his oncologic history is as follows:    The patient was initially referred to urology following an elevated PSA of 22.4 on 11/10/21. GEORGE was unremarkable.    He was referred to Dr. Villeda for further evaluation.  MRI was obtained on 2022 demonstrating a PI-RADS 5 abnormality in the right greater than left apex and mid gland measuring up to 3.4 cm in size, with possible suspicion of extraprostatic extension.  There are indeterminate pelvic lymph nodes, with no convincing bone lesions, and no evidence of seminal vesicle invasion.  Prostate volume was 31 cc.  Biopsy was performed on 2022 with pathology returning as 3+4 = 7 in 2/2 cores in the targeted lesion, with remainder of the cores negative for malignancy.  At that time patient elected no further treatment.     PSA was repeated on a 2022 now elevated to 27.4.  Given the elevation, patient was more interested in treatment options, and discussed the possibility of surgery, the patient was hesitant to pursue surgical options.  That she was referred to Dr. Ferreira for discussion of radiation therapy options.  He was recommended RT to pelvis along with long term hormone, given high  risk prostate cancer.[facility] was [facility] given that he lives closer to this location.    The patient presents today to discuss the potential role of radiation therapy in treatment of his prostate cancer.    Interval history:    PET PSMA 10/26/22 was negative for any regional or distant disease. No further changes GI/ symptoms.    Symptoms:  Utility score: 80  Continence: 0 pads used per day with a bother score of 1  Potency/erectile function score: 4 with a bother score of1  IPSS score: 8/35  Quality of life due to urinary symptoms: 0  FELISA score: N/A    PSA Levels:    Date  PSA      PSA   Date Value Ref Range Status   04/15/2013 3.26 0 - 4 ug/L Final   03/07/2008 1.920 ng/mL    03/07/2008 1.920 ng/mL      Prostate Specific Antigen Screen   Date Value Ref Range Status   11/10/2021 22.40 (H) 0.00 - 4.00 ug/L Final     PSA Tumor Marker   Date Value Ref Range Status   08/11/2022 27.80 (H) 0.00 - 4.00 ug/L Final       PSA DT: 45 months    PSA Density: 0.88    Life Expectancy: 12.59 years    Sujit Risks: Organ confined 52%, extracapsular extension 34%, seminal vesicle invasion 8%, lymph node involvement 5%        Past Medical History:   Past Medical History:   Diagnosis Date     Prostate cancer (H) 02/02/2022     Seizure disorder (H)        Past Surgical History:   Past Surgical History:   Procedure Laterality Date     PROSTATE BIOPSY  02/02/2022       Chemotherapy History:  No prior chemotherapy    Radiation History:  No prior radiation  Pregnant: Not Applicable  Implanted Cardiac Devices: No    Medications:  Current Outpatient Medications   Medication     lovastatin (MEVACOR) 40 MG tablet     multivitamin w/minerals (MULTI-VITAMIN) tablet     phenytoin (DILANTIN) 100 MG capsule     No current facility-administered medications for this visit.         Allergies:   No Known Allergies    Social History:  Tobacco: Non-smoker  Alcohol: No alcohol  Employment: Retired      Family History:  Family  History   Problem Relation Age of Onset     Breast Cancer Mother      Prostate Cancer Father        Review of Systems   A 10-point review of systems was performed. Pertinent findings are noted in the HPI.    Physical Exam   ECOG Status: 2    Vitals:  /80 (BP Location: Left arm, Patient Position: Chair, Cuff Size: Adult Regular)   Pulse 108   Temp 97.7  F (36.5  C) (Oral)   Resp 18   Wt 82.6 kg (182 lb)   SpO2 99%   BMI 28.51 kg/m      Gen: Alert, in NAD  Head: NC/AT  Eyes: PERRL, EOMI, sclera anicteric  Ears: No external auricular lesions  Nose/sinus: No rhinorrhea or epistaxis  Oral cavity/oropharynx: MMM, no visible oral cavity lesions  Neck: Full ROM, supple, no palpable adenopathy  Pulm: No wheezing, stridor or respiratory distress  CV: Extremities are warm and well-perfused, no cyanosis, no pedal edema  Abdominal: Normal bowel sounds, soft, nontender, no masses  : deferred   Musculoskeletal: Normal bulk and tone  Skin: Normal color and turgor  Neuro: A/Ox3, CN II-XII intact, normal gait    Imaging/Path/Labs   Imaging: per HPI, personally reviewed and in agreement     Path: per HPI, personally reviewed and in agreement     Labs: per HPI, personally reviewed and in agreement     Assessment      Mr. Kennedy is a 72 year old male with high risk prostate cancer, cT1c, PSA 27.4, GS 3+4=7. PET/PSMA negative for regional or distant disease.    We discussed the management of high/very-high risk prostate cancer per NCCN guidelines.  In patients with a life expectancy greater than 5 years, options include external beam with LT-ADT (1.5-3 years) or radical prostatectomy with pelvic lymph node dissection. In patient with life expectancy less than 5 years, options include observation, or AT, or EBRT alone.    We discussed options of radiation particularly external beam radiation.  We discussed treating patient with radiation alone with a hypofractionated approach (preferred) with 70.2 Gy/26 fractions.   Although his lymph node risk is is 5% on Sujit Tables, he does haves some indeterminate lymph nodes. Thus, likely  target would  be the prostate gland and proximal seminal vesicles along with treating the lymph nodes.  Given that patient is high risk, LT-ADT (1.5-3 years) is recommended. Further, we discussed that in general ADT offers a disease-free survival and overall survival benefit in intermediate unfavorable and high risk prostate cancer patients (EORTC 94336 Trial, DART Trial). We discussed the a potential added cardiovascular risk with ADT, potentially greatest in older patients and/or in patients significant pre-existing cardiovascular risks (D'Amico et al. JCO 2007). However, this remains controversial and there is data to support no increased cardiovascular death risk with ADT (Ellison et al., JOSEPH 2011). Thus, the use of ADT must be tailored to individual patient's comorbidities.    We also discussed the general side effects and differences between radiation and surgery:     Side Effects of Radiation Therapy   The main side effects of radiation therapy for prostate cancer are irritation/injury to the rectum/bladder and erectile dysfunction.  Radiation side effects, similar to surgery, are age dependent.  Furthermore, radiation side effects are dependent on the area being treated, the total dose of radiation, and whether or not additional treatment (hormone therapy, previous surgery, etc.) is/was given.   Side effects of external beam radiation may include diarrhea and colitis (irritated intestines).  Occasionally, normal bowel function does not return after treatment is stopped.  Both during and after treatment, other side effects may include frequent urination, urge incontinence (feeling like you have to urinate all the time), burning sensation while urinating, and blood in the urine.  Less than 5% of men report problems with urinary incontinence, but this may increase over time as the effects of  radiation can increase.   Radiation therapy may also cause a feeling of fatigue, which may persist for a few months after treatment stops.  About 30% to 60% of men who receive external beam radiation develop impotence.  Impotence usually does not occur right after radiation therapy but gradually develops over one or more years.  Eventually, the rate of impotence may equal that of surgery (Kaela PROST-QA, [facility] 2008; Elmo, ProtecT Trial QOL, [facility] 2016).  Side Effects of Prostate Surgery   The main side effects of radical prostatectomy are incontinence and impotence and are both age dependent as well as type of surgery performed.   Normal bladder control usually returns within several weeks or months after radical prostatectomy.  Passing a small amount of urine, when coughing, laughing, sneezing, or exercising, may persist permanently after prostatectomy in up to 35% of men.  Some patients (between 2% and 5%) have more serious stress incontinence, which may be permanent.   During the first 3 to 12 months after radical prostatectomy, most men will have erectile dysfunction and will need to use medicines or other treatments if they wish to have an erection.  The effect of this operation on a man's ability to achieve an erection is related to the patient's age and whether nerve-sparing surgery was done.  Nearly all men who have a radical prostatectomy should expect some permanent decrease in their ability to have an erection, but younger men may expect to retain more of their ability.  If the surgeon does not remove the nerves on either side of the prostate during prostatectomy, the impotence rate is between 25% and 30% for men under 60.  But it occurs in 70% to 80% of men over 70, even if nerves on both sides are not removed.  By contrast, after standard radical prostatectomy (in which the nerves are removed), almost all men will become impotent, depending on their age.  Side effects are reported less often among men  treated at major cancer centers, where this type of surgery is performed on a more routine basis.  Specific questions/concerns were referred back to the urology oncology team to address further (Kaela PROST-QA, [facility] 2008; Elmo, ProtecT Trial QOL, [facility] 2016).    Plan     1. After discussion, patient wishes to proceed with LT ADT and RT to prostate and regional lymph nodes.     2.  Recommend 70.2Gy/26fx with lymph nodes. Plan for ADT for 1.5- 3 years. Fiducial placement and Space OAR placement 1-2 weeks prior to CT simulation. Will message Dr. Villeda's team.      All benefits and risks discussed, and patient is in agreement with the oncologic plan discussed above.     Thank you for allowing me to participate in your patient's care.  If you should require any additional information, please do not hesitate in contacting me.     Teja Martinez MD  Department of Radiation Oncology  [facility]       Again, thank you for allowing me to participate in the care of your patient.        Sincerely,        Teja Martinez MD

## 2022-10-28 NOTE — PROGRESS NOTES
Department of Radiation Oncology  [facility] Center  [facility]  [address] [address] [place] [address]  [phone]       Consultation Note    Name: Jordan Kennedy MRN: [id number]   : 1950   Date of Service: 10/28/22 Referring: Dr. Ferreira     Reason for consultation: high risk prostate cancer    History of Present Illness   Mr. Kennedy is a 72 year old male with high risk prostate cancer, cT1c, PSA 27.4, GS 3+4=7.    Briefly, his oncologic history is as follows:    The patient was initially referred to urology following an elevated PSA of 22.4 on 11/10/21. GEORGE was unremarkable.    He was referred to Dr. Villeda for further evaluation.  MRI was obtained on 2022 demonstrating a PI-RADS 5 abnormality in the right greater than left apex and mid gland measuring up to 3.4 cm in size, with possible suspicion of extraprostatic extension.  There are indeterminate pelvic lymph nodes, with no convincing bone lesions, and no evidence of seminal vesicle invasion.  Prostate volume was 31 cc.  Biopsy was performed on 2022 with pathology returning as 3+4 = 7 in 2/2 cores in the targeted lesion, with remainder of the cores negative for malignancy.  At that time patient elected no further treatment.     PSA was repeated on a 2022 now elevated to 27.4.  Given the elevation, patient was more interested in treatment options, and discussed the possibility of surgery, the patient was hesitant to pursue surgical options.  That she was referred to Dr. Ferreira for discussion of radiation therapy options.  He was recommended RT to pelvis along with long term hormone, given high risk prostate cancer.He was referred to [place] [facility] given that he lives closer to this location.    The patient presents today to discuss the potential role of radiation therapy in treatment of his prostate cancer.    Interval history:    PET PSMA 10/26/22 was negative for any  regional or distant disease. No further changes GI/ symptoms.    Symptoms:  Utility score: 80  Continence: 0 pads used per day with a bother score of 1  Potency/erectile function score: 4 with a bother score of1  IPSS score: 8/35  Quality of life due to urinary symptoms: 0  FELISA score: N/A    PSA Levels:    Date  PSA      PSA   Date Value Ref Range Status   04/15/2013 3.26 0 - 4 ug/L Final   03/07/2008 1.920 ng/mL    03/07/2008 1.920 ng/mL      Prostate Specific Antigen Screen   Date Value Ref Range Status   11/10/2021 22.40 (H) 0.00 - 4.00 ug/L Final     PSA Tumor Marker   Date Value Ref Range Status   08/11/2022 27.80 (H) 0.00 - 4.00 ug/L Final       PSA DT: 45 months    PSA Density: 0.88    Life Expectancy: 12.59 years    Sujit Risks: Organ confined 52%, extracapsular extension 34%, seminal vesicle invasion 8%, lymph node involvement 5%        Past Medical History:   Past Medical History:   Diagnosis Date     Prostate cancer (H) 02/02/2022     Seizure disorder (H)        Past Surgical History:   Past Surgical History:   Procedure Laterality Date     PROSTATE BIOPSY  02/02/2022       Chemotherapy History:  No prior chemotherapy    Radiation History:  No prior radiation  Pregnant: Not Applicable  Implanted Cardiac Devices: No    Medications:  Current Outpatient Medications   Medication     lovastatin (MEVACOR) 40 MG tablet     multivitamin w/minerals (MULTI-VITAMIN) tablet     phenytoin (DILANTIN) 100 MG capsule     No current facility-administered medications for this visit.         Allergies:   No Known Allergies    Social History:  Tobacco: Non-smoker  Alcohol: No alcohol  Employment: Retired      Family History:  Family History   Problem Relation Age of Onset     Breast Cancer Mother      Prostate Cancer Father        Review of Systems   A 10-point review of systems was performed. Pertinent findings are noted in the HPI.    Physical Exam   ECOG Status: 2    Vitals:  /80 (BP Location: Left  arm, Patient Position: Chair, Cuff Size: Adult Regular)   Pulse 108   Temp 97.7  F (36.5  C) (Oral)   Resp 18   Wt 82.6 kg (182 lb)   SpO2 99%   BMI 28.51 kg/m      Gen: Alert, in NAD  Head: NC/AT  Eyes: PERRL, EOMI, sclera anicteric  Ears: No external auricular lesions  Nose/sinus: No rhinorrhea or epistaxis  Oral cavity/oropharynx: MMM, no visible oral cavity lesions  Neck: Full ROM, supple, no palpable adenopathy  Pulm: No wheezing, stridor or respiratory distress  CV: Extremities are warm and well-perfused, no cyanosis, no pedal edema  Abdominal: Normal bowel sounds, soft, nontender, no masses  : deferred   Musculoskeletal: Normal bulk and tone  Skin: Normal color and turgor  Neuro: A/Ox3, CN II-XII intact, normal gait    Imaging/Path/Labs   Imaging: per HPI, personally reviewed and in agreement     Path: per HPI, personally reviewed and in agreement     Labs: per HPI, personally reviewed and in agreement     Assessment      Mr. Kennedy is a 72 year old male with high risk prostate cancer, cT1c, PSA 27.4, GS 3+4=7. PET/PSMA negative for regional or distant disease.    We discussed the management of high/very-high risk prostate cancer per NCCN guidelines.  In patients with a life expectancy greater than 5 years, options include external beam with LT-ADT (1.5-3 years) or radical prostatectomy with pelvic lymph node dissection. In patient with life expectancy less than 5 years, options include observation, or AT, or EBRT alone.    We discussed options of radiation particularly external beam radiation.  We discussed treating patient with radiation alone with a hypofractionated approach (preferred) with 70.2 Gy/26 fractions.  Although his lymph node risk is is 5% on Sujit Tables, he does haves some indeterminate lymph nodes. Thus, likely  target would  be the prostate gland and proximal seminal vesicles along with treating the lymph nodes.  Given that patient is high risk, LT-ADT (1.5-3 years) is  recommended. Further, we discussed that in general ADT offers a disease-free survival and overall survival benefit in intermediate unfavorable and high risk prostate cancer patients (EORTC 58400 Trial, DART Trial). We discussed the a potential added cardiovascular risk with ADT, potentially greatest in older patients and/or in patients significant pre-existing cardiovascular risks (SARWAT'Amico et al. JCO 2007). However, this remains controversial and there is data to support no increased cardiovascular death risk with ADT (Ellison et al., JOSEPH 2011). Thus, the use of ADT must be tailored to individual patient's comorbidities.    We also discussed the general side effects and differences between radiation and surgery:     Side Effects of Radiation Therapy   The main side effects of radiation therapy for prostate cancer are irritation/injury to the rectum/bladder and erectile dysfunction.  Radiation side effects, similar to surgery, are age dependent.  Furthermore, radiation side effects are dependent on the area being treated, the total dose of radiation, and whether or not additional treatment (hormone therapy, previous surgery, etc.) is/was given.   Side effects of external beam radiation may include diarrhea and colitis (irritated intestines).  Occasionally, normal bowel function does not return after treatment is stopped.  Both during and after treatment, other side effects may include frequent urination, urge incontinence (feeling like you have to urinate all the time), burning sensation while urinating, and blood in the urine.  Less than 5% of men report problems with urinary incontinence, but this may increase over time as the effects of radiation can increase.   Radiation therapy may also cause a feeling of fatigue, which may persist for a few months after treatment stops.  About 30% to 60% of men who receive external beam radiation develop impotence.  Impotence usually does not occur right after radiation therapy  but gradually develops over one or more years.  Eventually, the rate of impotence may equal that of surgery (Kaela PROST-QA, NEJM 2008; Elmo, ProtecT Trial QOL, NEJ 2016).  Side Effects of Prostate Surgery   The main side effects of radical prostatectomy are incontinence and impotence and are both age dependent as well as type of surgery performed.   Normal bladder control usually returns within several weeks or months after radical prostatectomy.  Passing a small amount of urine, when coughing, laughing, sneezing, or exercising, may persist permanently after prostatectomy in up to 35% of men.  Some patients (between 2% and 5%) have more serious stress incontinence, which may be permanent.   During the first 3 to 12 months after radical prostatectomy, most men will have erectile dysfunction and will need to use medicines or other treatments if they wish to have an erection.  The effect of this operation on a man's ability to achieve an erection is related to the patient's age and whether nerve-sparing surgery was done.  Nearly all men who have a radical prostatectomy should expect some permanent decrease in their ability to have an erection, but younger men may expect to retain more of their ability.  If the surgeon does not remove the nerves on either side of the prostate during prostatectomy, the impotence rate is between 25% and 30% for men under 60.  But it occurs in 70% to 80% of men over 70, even if nerves on both sides are not removed.  By contrast, after standard radical prostatectomy (in which the nerves are removed), almost all men will become impotent, depending on their age.  Side effects are reported less often among men treated at [facility] cancer centers, where this type of surgery is performed on a more routine basis.  Specific questions/concerns were referred back to the urology oncology team to address further (Kaela PROST-QA, NEJM 2008; Elmo, ProtecT Trial QOL, NEJ 2016).    Plan     1. After  discussion, patient wishes to proceed with LT ADT and RT to prostate and regional lymph nodes.     2.  Recommend 70.2Gy/26fx with lymph nodes. Plan for ADT for 1.5- 3 years. Fiducial placement and Space OAR placement 1-2 weeks prior to CT simulation. Will message Dr. Villeda's team.      All benefits and risks discussed, and patient is in agreement with the oncologic plan discussed above.     Thank you for allowing me to participate in your patient's care.  If you should require any additional information, please do not hesitate in contacting me.     Teja Martinez MD  Department of Radiation Oncology  [facility]

## 2022-10-28 NOTE — NURSING NOTE
"Oncology Rooming Note    October 28, 2022 2:27 PM   Jordan Kennedy is a 72 year old male who presents for:    Chief Complaint   Patient presents with     Cancer     Radiation oncology return consultation with Dr. Martinez     Initial Vitals: /80 (BP Location: Left arm, Patient Position: Chair, Cuff Size: Adult Regular)   Pulse 108   Temp 97.7  F (36.5  C) (Oral)   Resp 18   Wt 82.6 kg (182 lb)   SpO2 99%   BMI 28.51 kg/m   Estimated body mass index is 28.51 kg/m  as calculated from the following:    Height as of 9/7/22: 1.702 m (5' 7\").    Weight as of this encounter: 82.6 kg (182 lb). Body surface area is 1.98 meters squared.  No Pain (0) Comment: Data Unavailable   No LMP for male patient.  Allergies reviewed: Yes  Medications reviewed: Yes    Medications: Medication refills not needed today.  Pharmacy name entered into [employer]: [facility] [address] [place] [address]    Clinical concerns: Patient presents to clinic for return consultation with Dr. Teja Martinez, review treatment plan and sign consent.      In-basket message sent to Dr. Villeda's urology team to assist in scheduling patient for ADT to be managed by Dr. Villeda and placement of CT compatible space OAR and fiducials.  Request to contact patient's brother Vika for appointment scheduling.      Cora Lake RN              "

## 2022-10-28 NOTE — PATIENT INSTRUCTIONS
Please contact [facility] RN with questions or concerns following today's appointment: [phone].    Thank you!

## 2022-11-07 DIAGNOSIS — E78.5 HYPERLIPIDEMIA LDL GOAL <130: ICD-10-CM

## 2022-11-07 DIAGNOSIS — G40.909 SEIZURE DISORDER (H): ICD-10-CM

## 2022-11-08 RX ORDER — PHENYTOIN SODIUM 100 MG/1
100 CAPSULE, EXTENDED RELEASE ORAL DAILY
Qty: 90 CAPSULE | Refills: 0 | Status: SHIPPED | OUTPATIENT
Start: 2022-11-08 | End: 2023-02-20

## 2022-11-08 RX ORDER — LOVASTATIN 40 MG
40 TABLET ORAL AT BEDTIME
Qty: 90 TABLET | Refills: 0 | Status: SHIPPED | OUTPATIENT
Start: 2022-11-08 | End: 2023-02-20

## 2022-11-15 DIAGNOSIS — C61 PROSTATE CANCER (H): Primary | ICD-10-CM

## 2022-11-17 ENCOUNTER — PREP FOR PROCEDURE (OUTPATIENT)
Dept: UROLOGY | Facility: CLINIC | Age: 72
End: 2022-11-17

## 2022-11-17 DIAGNOSIS — C61 PROSTATE CANCER (H): Primary | ICD-10-CM

## 2022-11-17 RX ORDER — CEFAZOLIN SODIUM 2 G/50ML
2 SOLUTION INTRAVENOUS
Status: CANCELLED | OUTPATIENT
Start: 2022-11-17

## 2022-11-17 RX ORDER — CEFAZOLIN SODIUM 2 G/50ML
2 SOLUTION INTRAVENOUS SEE ADMIN INSTRUCTIONS
Status: CANCELLED | OUTPATIENT
Start: 2022-11-17

## 2022-11-19 ENCOUNTER — HEALTH MAINTENANCE LETTER (OUTPATIENT)
Age: 72
End: 2022-11-19

## 2022-11-21 ENCOUNTER — TELEPHONE (OUTPATIENT)
Dept: UROLOGY | Facility: CLINIC | Age: 72
End: 2022-11-21

## 2022-11-21 NOTE — TELEPHONE ENCOUNTER
Called patient to schedule surgery with Dr. Henley there was no answer, left voice message for patient to callback direct line to schedule.     Joan Monique on 11/21/2022 at 10:14 AM

## 2022-11-22 NOTE — TELEPHONE ENCOUNTER
Patient is scheduled for surgery with Dr. Henley      Spoke with: Patients brother Vika via phone      Date of Surgery: Friday January 13, 2023     Location: [facility]      Informed patient they will need an adult : Yes     Pre-op: Yes     PAC EVAL: Friday December 30, 2022     Pre-procedure COVID-19 Test: Home test     Additional imaging/appointments:     Post-op:     Additional comments:      Surgery packet: Sent via mail 11/22/22     Patient is aware that surgery time is tentative to change and to expect a call 3-1 business days from Pre Admission Nursing for instructions and arrival time

## 2022-11-23 NOTE — TELEPHONE ENCOUNTER
FUTURE VISIT INFORMATION      SURGERY INFORMATION:    Date: 1/13/23    Location: [facility]    Surgeon:  Daniel Henley MD    Anesthesia Type:  General    Procedure: Transrectal ultrasound guided placement of fiducials and SpaceOar    RECORDS REQUESTED FROM:       Primary Care Provider: [facility]

## 2022-12-05 ENCOUNTER — INFUSION THERAPY VISIT (OUTPATIENT)
Dept: INFUSION THERAPY | Facility: CLINIC | Age: 72
End: 2022-12-05
Payer: MEDICARE

## 2022-12-05 VITALS
RESPIRATION RATE: 18 BRPM | HEART RATE: 109 BPM | SYSTOLIC BLOOD PRESSURE: 120 MMHG | WEIGHT: 181.7 LBS | DIASTOLIC BLOOD PRESSURE: 78 MMHG | TEMPERATURE: 97.5 F | BODY MASS INDEX: 28.46 KG/M2 | OXYGEN SATURATION: 93 %

## 2022-12-05 DIAGNOSIS — C61 PROSTATE CANCER (H): Primary | ICD-10-CM

## 2022-12-05 PROCEDURE — 99207 PR NO CHARGE LOS: CPT

## 2022-12-05 PROCEDURE — 96402 CHEMO HORMON ANTINEOPL SQ/IM: CPT | Performed by: NURSE PRACTITIONER

## 2022-12-05 ASSESSMENT — PAIN SCALES - GENERAL: PAINLEVEL: NO PAIN (0)

## 2022-12-05 NOTE — PROGRESS NOTES
Infusion Nursing Note:  Jordan Kennedy presents today for C1D1 Lupron.    Patient seen by provider today: No   present during visit today: Not Applicable.    Note: Provided written education handout on Lupron.    Intravenous Access:  No Intravenous access/labs at this visit.    Treatment Conditions:  Not Applicable.    Post Infusion Assessment:  Patient tolerated injection without incident.  Site patent and intact, free from redness, edema or discomfort.     Discharge Plan:   Patient discharged in stable condition accompanied by: brother.  Departure Mode: Ambulatory.      Kati Ramirez RN

## 2022-12-17 ENCOUNTER — HEALTH MAINTENANCE LETTER (OUTPATIENT)
Age: 72
End: 2022-12-17

## 2022-12-21 ENCOUNTER — MYC MEDICAL ADVICE (OUTPATIENT)
Dept: FAMILY MEDICINE | Facility: CLINIC | Age: 72
End: 2022-12-21

## 2022-12-28 NOTE — TELEPHONE ENCOUNTER
Patient read [employer] message reminding him that he needs to schedule his physical.    TIFF Ralph  [facility]

## 2022-12-30 ENCOUNTER — PRE VISIT (OUTPATIENT)
Dept: SURGERY | Facility: CLINIC | Age: 72
End: 2022-12-30

## 2022-12-30 ENCOUNTER — OFFICE VISIT (OUTPATIENT)
Dept: SURGERY | Facility: CLINIC | Age: 72
End: 2022-12-30
Payer: MEDICARE

## 2022-12-30 ENCOUNTER — ANESTHESIA EVENT (OUTPATIENT)
Dept: SURGERY | Facility: CLINIC | Age: 72
End: 2022-12-30
Payer: MEDICARE

## 2022-12-30 ENCOUNTER — LAB (OUTPATIENT)
Dept: LAB | Facility: CLINIC | Age: 72
End: 2022-12-30
Payer: MEDICARE

## 2022-12-30 VITALS
RESPIRATION RATE: 16 BRPM | HEIGHT: 67 IN | TEMPERATURE: 97.7 F | BODY MASS INDEX: 28.53 KG/M2 | WEIGHT: 181.8 LBS | OXYGEN SATURATION: 95 % | SYSTOLIC BLOOD PRESSURE: 109 MMHG | HEART RATE: 109 BPM | DIASTOLIC BLOOD PRESSURE: 76 MMHG

## 2022-12-30 DIAGNOSIS — Z01.818 PREOP EXAMINATION: Primary | ICD-10-CM

## 2022-12-30 DIAGNOSIS — Z01.818 PREOP EXAMINATION: ICD-10-CM

## 2022-12-30 DIAGNOSIS — C61 PROSTATE CANCER (H): ICD-10-CM

## 2022-12-30 LAB
ANION GAP SERPL CALCULATED.3IONS-SCNC: 9 MMOL/L (ref 7–15)
BASOPHILS # BLD AUTO: 0 10E3/UL (ref 0–0.2)
BASOPHILS NFR BLD AUTO: 1 %
BUN SERPL-MCNC: 18.6 MG/DL (ref 8–23)
CALCIUM SERPL-MCNC: 9.9 MG/DL (ref 8.8–10.2)
CHLORIDE SERPL-SCNC: 104 MMOL/L (ref 98–107)
CREAT SERPL-MCNC: 0.86 MG/DL (ref 0.67–1.17)
DEPRECATED HCO3 PLAS-SCNC: 27 MMOL/L (ref 22–29)
EOSINOPHIL # BLD AUTO: 0.2 10E3/UL (ref 0–0.7)
EOSINOPHIL NFR BLD AUTO: 2 %
ERYTHROCYTE [DISTWIDTH] IN BLOOD BY AUTOMATED COUNT: 11.8 % (ref 10–15)
GFR SERPL CREATININE-BSD FRML MDRD: >90 ML/MIN/1.73M2
GLUCOSE SERPL-MCNC: 124 MG/DL (ref 70–99)
HCT VFR BLD AUTO: 49 % (ref 40–53)
HGB BLD-MCNC: 17 G/DL (ref 13.3–17.7)
IMM GRANULOCYTES # BLD: 0 10E3/UL
IMM GRANULOCYTES NFR BLD: 0 %
LYMPHOCYTES # BLD AUTO: 2.7 10E3/UL (ref 0.8–5.3)
LYMPHOCYTES NFR BLD AUTO: 40 %
MCH RBC QN AUTO: 32 PG (ref 26.5–33)
MCHC RBC AUTO-ENTMCNC: 34.7 G/DL (ref 31.5–36.5)
MCV RBC AUTO: 92 FL (ref 78–100)
MONOCYTES # BLD AUTO: 0.6 10E3/UL (ref 0–1.3)
MONOCYTES NFR BLD AUTO: 9 %
NEUTROPHILS # BLD AUTO: 3.3 10E3/UL (ref 1.6–8.3)
NEUTROPHILS NFR BLD AUTO: 48 %
NRBC # BLD AUTO: 0 10E3/UL
NRBC BLD AUTO-RTO: 0 /100
PLATELET # BLD AUTO: 196 10E3/UL (ref 150–450)
POTASSIUM SERPL-SCNC: 4.5 MMOL/L (ref 3.4–5.3)
RBC # BLD AUTO: 5.31 10E6/UL (ref 4.4–5.9)
SODIUM SERPL-SCNC: 140 MMOL/L (ref 136–145)
WBC # BLD AUTO: 6.8 10E3/UL (ref 4–11)

## 2022-12-30 PROCEDURE — 36415 COLL VENOUS BLD VENIPUNCTURE: CPT | Performed by: PATHOLOGY

## 2022-12-30 PROCEDURE — 99204 OFFICE O/P NEW MOD 45 MIN: CPT | Performed by: PHYSICIAN ASSISTANT

## 2022-12-30 PROCEDURE — 80048 BASIC METABOLIC PNL TOTAL CA: CPT | Performed by: PATHOLOGY

## 2022-12-30 PROCEDURE — 85025 COMPLETE CBC W/AUTO DIFF WBC: CPT | Performed by: PATHOLOGY

## 2022-12-30 ASSESSMENT — ENCOUNTER SYMPTOMS: SEIZURES: 1

## 2022-12-30 ASSESSMENT — PAIN SCALES - GENERAL: PAINLEVEL: NO PAIN (0)

## 2022-12-30 ASSESSMENT — LIFESTYLE VARIABLES: TOBACCO_USE: 0

## 2022-12-30 NOTE — H&P
Pre-Operative H & P     CC:  Preoperative exam to assess for increased cardiopulmonary risk while undergoing surgery and anesthesia.    Date of Encounter: 12/30/2022  Primary Care Physician:  Mehreen Strange     Reason for Visit: Prostate cancer (H)    HPI  Jordan Kennedy is a 72 year old male who presents for pre-operative H & P in preparation for  Procedure Information     Case: [id number] Date/Time: 01/13/23 0730    Procedure: Transrectal ultrasound guided placement of fiducials and SpaceOar (Perineum)    Anesthesia type: General    Diagnosis: Prostate cancer (H) [C61]    Pre-op diagnosis: Prostate cancer (H) [C61]    Location: [facility] / [facility]    Providers: Daniel Henley MD          Patient is being evaluated for comorbid conditions of epilepsy and HLD.    Mr. Kennedy was found to have an elevated PSA of 22.4(11/10/2021) compared to a prior PSA of 3.25 in 2013. MRI demonstrated a suspicious lesion. Pathology showed prostatic adenocarcinoma in the PI-RADS 5 lesion. He now presents for the above procedure.    History was obtained from patient & chart review. He is accompanied by his brother.       Hx of abnormal bleeding or anti-platelet use: denies      Past Medical History  Past Medical History:   Diagnosis Date     HLD (hyperlipidemia)      Mild cognitive impairment      Prostate cancer (H) 02/02/2022     Seizure disorder (H)        Past Surgical History  Past Surgical History:   Procedure Laterality Date     PROSTATE BIOPSY  02/02/2022       Prior to Admission Medications  Current Outpatient Medications   Medication Sig Dispense Refill     lovastatin (MEVACOR) 40 MG tablet Take 1 tablet (40 mg) by mouth At Bedtime 90 tablet 0     multivitamin w/minerals (MULTI-VITAMIN) tablet Take 1 tablet by mouth daily       phenytoin (DILANTIN) 100 MG ER capsule Take 1 capsule (100 mg) by mouth daily (Patient taking differently: Take 100 mg by mouth At Bedtime) 90 capsule 0       Allergies  No Known  Allergies    Social History  Social History     Socioeconomic History     Marital status: Single     Spouse name: Not on file     Number of children: Not on file     Years of education: Not on file     Highest education level: Not on file   Occupational History     Not on file   Tobacco Use     Smoking status: Never     Smokeless tobacco: Never   Vaping Use     Vaping Use: Never used   Substance and Sexual Activity     Alcohol use: Yes     Comment: Once in a while     Drug use: No     Sexual activity: Never   Other Topics Concern     Parent/sibling w/ CABG, MI or angioplasty before 65F 55M? No   Social History Narrative     Not on file     Social Determinants of Health     Financial Resource Strain: Low Risk      Difficulty of Paying Living Expenses: Not hard at all   Food Insecurity: No Food Insecurity     Worried About Running Out of Food in the Last Year: Never true     Ran Out of Food in the Last Year: Never true   Transportation Needs: No Transportation Needs     Lack of Transportation (Medical): No     Lack of Transportation (Non-Medical): No   Physical Activity: Not on file   Stress: Not on file   Social Connections: Not on file   Intimate Partner Violence: Not on file   Housing Stability: Not on file       Family History  Family History   Problem Relation Age of Onset     Breast Cancer Mother      Prostate Cancer Father      Anesthesia Reaction No family hx of      Deep Vein Thrombosis (DVT) No family hx of        Review of Systems  The complete review of systems is negative other than noted in the HPI or here.     Anesthesia Evaluation   Pt has had prior anesthetic.     No history of anesthetic complications       ROS/MED HX  ENT/Pulmonary:  - neg pulmonary ROS  (-) tobacco use, asthma and sleep apnea   Neurologic: Comment: Taking Dilantin    (+) no peripheral neuropathy seizures, last seizure: >15 years ago,  (-) no CVA   Cardiovascular:     (+) Dyslipidemia -----    METS/Exercise Tolerance: 4 - Raking  "leaves, gardening    Hematologic:  - neg hematologic  ROS  (-) history of blood clots and history of blood transfusion   Musculoskeletal: Comment: knee  (+) arthritis,     GI/Hepatic:    (-) GERD and liver disease   Renal/Genitourinary:  - neg Renal ROS  (-) renal disease   Endo:  - neg endo ROS  (-) Type II DM   Psychiatric/Substance Use:  - neg psychiatric ROS     Infectious Disease: Comment: Hx rheumatic fever in childhood      Malignancy:   (+) Malignancy, History of Prostate.Prostate CA Active status post.        Other:  - neg other ROS          /76 (BP Location: Right arm, Patient Position: Sitting, Cuff Size: Adult Regular)   Pulse 109   Temp 97.7  F (36.5  C) (Oral)   Resp 16   Ht 1.702 m (5' 7\")   Wt 82.5 kg (181 lb 12.8 oz)   SpO2 95%   BMI 28.47 kg/m      Physical Exam  Constitutional: Awake, alert, cooperative, no apparent distress, and appears stated age.  Eyes: Pupils equal, round and reactive to light, extra ocular muscles intact, sclera clear, conjunctiva normal.  HENT: Normocephalic, oral pharynx with moist mucus membranes, good dentition. No goiter appreciated. No removable dental hardware.  Respiratory: Clear to auscultation bilaterally, no crackles or wheezing. No SOB when supine.  Cardiovascular: Regular rate and rhythm, normal S1 and S2, and no murmur noted.  Carotids +2, no bruits. No edema. Palpable pulses to radial, DP and PT arteries.   GI: Normal bowel sounds, soft, non-distended, non-tender, no masses palpated.    Lymph/Hematologic: No cervical lymphadenopathy and no supraclavicular lymphadenopathy.  Genitourinary:  deferred  Skin: Warm and dry.  No rashes.   Musculoskeletal: full ROM of neck. There is no redness, warmth, or swelling of the joints. Gross motor strength is normal.   Neurologic: Awake, alert, oriented to name, place and time. Cranial nerves II-XII are grossly intact. Gait is normal. Ambulates from chair to exam table, seats self, lies supine and sits back up " w/o assistance.  Neuropsychiatric: Calm, cooperative. Normal affect. Pleasant. Answers simple questions appropriately, follows one-step commands w/o difficulty.         PRIOR LABS/DIAGNOSTIC STUDIES:    All labs and imaging personally reviewed      MRI PROSTATE: 1/8/2022  IMPRESSION:  1. Based on the most suspicious abnormality, this exam is  characterized as PIRADS 5 - Clinically significant cancer is highly  likely to be present.  The most suspicious abnormality is located at  the right greater than left apex, mid gland and base peripheral and  transitional zone of the prostate, 8-2:00 position and there is high  suspicion of extraprostatic extension.    2. Indeterminate pelvic lymph nodes. Heterogeneous bone marrow  attenuation with no convincing discrete bone lesions. Consider further  evaluation with nuclear medicine bone scan for better  characterization.    3. Additional incidental findings as described above.         PATHOLOGY 2/2/22    Specimens:   A) - Prostate, [place], Left, Prostate needle biopsy, apex, left                                       B) - Prostate, Mid, Left, Prostate needle biopsy, mid, left                                         C) - Prostate, Base, Left, Prostate needle biopsy, base, left                                       D) - Prostate, [place], Right, Prostate needle biopsy, apex, right                                     E) - Prostate, Mid, Right, Prostate needle biopsy, mid, right                                       F) - Prostate, Base, Right, Prostate needle biopsy, base, right                                     G) - Prostate, Target 1                                                                  Final Diagnosis    A.  Prostate, left apex, biopsy:  - Benign prostate tissue    B.  Prostate, left mid, biopsy:  - Benign prostate tissue    C.  Prostate, left base, biopsy:  - Benign prostate tissue    D.  Prostate, right apex, biopsy:  - Benign prostate tissue    E.  Prostate, right  mid, biopsy:  - Benign prostate tissue    F.  Prostate, right base, biopsy:  - Benign prostate tissue    G.  Prostate, target 1, biopsy:  - Prostatic adenocarcinoma  - Jayesh score 7 (3+4; 10% pattern 4)  - Grade group 2  - Extent: Involves 2 of 2 cores (5 mm, 30% and 5 mm, 30%)      The patient's records and results personally reviewed by this provider.         LAB/DIAGNOSTIC STUDIES TODAY:  BMP, CBC    WBC Count 4.0 - 11.0 10e3/uL 6.8  4.0 R      RBC Count 4.40 - 5.90 10e6/uL 5.31  5.23 R     Hemoglobin 13.3 - 17.7 g/dL 17.0  16.4     Hematocrit 40.0 - 53.0 % 49.0  49.9     MCV 78 - 100 fL 92  95 R     MCH 26.5 - 33.0 pg 32.0  31.4     MCHC 31.5 - 36.5 g/dL 34.7  32.9     RDW 10.0 - 15.0 % 11.8  12.4     Platelet Count 150 - 450 10e3/uL 196  184 R     % Neutrophils % 48      % Lymphocytes % 40      % Monocytes % 9      % Eosinophils % 2      % Basophils % 1      % Immature Granulocytes % 0      NRBCs per 100 WBC <1 /100 0      Absolute Neutrophils 1.6 - 8.3 10e3/uL 3.3      Absolute Lymphocytes 0.8 - 5.3 10e3/uL 2.7      Absolute Monocytes 0.0 - 1.3 10e3/uL 0.6      Absolute Eosinophils 0.0 - 0.7 10e3/uL 0.2      Absolute Basophils 0.0 - 0.2 10e3/uL 0.0      Absolute Immature Granulocytes <=0.4 10e3/uL 0.0      Absolute NRBCs 10e3/uL 0.0       Sodium 136 - 145 mmol/L 140  138 R  141 R  141 R  141 R  142 R  141 R      Potassium 3.4 - 5.3 mmol/L 4.5           Chloride 98 - 107 mmol/L 104           Carbon Dioxide (CO2) 22 - 29 mmol/L 27           Anion Gap 7 - 15 mmol/L 9  8 R  9 R  9 R  12 R  13 R  11.0 R     Urea Nitrogen 8.0 - 23.0 mg/dL 18.6           Creatinine 0.67 - 1.17 mg/dL 0.86  0.88 R  0.87 R  0.86 R  0.88 R  0.83 R  0.8 R     Calcium 8.8 - 10.2 mg/dL 9.9  9.2 R  9.5 R  9.1 R  9.6 R  9.5 R  9.5 R     Glucose 70 - 99 mg/dL 124 High            GFR Estimate >60 mL/min/1.73m2 >90  86 CM  88 R, CM  88 R, CM  86 R, CM  >90 R            Assessment      Jordan Kennedy is a 72 year old male seen as a PAC  "referral for risk assessment and optimization for anesthesia.    Plan/Recommendations  Pt will be optimized for the proposed procedure.  See below for details on the assessment, risk, and preoperative recommendations    NEUROLOGY  - Epilepsy, last seizure >15 yrs ago. On Dilantin.  - No history of TIA or CVA     -Post Op delirium risk factors:  History of pre-existing cognitive dysfunction (mild cognitive impairment)    ENT  - No current airway concerns.  Will need to be reassessed day of surgery.  Mallampati: II  TM: > 3    CARDIAC  - No history of CAD, Hypertension and Afib  - METS (Metabolic Equivalents)  Patient performs 4 or more METS exercise without symptoms            Total Score: 0      RCRI-Very low risk: Class 1 0.4% complication rate            Total Score: 0        PULMONARY  ANAYELI Low Risk            Total Score: 2    ANAYELI: Over 50 ys old    ANAYELI: Male      - Denies asthma or inhaler use  - Tobacco History      History   Smoking Status     Never   Smokeless Tobacco     Never       GI  - Denies GERD  PONV Low Risk  Total Score: 1           1 AN PONV: Patient is not a current smoker        /RENAL  - Prostate cancer    ENDOCRINE    - BMI: Estimated body mass index is 28.47 kg/m  as calculated from the following:    Height as of this encounter: 1.702 m (5' 7\").    Weight as of this encounter: 82.5 kg (181 lb 12.8 oz).  Overweight (BMI 25.0-29.9)  - No history of Diabetes Mellitus    HEME  VTE High Risk 3%            Total Score: 8    VTE: Greater than 59 yrs old    VTE: Male    VTE: Current cancer      - No history of abnormal bleeding or antiplatelet use.      PSYCH  - Mild congnitive impairment    The patient is aware that the final anesthesia plan will be decided by the assigned anesthesia provider on the date of service.    The patient is optimized for their procedure. AVS with information on surgery time/arrival time, meds and NPO status given by nursing staff. No further diagnostic testing " indicated.      On the day of service:     Prep time: 13 minutes  Visit time: 23 minutes  Documentation time: 14 minutes  ------------------------------------------  Total time: 50 minutes      Nadya Johnson PA-C  Preoperative Assessment Center  [facility]  Clinic and Surgery Center  Phone: [phone]  Fax: [phone]

## 2022-12-30 NOTE — PATIENT INSTRUCTIONS
Preparing for Your Surgery      Name:  Jordan Kennedy   MRN:  [id number]   :  1950   Today's Date:  2022       Arriving for surgery:  Surgery date:  23  Arrival time:  5:30 am     Surgeries and procedures: Adult patients can have 2 visitors all through the surgery process.     Visiting hours: 8 a.m. to 8:30 p.m.     Hospital: Adult patients and children under age 18 can have 4 visitor at a time     No visitors under the age of 5 are allowed for hospital patients.  Double occupancy rooms: Patients can have only two visitors at a time.     Patients with disabilities: Can have a support person with them (family member, service provider     Or someone well informed about their needs) plus the allowed number of visitors     Patients confirmed or suspected to have symptoms of COVID 19 or flu:     No visitors allowed for adult patients.   Children (under age 18) can have 1 named visitor.     People who are sick or showing symptoms of COVID 19 or flu:    Are not allowed to visit patients--we can only make exceptions in special situations.       Please follow these guidelines for your visit:   Arrive wearing a mask over your mouth and nose; we will give you a medical mask to wear    If you arrive wearing a cloth mask.   Keep it on during your entire visit, even when in patient's room.   If you don't wear a mask we'll ask you to leave.     Clean your hands with alcohol hand . Do this when you arrive at and leave the building and patient room,    And again after you touch your mask or anything in the room.     You can t visit if you have a fever, cough, shortness of breath, muscle aches, headaches, sore throat    Or diarrhea      Stay 6 feet away from others during your visit and between visits     Go directly to and from the room you are visiting.     Stay in the patient s room during your visit. Limit going to other places in the hospital as much as possible     Leave bags and jackets at home  or in the car.     For everyone s health, please don t come and go during your visit. That includes for smoking   during your visit.     Please come to:     [facility] [address]  [address] [place]  [address]    - parking is available in front of [facility] from 5:15AM to 8:00PM. If you prefer, park your car in the Green Lot.    -Proceed to the 3rd floor, check in at the Adult Surgery Waiting Lounge. [phone]    If an escort is needed stop at the Information Desk in the lobby. Inform the information person that you are here for surgery. An escort to the Adult Surgery Waiting Lounge will be provided.    What can I eat or drink?  -  You may eat and drink normally up to 8 hours prior to arrival time. (Until 9:30 pm on 1/12/23 - the night before surgery)  -  You may have clear liquids until 2 hours prior to arrival time. (Until 3:30 am on 1/13/23 - the morning of surgery)    Examples of clear liquids:  Water  Clear broth  Juices (apple, white grape, white cranberry  and cider) without pulp  Noncarbonated, powder based beverages  (lemonade and Johan-Aid)  Sodas (Sprite, 7-Up, ginger ale and seltzer)  Coffee or tea (without milk or cream)  Gatorade    -  No Alcohol for at least 24 hours before surgery.     Which medicines can I take?    Hold Aspirin for 7 days before surgery.   Hold Multivitamins for 7 days before surgery.  Hold Supplements for 7 days before surgery.  Hold Ibuprofen (Advil, Motrin) for 1 day before surgery--unless otherwise directed by surgeon.  Hold Naproxen (Aleve) for 4 days before surgery.    Hold all NSAIDS for 7 days before spine surgery. (Ibuprofen, Naproxen, Celebrex, Indocin, Diclofenac.)    -  DO NOT take these medications the day of surgery:  Multivitamin - stop 7 days before surgery    -  PLEASE TAKE these medications the day of surgery:  Mevacor to be taken at your usual time  Dilantin    How do I  prepare myself?  - Please take 2 showers before surgery using Scrubcare or Hibiclens soap.    Use this soap only from the neck to your toes.     Leave the soap on your skin for one minute--then rinse thoroughly.      You may use your own shampoo and conditioner. No other hair products.   - Please remove all jewelry and body piercings.  - No lotions, deodorants or fragrance.  - No makeup or fingernail polish.   - Bring your ID and insurance card.    -If you have a Deep Brain Stimulator, Spinal Cord Stimulator, or any Neuro Stimulator device---you must bring the remote control to the hospital.      ALL PATIENTS GOING HOME THE SAME DAY OF SURGERY ARE REQUIRED TO HAVE A RESPONSIBLE ADULT TO DRIVE AND BE IN ATTENDANCE WITH THEM FOR 24 HOURS FOLLOWING SURGERY.    Covid testing policy as of 12/06/2022  Your surgeon will notify and schedule you for a COVID test if one is needed before surgery--please direct any questions or COVID symptoms to your surgeon      Questions or Concerns:    - For any questions regarding the day of surgery or your hospital stay, please contact the Pre Admission Nursing Office at [phone].       - If you have health changes between today and your surgery, please call your surgeon.       - For questions after surgery, please call your surgeons office.

## 2023-01-13 ENCOUNTER — HOSPITAL ENCOUNTER (OUTPATIENT)
Facility: CLINIC | Age: 73
Discharge: HOME | End: 2023-01-13
Attending: UROLOGY | Admitting: UROLOGY
Payer: MEDICARE

## 2023-01-13 ENCOUNTER — ANESTHESIA (OUTPATIENT)
Dept: SURGERY | Facility: CLINIC | Age: 73
End: 2023-01-13
Payer: MEDICARE

## 2023-01-13 VITALS
HEART RATE: 105 BPM | TEMPERATURE: 97.3 F | SYSTOLIC BLOOD PRESSURE: 122 MMHG | WEIGHT: 186.73 LBS | BODY MASS INDEX: 29.31 KG/M2 | DIASTOLIC BLOOD PRESSURE: 85 MMHG | HEIGHT: 67 IN | OXYGEN SATURATION: 92 % | RESPIRATION RATE: 20 BRPM

## 2023-01-13 LAB
ATRIAL RATE - MUSE: 93 BPM
DIASTOLIC BLOOD PRESSURE - MUSE: NORMAL MMHG
INTERPRETATION ECG - MUSE: NORMAL
P AXIS - MUSE: 25 DEGREES
PR INTERVAL - MUSE: 166 MS
QRS DURATION - MUSE: 84 MS
QT - MUSE: 358 MS
QTC - MUSE: 445 MS
R AXIS - MUSE: -28 DEGREES
SYSTOLIC BLOOD PRESSURE - MUSE: NORMAL MMHG
T AXIS - MUSE: 25 DEGREES
VENTRICULAR RATE- MUSE: 93 BPM

## 2023-01-13 PROCEDURE — 55876 PLACE RT DEVICE/MARKER PROS: CPT | Mod: GC | Performed by: UROLOGY

## 2023-01-13 PROCEDURE — 93005 ELECTROCARDIOGRAM TRACING: CPT

## 2023-01-13 PROCEDURE — 278N000051 HC OR IMPLANT GENERAL: Performed by: UROLOGY

## 2023-01-13 PROCEDURE — 370N000017 HC ANESTHESIA TECHNICAL FEE, PER MIN: Performed by: UROLOGY

## 2023-01-13 PROCEDURE — 272N000001 HC OR GENERAL SUPPLY STERILE: Performed by: UROLOGY

## 2023-01-13 PROCEDURE — 55874 TPRNL PLMT BIODEGRDABL MATRL: CPT | Mod: GC | Performed by: UROLOGY

## 2023-01-13 PROCEDURE — 272N000002 HC OR SUPPLY OTHER OPNP: Performed by: UROLOGY

## 2023-01-13 PROCEDURE — 710N000010 HC RECOVERY PHASE 1, LEVEL 2, PER MIN: Performed by: UROLOGY

## 2023-01-13 PROCEDURE — 93010 ELECTROCARDIOGRAM REPORT: CPT | Mod: 59 | Performed by: INTERNAL MEDICINE

## 2023-01-13 PROCEDURE — 258N000003 HC RX IP 258 OP 636: Performed by: NURSE ANESTHETIST, CERTIFIED REGISTERED

## 2023-01-13 PROCEDURE — L8699 PROSTHETIC IMPLANT NOS: HCPCS | Performed by: UROLOGY

## 2023-01-13 PROCEDURE — 250N000011 HC RX IP 250 OP 636: Performed by: NURSE ANESTHETIST, CERTIFIED REGISTERED

## 2023-01-13 PROCEDURE — 250N000011 HC RX IP 250 OP 636: Performed by: ANESTHESIOLOGY

## 2023-01-13 PROCEDURE — 360N000075 HC SURGERY LEVEL 2, PER MIN: Performed by: UROLOGY

## 2023-01-13 PROCEDURE — 710N000012 HC RECOVERY PHASE 2, PER MINUTE: Performed by: UROLOGY

## 2023-01-13 PROCEDURE — 999N000141 HC STATISTIC PRE-PROCEDURE NURSING ASSESSMENT: Performed by: UROLOGY

## 2023-01-13 PROCEDURE — 250N000009 HC RX 250: Performed by: NURSE ANESTHETIST, CERTIFIED REGISTERED

## 2023-01-13 RX ORDER — SODIUM CHLORIDE, SODIUM LACTATE, POTASSIUM CHLORIDE, CALCIUM CHLORIDE 600; 310; 30; 20 MG/100ML; MG/100ML; MG/100ML; MG/100ML
INJECTION, SOLUTION INTRAVENOUS CONTINUOUS PRN
Status: DISCONTINUED | OUTPATIENT
Start: 2023-01-13 | End: 2023-01-13

## 2023-01-13 RX ORDER — FENTANYL CITRATE 50 UG/ML
INJECTION, SOLUTION INTRAMUSCULAR; INTRAVENOUS PRN
Status: DISCONTINUED | OUTPATIENT
Start: 2023-01-13 | End: 2023-01-13

## 2023-01-13 RX ORDER — PROPOFOL 10 MG/ML
INJECTION, EMULSION INTRAVENOUS CONTINUOUS PRN
Status: DISCONTINUED | OUTPATIENT
Start: 2023-01-13 | End: 2023-01-13

## 2023-01-13 RX ORDER — CEFAZOLIN SODIUM/WATER 2 G/20 ML
2 SYRINGE (ML) INTRAVENOUS
Status: DISCONTINUED | OUTPATIENT
Start: 2023-01-13 | End: 2023-01-13 | Stop reason: HOSPADM

## 2023-01-13 RX ORDER — LIDOCAINE 40 MG/G
CREAM TOPICAL
Status: DISCONTINUED | OUTPATIENT
Start: 2023-01-13 | End: 2023-01-13 | Stop reason: HOSPADM

## 2023-01-13 RX ORDER — ESMOLOL HYDROCHLORIDE 10 MG/ML
INJECTION INTRAVENOUS PRN
Status: DISCONTINUED | OUTPATIENT
Start: 2023-01-13 | End: 2023-01-13

## 2023-01-13 RX ORDER — DEXAMETHASONE SODIUM PHOSPHATE 4 MG/ML
INJECTION, SOLUTION INTRA-ARTICULAR; INTRALESIONAL; INTRAMUSCULAR; INTRAVENOUS; SOFT TISSUE PRN
Status: DISCONTINUED | OUTPATIENT
Start: 2023-01-13 | End: 2023-01-13

## 2023-01-13 RX ORDER — GLYCOPYRROLATE 0.2 MG/ML
INJECTION, SOLUTION INTRAMUSCULAR; INTRAVENOUS PRN
Status: DISCONTINUED | OUTPATIENT
Start: 2023-01-13 | End: 2023-01-13

## 2023-01-13 RX ORDER — PROPOFOL 10 MG/ML
INJECTION, EMULSION INTRAVENOUS PRN
Status: DISCONTINUED | OUTPATIENT
Start: 2023-01-13 | End: 2023-01-13

## 2023-01-13 RX ORDER — ONDANSETRON 2 MG/ML
4 INJECTION INTRAMUSCULAR; INTRAVENOUS EVERY 6 HOURS PRN
Status: DISCONTINUED | OUTPATIENT
Start: 2023-01-13 | End: 2023-01-13 | Stop reason: HOSPADM

## 2023-01-13 RX ORDER — CEFAZOLIN SODIUM/WATER 2 G/20 ML
2 SYRINGE (ML) INTRAVENOUS SEE ADMIN INSTRUCTIONS
Status: DISCONTINUED | OUTPATIENT
Start: 2023-01-13 | End: 2023-01-13 | Stop reason: HOSPADM

## 2023-01-13 RX ADMIN — DEXAMETHASONE SODIUM PHOSPHATE 10 MG: 4 INJECTION, SOLUTION INTRA-ARTICULAR; INTRALESIONAL; INTRAMUSCULAR; INTRAVENOUS; SOFT TISSUE at 08:40

## 2023-01-13 RX ADMIN — MIDAZOLAM 1 MG: 1 INJECTION INTRAMUSCULAR; INTRAVENOUS at 07:32

## 2023-01-13 RX ADMIN — FENTANYL CITRATE 50 MCG: 50 INJECTION, SOLUTION INTRAMUSCULAR; INTRAVENOUS at 07:36

## 2023-01-13 RX ADMIN — SUGAMMADEX 200 MG: 100 INJECTION, SOLUTION INTRAVENOUS at 08:42

## 2023-01-13 RX ADMIN — PHENYLEPHRINE HYDROCHLORIDE 100 MCG: 10 INJECTION INTRAVENOUS at 08:25

## 2023-01-13 RX ADMIN — SUCCINYLCHOLINE CHLORIDE 50 MG: 20 INJECTION, SOLUTION INTRAMUSCULAR; INTRAVENOUS; PARENTERAL at 08:06

## 2023-01-13 RX ADMIN — PROPOFOL 70 MG: 10 INJECTION, EMULSION INTRAVENOUS at 08:06

## 2023-01-13 RX ADMIN — ONDANSETRON 4 MG: 2 INJECTION INTRAMUSCULAR; INTRAVENOUS at 10:59

## 2023-01-13 RX ADMIN — FENTANYL CITRATE 50 MCG: 50 INJECTION, SOLUTION INTRAMUSCULAR; INTRAVENOUS at 07:56

## 2023-01-13 RX ADMIN — PROPOFOL 100 MCG/KG/MIN: 10 INJECTION, EMULSION INTRAVENOUS at 07:36

## 2023-01-13 RX ADMIN — SODIUM CHLORIDE, POTASSIUM CHLORIDE, SODIUM LACTATE AND CALCIUM CHLORIDE: 600; 310; 30; 20 INJECTION, SOLUTION INTRAVENOUS at 08:40

## 2023-01-13 RX ADMIN — GLYCOPYRROLATE 0.3 MG: 0.2 INJECTION, SOLUTION INTRAMUSCULAR; INTRAVENOUS at 07:54

## 2023-01-13 RX ADMIN — SODIUM CHLORIDE, POTASSIUM CHLORIDE, SODIUM LACTATE AND CALCIUM CHLORIDE: 600; 310; 30; 20 INJECTION, SOLUTION INTRAVENOUS at 07:32

## 2023-01-13 RX ADMIN — ESMOLOL HYDROCHLORIDE 10 MG: 10 INJECTION, SOLUTION INTRAVENOUS at 08:17

## 2023-01-13 RX ADMIN — Medication 40 MG: at 08:23

## 2023-01-13 ASSESSMENT — LIFESTYLE VARIABLES: TOBACCO_USE: 0

## 2023-01-13 ASSESSMENT — ACTIVITIES OF DAILY LIVING (ADL)
ADLS_ACUITY_SCORE: 35

## 2023-01-13 ASSESSMENT — ENCOUNTER SYMPTOMS: SEIZURES: 1

## 2023-01-13 NOTE — DISCHARGE INSTRUCTIONS
Discharge Instructions: After Your Surgery  You ve just had surgery. During surgery, you were given medicine called anesthesia to keep you relaxed and free of pain. After surgery, you may have some pain or nausea. This is common. Here are some tips for feeling better and getting well after surgery.     Stay on schedule with your medicine.   Going home  Your healthcare provider will show you how to take care of yourself when you go home. He or she will also answer your questions. Have an adult family member or friend drive you home. For the first 24 hours after your surgery:  Don't drive or use heavy equipment.  Don't make important decisions or sign legal papers.  Don't drink alcohol.  Have someone stay with you, if needed. He or she can watch for problems and help keep you safe.  Be sure to go to all follow-up visits with your healthcare provider. And rest after your surgery for as long as your healthcare provider tells you to.  Coping with pain  If you have pain after surgery, pain medicine will help you feel better. Take it as told, before pain becomes severe. Also, ask your healthcare provider or pharmacist about other ways to control pain. This might be with heat, ice, or relaxation. And follow any other instructions your surgeon or nurse gives you.  Tips for taking pain medicine  To get the best relief possible, remember these points:  Pain medicines can upset your stomach. Taking them with a little food may help.  Most pain relievers taken by mouth need at least 20 to 30 minutes to start to work.  Don't wait till your pain becomes severe before you take your medicine. Try to time your medicine so that you can take it before starting an activity. This might be before you get dressed, go for a walk, or sit down for dinner.  Constipation is a common side effect of pain medicines. Call your healthcare provider before taking any medicines such as laxatives or stool softeners to help ease constipation. Also ask  if you should skip any foods. Drinking lots of fluids and eating foods such as fruits and vegetables that are high in fiber can also help. Remember, don't take laxatives unless your surgeon has prescribed them.  Drinking alcohol and taking pain medicine can cause dizziness and slow your breathing. It can even be deadly. Don't drink alcohol while taking pain medicine.  Pain medicine can make you react more slowly to things. Don't drive or run machinery while taking pain medicine.  Your healthcare provider may tell you to take acetaminophen to help ease your pain. Ask him or her how much you are supposed to take each day. Acetaminophen or other pain relievers may interact with your prescription medicines or other over-the-counter (OTC) medicines. Some prescription medicines have acetaminophen and other ingredients. Using both prescription and OTC acetaminophen for pain can cause you to overdose. Read the labels on your OTC medicines with care. This will help you to clearly know the list of ingredients, how much to take, and any warnings. It may also help you not take too much acetaminophen. If you have questions or don't understand the information, ask your pharmacist or healthcare provider to explain it to you before you take the OTC medicine.  Managing nausea  Some people have an upset stomach after surgery. This is often because of anesthesia, pain, or pain medicine, or the stress of surgery. These tips will help you handle nausea and eat healthy foods as you get better. If you were on a special food plan before surgery, ask your healthcare provider if you should follow it while you get better. These tips may help:  Don't push yourself to eat. Your body will tell you when to eat and how much.  Start off with clear liquids and soup. They are easier to digest.  Next try semi-solid foods, such as mashed potatoes, applesauce, and gelatin, as you feel ready.  Slowly move to solid foods. Don t eat fatty, rich, or spicy  foods at first.  Don't force yourself to have 3 large meals a day. Instead eat smaller amounts more often.  Take pain medicines with a small amount of solid food, such as crackers or toast, to prevent nausea.  When to call your healthcare provider  Call your healthcare provider if:  You still have intolerable pain an hour after taking medicine. The medicine may not be strong enough.  You feel too sleepy, dizzy, or groggy. The medicine may be too strong.  You have side effects such as nausea or vomiting, or skin changes such as rash, itching, or hives. Your healthcare provider may suggest other medicines to control side effects.  Rash, itching, or hives may mean you have an allergic reaction. Report this right away. If you have trouble breathing or facial swelling, call 911 right away.  If you have obstructive sleep apnea  You were given anesthesia medicine during surgery to keep you comfortable and free of pain. After surgery, you may have more apnea spells because of this medicine and other medicines you were given. The spells may last longer than usual.   At home:  Keep using the continuous positive airway pressure (CPAP) device when you sleep. Unless your healthcare provider tells you not to, use it when you sleep, day or night. CPAP is a common device used to treat obstructive sleep apnea.  Talk with your provider before taking any pain medicine, muscle relaxants, or sedatives. Your provider will tell you about the possible dangers of taking these medicines.  [employer] last reviewed this educational content on 3/1/2019    1374-1987 [employer]. All rights reserved. This information is not intended as a substitute for professional medical care. Always follow your healthcare professional's instructions.

## 2023-01-13 NOTE — OP NOTE
Procedure Date: 01/13/2023    PREOPERATIVE DIAGNOSIS:  Prostate cancer.    POSTOPERATIVE DIAGNOSIS:  Prostate cancer.    PROCEDURE:  Transrectal ultrasound-guided placement of fiducials and bronchoscopy.    SURGEON:  Daniel Henley MD.    ASSISTANT:  Dennis Vieira MD.    INDICATIONS FOR PROCEDURE:  Mr. Kennedy is a 72-year-old gentleman with history of prostate cancer.  He presents today for placement of SpaceOAR and fiducials in preparation for radiation therapy.    DESCRIPTION OF PROCEDURE:  After informed consent was obtained, the patient was brought to the operating room where he was administered MAC anesthesia.  After suitable level of anesthesia was obtained, he was placed in lithotomy position with all pressure points padded.  He was administered preoperative antibiotics.  He was prepped and draped in standard sterile fashion.  The transrectal ultrasound probe was placed into the rectum, allowing good visualization of the prostate.  However, we were unable to achieve a level of adequate sedation under MAC.  The anesthesia team therefore converted the patient to a general anesthetic with an endotracheal tube.  During intubation, there was some concern over a possible small amount of aspiration.  Anesthesia then performed a bronchoscopy and confirmed a small amount of aspiration.  The patient, however, maintained his oxygen saturations and had no other difficulties with anesthesia.    Under transrectal ultrasound guidance, two fiducial markers were placed on the right side of the prostate and one on the left.  We then again under transrectal ultrasound guidance, placed the finder needle between the prostate and the rectum.  After confirmatory injection of saline, 10 mL of SpaceOAR was injected in this location, resulting in an excellent lift of the prostate off the rectum.  The needle puncture sites were dressed with bacitracin, gauze and a Tegaderm.  The patient was then awakened, extubated, and  brought to recovery room in stable condition.      ESTIMATED BLOOD LOSS:  1 mL.    Daniel Henley MD        D: 2023   T: 2023   MT: CECILIA    Name:     SHAVON DELA CRUZ  MRN:      [id number]        Account:        [id number]   :      1950           Procedure Date: 2023     Document: [id number]

## 2023-01-13 NOTE — OP NOTE
OPERATIVE REPORT  1/13/2023    PREOPERATIVE DIAGNOSIS:    1. Prostate cancer    POSTOPERATIVE DIAGNOSIS: Same as above    PROCEDURES PERFORMED:   1. Transrectal ultrasound-guided placement of fiducial markers and SpaceOAR.    STAFF SURGEON: Dr. Danile Henley MD    RESIDENT(S):  Dennis Vieira MD    ANESTHESIA: General    ESTIMATED BLOOD LOSS: 1 mL.     DRAINS: None    SPECIMEN(S): None    OPERATIVE INDICATIONS:   Jordan Kennedy is a(n) 72 year old male with a recent diagnosis of prostate cancer.  The patient has elected external beam radiation therapy as his management strategy.  The patient presents today for transrectal ultrasound-guided placement of SpaceOAR fiducial markers in preparation for his radiation therapy.    DESCRIPTION OF PROCEDURE:   After informed consent was obtained, the patient was brought to the operating room, where he was administered general anesthesia with an LMA.  He was placed in lithotomy position with all pressure points padded.  He was administered preoperative antibiotics.  He was prepped and draped in standard sterile fashion. There was difficulty obtaining a suitable level of anesthesia with an LMA so decision made to intubate which was complicated by aspiration event requiring bronchoscopy (please see anesthesia note). He remained stable and we were able to proceed with the rest of the procedure.  Next, the transrectal ultrasound probe was lubricated and placed into the patient's rectum, allowing good visualization of the prostate.  Then under transrectal ultrasound guidance, we advanced the needles containing the fiducial markers into the prostate. We placed 1 marker in the right base, 1 at the right apex and 1 at the left base.  These were all placed without difficulty.  We then used a finder needle, which was advanced under transrectal ultrasound guidance into the space between the prostate and the rectum.  After a confirmatory puff of saline, 10 mL of SpaceOAR  hydrogel was injected in this space, resulting in an excellent lift of the prostate off the rectum.  This was confirmed in both the sagittal and axial views.  The puncture sites were subsequently dressed with bacitracin, gauze and Tegaderm.  The patient was awakened brought to the recovery room in stable condition.    Plan  - Ok for discharge when meeting all PACU criteria  - No urology follow up needed    Dennsi Vieira MD  Urology Resident

## 2023-01-13 NOTE — ANESTHESIA POSTPROCEDURE EVALUATION
Patient: Jordan Kennedy    Procedure: Procedure(s):  Transrectal ultrasound guided placement of fiducials and SpaceOar       Anesthesia Type:  General    Note:  Anesthesia Post Evaluation    Last vitals:  Vitals Value Taken Time   /88 01/13/23 0920   Temp 36.3  C (97.3  F) 01/13/23 0910   Pulse 93 01/13/23 0925   Resp     SpO2 98 % 01/13/23 0925   Vitals shown include unvalidated device data.    Electronically Signed By: Dave Alfredo MD  January 13, 2023  9:26 AM

## 2023-01-13 NOTE — OR NURSING
Upon arrival, pt tachycardic into the 130's-140's. Pt endorsed anxiety r/t procedure. After settling, pt tachycardic at rest, 100-110's w/ bursts of SVT w/ associated O2 desaturation; self-resolving. Pt tachypneic, RR 30's. Lung sounds diminished at bases. Pt denies cardiac history or feeling SOB. 12 lead EKG ordered and MDA notified.

## 2023-01-13 NOTE — ANESTHESIA POSTPROCEDURE EVALUATION
Patient: Jordan Kennedy    Procedure: Procedure(s):  Transrectal ultrasound guided placement of fiducials and SpaceOar       Anesthesia Type:  General    Note:  Disposition: Outpatient   Postop Pain Control: Uneventful            Sign Out: Well controlled pain   PONV: No   Neuro/Psych: Uneventful            Sign Out: Acceptable/Baseline neuro status   Airway/Respiratory: Uneventful            Sign Out: O2 supplementation; Acceptable/Baseline resp. status               Oxygen: Face mask   CV/Hemodynamics: Uneventful            Sign Out: Acceptable CV status; No obvious hypovolemia; No obvious fluid overload   Other NRE: NONE   DID A NON-ROUTINE EVENT OCCUR? No           Last vitals:  Vitals Value Taken Time   /88 01/13/23 0920   Temp 36.3  C (97.3  F) 01/13/23 0910   Pulse 92 01/13/23 0924   Resp     SpO2 96 % 01/13/23 0924   Vitals shown include unvalidated device data.    Electronically Signed By: Dave Alfredo MD  January 13, 2023  9:25 AM

## 2023-01-13 NOTE — ANESTHESIA CARE TRANSFER NOTE
Patient: Jordan Kennedy    Procedure: Procedure(s):  Transrectal ultrasound guided placement of fiducials and SpaceOar       Diagnosis: Prostate cancer (H) [C61]  Diagnosis Additional Information: No value filed.    Anesthesia Type:   General     Note:    Oropharynx: oropharynx clear of all foreign objects and spontaneously breathing  Level of Consciousness: awake  Oxygen Supplementation: face mask  Level of Supplemental Oxygen (L/min / FiO2): 8  Independent Airway: airway patency satisfactory and stable  Dentition: dentition unchanged  Vital Signs Stable: post-procedure vital signs reviewed and stable  Report to RN Given: handoff report given  Patient transferred to: PACU  Comments: Awake, VSS. No c/o  Handoff Report: Identifed the Patient, Identified the Reponsible Provider, Reviewed the pertinent medical history, Discussed the surgical course, Reviewed Intra-OP anesthesia mangement and issues during anesthesia, Set expectations for post-procedure period and Allowed opportunity for questions and acknowledgement of understanding      Vitals:  Vitals Value Taken Time   /88 01/13/23 1020   Temp 36.3  C (97.3  F) 01/13/23 0910   Pulse 101 01/13/23 1027   Resp     SpO2 96 % 01/13/23 1025   Vitals shown include unvalidated device data.    Electronically Signed By: ROJAS Frankel CRNA  January 13, 2023  12:26 PM

## 2023-01-13 NOTE — ANESTHESIA PREPROCEDURE EVALUATION
Anesthesia Pre-Procedure Evaluation    Patient: Jordan Kennedy   MRN: [id number] : 1950        Procedure : Procedure(s):  Transrectal ultrasound guided placement of fiducials and SpaceOar          Past Medical History:   Diagnosis Date     HLD (hyperlipidemia)      Mild cognitive impairment      Prostate cancer (H) 2022     Seizure disorder (H)       Past Surgical History:   Procedure Laterality Date     PROSTATE BIOPSY  2022      No Known Allergies   Social History     Tobacco Use     Smoking status: Never     Smokeless tobacco: Never   Substance Use Topics     Alcohol use: Yes     Comment: Once in a while      Wt Readings from Last 1 Encounters:   23 84.7 kg (186 lb 11.7 oz)        Anesthesia Evaluation   Pt has had prior anesthetic.     No history of anesthetic complications       ROS/MED HX  ENT/Pulmonary:  - neg pulmonary ROS  (-) tobacco use, asthma and sleep apnea   Neurologic: Comment: Taking Dilantin    (+) no peripheral neuropathy seizures, last seizure: >15 years ago,  (-) no CVA   Cardiovascular: Comment: Sinus rhythm   Inferior infarct , age undetermined   Anterior infarct , age undetermined   Abnormal ECG     (+) Dyslipidemia -----    METS/Exercise Tolerance: 4 - Raking leaves, gardening    Hematologic:  - neg hematologic  ROS  (-) history of blood clots and history of blood transfusion   Musculoskeletal: Comment: knee  (+) arthritis,     GI/Hepatic:    (-) GERD and liver disease   Renal/Genitourinary:  - neg Renal ROS  (-) renal disease   Endo:  - neg endo ROS  (-) Type II DM   Psychiatric/Substance Use:  - neg psychiatric ROS     Infectious Disease: Comment: Hx rheumatic fever in childhood      Malignancy:   (+) Malignancy, History of Prostate.Prostate CA Active status post.        Other:  - neg other ROS          Physical Exam    Airway        Mallampati: II   TM distance: > 3 FB   Neck ROM: full   Mouth opening: > 3 cm    Respiratory Devices and Support         Dental   no notable dental history         Cardiovascular   cardiovascular exam normal          Pulmonary   pulmonary exam normal                OUTSIDE LABS:  CBC:   Lab Results   Component Value Date    WBC 6.8 12/30/2022    WBC 4.0 03/24/2011    HGB 17.0 12/30/2022    HGB 16.4 03/24/2011    HCT 49.0 12/30/2022    HCT 49.9 03/24/2011     12/30/2022     03/24/2011     BMP:   Lab Results   Component Value Date     12/30/2022     11/10/2021    POTASSIUM 4.5 12/30/2022    POTASSIUM 4.5 11/10/2021    CHLORIDE 104 12/30/2022    CHLORIDE 108 11/10/2021    CO2 27 12/30/2022    CO2 22 11/10/2021    BUN 18.6 12/30/2022    BUN 19 11/10/2021    CR 0.86 12/30/2022    CR 0.88 11/10/2021     (H) 12/30/2022     (H) 11/10/2021     COAGS: No results found for: PTT, INR, FIBR  POC: No results found for: BGM, HCG, HCGS  HEPATIC:   Lab Results   Component Value Date    ALBUMIN 4.2 11/10/2021    PROTTOTAL 7.6 11/10/2021    ALT 26 11/10/2021    AST 12 11/10/2021    ALKPHOS 168 (H) 11/10/2021    BILITOTAL 0.5 11/10/2021     OTHER:   Lab Results   Component Value Date    A1C 5.3 03/24/2011    NILDA 9.9 12/30/2022    TSH 0.570 03/07/2008       Anesthesia Plan    ASA Status:  3      Anesthesia Type: General.     - Airway: ETT   Induction: Intravenous.   Maintenance: Balanced.        Consents    Anesthesia Plan(s) and associated risks, benefits, and realistic alternatives discussed. Questions answered and patient/representative(s) expressed understanding.     - Discussed: Risks, Benefits and Alternatives for BOTH SEDATION and the PROCEDURE were discussed     - Discussed with:  Patient      - Extended Intubation/Ventilatory Support Discussed: No.      - Patient is DNR/DNI Status: No    Use of blood products discussed: No .     Postoperative Care    Pain management: IV analgesics.   PONV prophylaxis: Ondansetron (or other 5HT-3)     Comments:                Dave Alfredo MD

## 2023-01-25 ENCOUNTER — PATIENT OUTREACH (OUTPATIENT)
Dept: RADIATION ONCOLOGY | Facility: CLINIC | Age: 73
End: 2023-01-25
Payer: MEDICARE

## 2023-01-25 DIAGNOSIS — C61 PROSTATE CANCER (H): Primary | ICD-10-CM

## 2023-01-25 NOTE — PROGRESS NOTES
See [employer] message dated 1/25/2023 for full details, received return call from patient's brother Vika.  Vika reports preference to cancel appointment on 1/26/2023 and coordinate all appointments for same day with MRI on 2/17/2023.    Patient scheduled at [facility] on Friday, 2/17/2023 at:    1130 - arrival for MRI, main floor, check-in desk B  1330 - Return visit with Dr. Martinez (review treatment plan and sign consent)  1445 - CT Simulation for radiation treatment planning    Patient had fiducials and space OAR placed by Dr. Henley on 1/13/2023.    Vika reports patient has been taking daily Metamucil and will continue on current regimen.  Vika also confirmed bladder prep for CT Simulation and daily treatments and confirms he will bring Gas-X to clinic as well on 2/17/2023.  Vika had no questions at this time and Dr. Martinez updated regarding schedule change.    Cora Interiano, RN BSN OCN CBCN

## 2023-02-17 ENCOUNTER — OFFICE VISIT (OUTPATIENT)
Dept: RADIATION ONCOLOGY | Facility: CLINIC | Age: 73
End: 2023-02-17
Payer: MEDICARE

## 2023-02-17 ENCOUNTER — APPOINTMENT (OUTPATIENT)
Dept: RADIATION ONCOLOGY | Facility: CLINIC | Age: 73
End: 2023-02-17
Payer: MEDICARE

## 2023-02-17 ENCOUNTER — ANCILLARY PROCEDURE (OUTPATIENT)
Dept: MRI IMAGING | Facility: CLINIC | Age: 73
End: 2023-02-17
Attending: SURGERY
Payer: MEDICARE

## 2023-02-17 VITALS
SYSTOLIC BLOOD PRESSURE: 130 MMHG | RESPIRATION RATE: 18 BRPM | BODY MASS INDEX: 28.46 KG/M2 | WEIGHT: 181.7 LBS | HEART RATE: 102 BPM | OXYGEN SATURATION: 94 % | DIASTOLIC BLOOD PRESSURE: 84 MMHG | TEMPERATURE: 97.4 F

## 2023-02-17 DIAGNOSIS — C61 PROSTATE CANCER (H): ICD-10-CM

## 2023-02-17 DIAGNOSIS — C61 PROSTATE CANCER (H): Primary | ICD-10-CM

## 2023-02-17 DIAGNOSIS — G40.909 SEIZURE DISORDER (H): ICD-10-CM

## 2023-02-17 PROCEDURE — 77334 RADIATION TREATMENT AID(S): CPT | Performed by: SURGERY

## 2023-02-17 PROCEDURE — 77263 THER RADIOLOGY TX PLNG CPLX: CPT | Performed by: SURGERY

## 2023-02-17 PROCEDURE — G1010 CDSM STANSON: HCPCS | Performed by: RADIOLOGY

## 2023-02-17 PROCEDURE — 72195 MRI PELVIS W/O DYE: CPT | Mod: MG | Performed by: RADIOLOGY

## 2023-02-17 PROCEDURE — 99215 OFFICE O/P EST HI 40 MIN: CPT | Mod: 25 | Performed by: SURGERY

## 2023-02-17 ASSESSMENT — PAIN SCALES - GENERAL: PAINLEVEL: NO PAIN (0)

## 2023-02-17 NOTE — PROGRESS NOTES
Department of Radiation Oncology  [facility] Center  [facility]  [address] [address] [place] [address] [id number] [phone]       Follow up Note    Name: Jordna Kennedy MRN: [id number]   : 1950   Date of Service: 23 Referring: Dr. Ferreira     Reason for consultation: high risk prostate cancer    History of Present Illness     Mr. Kennedy is a 72 year old male with high risk prostate cancer, cT1c, PSA 27.4, GS 3+4=7.    Briefly, his oncologic history is as follows:    The patient was initially referred to urology following an elevated PSA of 22.4 on 11/10/21. GEORGE was unremarkable.    He was referred to Dr. Villeda for further evaluation.  MRI was obtained on 2022 demonstrating a PI-RADS 5 abnormality in the right greater than left apex and mid gland measuring up to 3.4 cm in size, with possible suspicion of extraprostatic extension.  There are indeterminate pelvic lymph nodes, with no convincing bone lesions, and no evidence of seminal vesicle invasion.  Prostate volume was 31 cc.  Biopsy was performed on 2022 with pathology returning as 3+4 = 7 in 2/2 cores in the targeted lesion, with remainder of the cores negative for malignancy.  At that time patient elected no further treatment.     PSA was repeated on a 2022 now elevated to 27.4.  Given the elevation, patient was more interested in treatment options, and discussed the possibility of surgery, the patient was hesitant to pursue surgical options.  That she was referred to Dr. Ferreira for discussion of radiation therapy options.  He was recommended RT to pelvis along with long term hormone, given high risk prostate cancer.He was referred to [place] given that he lives closer to this location.    PET PSMA 10/26/22 was negative for any regional or distant disease.     Interval history:  Patient had Lupron given on 22. Patient had fiducials and space OAR placed by   Kale on 1/13/2023. MRI obtained on 2/17/23. No further changes GI/ symptoms.      Symptoms:  Utility score: 80  Continence: 0 pads used per day with a bother score of 1  Potency/erectile function score: 4 with a bother score of1  IPSS score: 8/35  Quality of life due to urinary symptoms: 0  FELISA score: N/A    PSA Levels:    Date  PSA      PSA   Date Value Ref Range Status   04/15/2013 3.26 0 - 4 ug/L Final   03/07/2008 1.920 ng/mL    03/07/2008 1.920 ng/mL      Prostate Specific Antigen Screen   Date Value Ref Range Status   11/10/2021 22.40 (H) 0.00 - 4.00 ug/L Final     PSA Tumor Marker   Date Value Ref Range Status   08/11/2022 27.80 (H) 0.00 - 4.00 ug/L Final       PSA DT: 45 months    PSA Density: 0.88    Life Expectancy: 12.59 years    Sujit Risks: Organ confined 52%, extracapsular extension 34%, seminal vesicle invasion 8%, lymph node involvement 5%        Past Medical History:   Past Medical History:   Diagnosis Date     HLD (hyperlipidemia)      Mild cognitive impairment      Prostate cancer (H) 02/02/2022     Seizure disorder (H)        Past Surgical History:   Past Surgical History:   Procedure Laterality Date     PROSTATE BIOPSY  02/02/2022     TRANSRECTAL ULTRASONIC SONOGRAM N/A 1/13/2023    Procedure: Transrectal ultrasound guided placement of fiducials and SpaceOar;  Surgeon: Daniel Henley MD;  Location: [facility] OR       Chemotherapy History:  No prior chemotherapy    Radiation History:  No prior radiation  Pregnant: Not Applicable  Implanted Cardiac Devices: No    Medications:  Current Outpatient Medications   Medication     lovastatin (MEVACOR) 40 MG tablet     phenytoin (DILANTIN) 100 MG ER capsule     multivitamin w/minerals (MULTI-VITAMIN) tablet     No current facility-administered medications for this visit.         Allergies:   No Known Allergies    Social History:  Tobacco: Non-smoker  Alcohol: No alcohol  Employment: Retired      Family History:  Family History    Problem Relation Age of Onset     Breast Cancer Mother      Prostate Cancer Father      Anesthesia Reaction No family hx of      Deep Vein Thrombosis (DVT) No family hx of        Review of Systems   A 10-point review of systems was performed. Pertinent findings are noted in the HPI.    Physical Exam   ECOG Status: 2    Vitals:  /84   Pulse 102   Temp 97.4  F (36.3  C) (Oral)   Resp 18   Wt 82.4 kg (181 lb 11.2 oz)   SpO2 94%   BMI 28.46 kg/m      Gen: Alert, in NAD  Head: NC/AT  Eyes: PERRL, EOMI, sclera anicteric  Ears: No external auricular lesions  Nose/sinus: No rhinorrhea or epistaxis  Oral cavity/oropharynx: MMM, no visible oral cavity lesions  Neck: Full ROM, supple, no palpable adenopathy  Pulm: No wheezing, stridor or respiratory distress  CV: Extremities are warm and well-perfused, no cyanosis, no pedal edema  Abdominal: Normal bowel sounds, soft, nontender, no masses  : deferred   Musculoskeletal: Normal bulk and tone  Skin: Normal color and turgor  Neuro: A/Ox3, CN II-XII intact, normal gait    Imaging/Path/Labs   Imaging: per HPI, personally reviewed and in agreement     Path: per HPI, personally reviewed and in agreement     Labs: per HPI, personally reviewed and in agreement     Assessment      Mr. Kennedy is a 72 year old male with high risk prostate cancer, cT1c, PSA 27.4, GS 3+4=7. PET/PSMA negative for regional or distant disease.    We discussed the management of high/very-high risk prostate cancer per NCCN guidelines.  In patients with a life expectancy greater than 5 years, options include external beam with LT-ADT (1.5-3 years) or radical prostatectomy with pelvic lymph node dissection. In patient with life expectancy less than 5 years, options include observation, or AT, or EBRT alone.    We discussed options of radiation particularly external beam radiation.  We discussed treating patient with radiation alone with a hypofractionated approach (preferred) with 70.2 Gy/26  fractions.  Although his lymph node risk is is 5% on Sujit Tables, he does haves some indeterminate lymph nodes. Thus, likely  target would  be the prostate gland and proximal seminal vesicles along with treating the lymph nodes.  Given that patient is high risk, LT-ADT (1.5-3 years) is recommended. Further, we discussed that in general ADT offers a disease-free survival and overall survival benefit in intermediate unfavorable and high risk prostate cancer patients (EORTC 97128 Trial, DART Trial). We discussed the a potential added cardiovascular risk with ADT, potentially greatest in older patients and/or in patients significant pre-existing cardiovascular risks (D'Amico et al. JCO 2007). However, this remains controversial and there is data to support no increased cardiovascular death risk with ADT (Ellison et al., JOSEPH 2011). Thus, the use of ADT must be tailored to individual patient's comorbidities.    We also discussed the general side effects and differences between radiation and surgery:     Side Effects of Radiation Therapy   The main side effects of radiation therapy for prostate cancer are irritation/injury to the rectum/bladder and erectile dysfunction.  Radiation side effects, similar to surgery, are age dependent.  Furthermore, radiation side effects are dependent on the area being treated, the total dose of radiation, and whether or not additional treatment (hormone therapy, previous surgery, etc.) is/was given.   Side effects of external beam radiation may include diarrhea and colitis (irritated intestines).  Occasionally, normal bowel function does not return after treatment is stopped.  Both during and after treatment, other side effects may include frequent urination, urge incontinence (feeling like you have to urinate all the time), burning sensation while urinating, and blood in the urine.  Less than 5% of men report problems with urinary incontinence, but this may increase over time as the  effects of radiation can increase.   Radiation therapy may also cause a feeling of fatigue, which may persist for a few months after treatment stops.  About 30% to 60% of men who receive external beam radiation develop impotence.  Impotence usually does not occur right after radiation therapy but gradually develops over one or more years.  Eventually, the rate of impotence may equal that of surgery (Kaela PROST-QA, [facility] 2008; Elmo, ProtecT Trial QOL, [facility] 2016).  Side Effects of Prostate Surgery   The main side effects of radical prostatectomy are incontinence and impotence and are both age dependent as well as type of surgery performed.   Normal bladder control usually returns within several weeks or months after radical prostatectomy.  Passing a small amount of urine, when coughing, laughing, sneezing, or exercising, may persist permanently after prostatectomy in up to 35% of men.  Some patients (between 2% and 5%) have more serious stress incontinence, which may be permanent.   During the first 3 to 12 months after radical prostatectomy, most men will have erectile dysfunction and will need to use medicines or other treatments if they wish to have an erection.  The effect of this operation on a man's ability to achieve an erection is related to the patient's age and whether nerve-sparing surgery was done.  Nearly all men who have a radical prostatectomy should expect some permanent decrease in their ability to have an erection, but younger men may expect to retain more of their ability.  If the surgeon does not remove the nerves on either side of the prostate during prostatectomy, the impotence rate is between 25% and 30% for men under 60.  But it occurs in 70% to 80% of men over 70, even if nerves on both sides are not removed.  By contrast, after standard radical prostatectomy (in which the nerves are removed), almost all men will become impotent, depending on their age.  Side effects are reported less often  among men treated at major cancer centers, where this type of surgery is performed on a more routine basis.  Specific questions/concerns were referred back to the urology oncology team to address further (Kaela BETH-QA, [facility] 2008; Elmo, ProtecT Trial QOL, [facility] 2016).    Plan     1. After discussion, patient wishes to proceed with LT ADT and RT to prostate and regional lymph nodes.     2.  Recommend 70.2Gy/26fx with lymph nodes. Plan for ADT for 1.5- 3 years. He had fiducial placement and Space OAR placement. MRI obtained 2/17/23 for radiation planning.     3. Consent obtained today. CT simulation was performed today, with plan to start RT in the near future.      All benefits and risks discussed, and patient is in agreement with the oncologic plan discussed above.     Thank you for allowing me to participate in your patient's care.  If you should require any additional information, please do not hesitate in contacting me.     Teja Martinez MD  Department of Radiation Oncology  [facility]

## 2023-02-17 NOTE — NURSING NOTE
"Oncology Rooming Note    February 17, 2023 2:01 PM   Jordan Kennedy is a 72 year old male who presents for:    Chief Complaint   Patient presents with     Radiation Therapy     Return appointment with Dr. Martinez     Initial Vitals: /84   Pulse 102   Temp 97.4  F (36.3  C) (Oral)   Resp 18   Wt 82.4 kg (181 lb 11.2 oz)   SpO2 94%   BMI 28.46 kg/m   Estimated body mass index is 28.46 kg/m  as calculated from the following:    Height as of 1/13/23: 1.702 m (5' 7\").    Weight as of this encounter: 82.4 kg (181 lb 11.2 oz). Body surface area is 1.97 meters squared.  No Pain (0) Comment: Data Unavailable   No LMP for male patient.  Allergies reviewed: Yes  Medications reviewed: Yes    Medications: Medication refills not needed today.  Pharmacy name entered into [employer]: [facility] [address] [place] [address]    Clinical concerns: Return appointment, consent and CT/SIM Dr. Martinez was notified.      Roselyn Vazquez RN              "

## 2023-02-17 NOTE — TELEPHONE ENCOUNTER
Reason for Call:  Medication or medication refill:    Do you use a [facility]?  Name of the pharmacy and phone number for the current request:  [facility] [address]    Name of the medication requested: Phenytoin(Dilantin)    Other request: Patient came into the clinic today with his brother. Patient is completley out of his medication. Has been out for a couple of days. Patient has an appointment with Dr Strange on 2/27/23.  Asking for a markie refill until he can see Dr Strange.    Can we leave a detailed message on this number? YES    Phone number patient can be reached at: Home number on file [phone] (home)    Best Time: any    Call taken on 2/17/2023 at 3:44 PM by Jacquelyn Forrester

## 2023-02-17 NOTE — LETTER
2023         RE: Jordan Kennedy   [address] [facility] [address]        Dear Colleague,    Thank you for referring your patient, Jordan Kennedy, to the [facility] RADIATION [facility]COLOG[facility] [place]. Please see a copy of my visit note below.       Department of Radiation Oncology  [facility]  [facility]  [address] [address] [place] [id number]  [phone]       Follow up Note    Name: Jordan Kennedy MRN: [id number]   : 1950   Date of Service: 23 Referring: Dr. Ferreira     Reason for consultation: high risk prostate cancer    History of Present Illness     Mr. Kennedy is a 72 year old male with high risk prostate cancer, cT1c, PSA 27.4, GS 3+4=7.    Briefly, his oncologic history is as follows:    The patient was initially referred to urology following an elevated PSA of 22.4 on 11/10/21. GEORGE was unremarkable.    He was referred to Dr. Villeda for further evaluation.  MRI was obtained on 2022 demonstrating a PI-RADS 5 abnormality in the right greater than left apex and mid gland measuring up to 3.4 cm in size, with possible suspicion of extraprostatic extension.  There are indeterminate pelvic lymph nodes, with no convincing bone lesions, and no evidence of seminal vesicle invasion.  Prostate volume was 31 cc.  Biopsy was performed on 2022 with pathology returning as 3+4 = 7 in 2/2 cores in the targeted lesion, with remainder of the cores negative for malignancy.  At that time patient elected no further treatment.     PSA was repeated on a 2022 now elevated to 27.4.  Given the elevation, patient was more interested in treatment options, and discussed the possibility of surgery, the patient was hesitant to pursue surgical options.  That she was referred to Dr. Ferreira for discussion of radiation therapy options.  He was recommended RT to pelvis along with long term hormone, given high  risk prostate cancer.He was referred to [place] given that he lives closer to this location.    PET PSMA 10/26/22 was negative for any regional or distant disease.     Interval history:  Patient had Lupron given on 12/5/22. Patient had fiducials and space OAR placed by Dr. Henley on 1/13/2023. MRI obtained on 2/17/23. No further changes GI/ symptoms.      Symptoms:  Utility score: 80  Continence: 0 pads used per day with a bother score of 1  Potency/erectile function score: 4 with a bother score of1  IPSS score: 8/35  Quality of life due to urinary symptoms: 0  FELISA score: N/A    PSA Levels:    Date  PSA      PSA   Date Value Ref Range Status   04/15/2013 3.26 0 - 4 ug/L Final   03/07/2008 1.920 ng/mL    03/07/2008 1.920 ng/mL      Prostate Specific Antigen Screen   Date Value Ref Range Status   11/10/2021 22.40 (H) 0.00 - 4.00 ug/L Final     PSA Tumor Marker   Date Value Ref Range Status   08/11/2022 27.80 (H) 0.00 - 4.00 ug/L Final       PSA DT: 45 months    PSA Density: 0.88    Life Expectancy: 12.59 years    Sujit Risks: Organ confined 52%, extracapsular extension 34%, seminal vesicle invasion 8%, lymph node involvement 5%        Past Medical History:   Past Medical History:   Diagnosis Date     HLD (hyperlipidemia)      Mild cognitive impairment      Prostate cancer (H) 02/02/2022     Seizure disorder (H)        Past Surgical History:   Past Surgical History:   Procedure Laterality Date     PROSTATE BIOPSY  02/02/2022     TRANSRECTAL ULTRASONIC SONOGRAM N/A 1/13/2023    Procedure: Transrectal ultrasound guided placement of fiducials and SpaceOar;  Surgeon: Daniel Henley MD;  Location: [facility] OR       Chemotherapy History:  No prior chemotherapy    Radiation History:  No prior radiation  Pregnant: Not Applicable  Implanted Cardiac Devices: No    Medications:  Current Outpatient Medications   Medication     lovastatin (MEVACOR) 40 MG tablet     phenytoin (DILANTIN) 100 MG ER capsule      multivitamin w/minerals (MULTI-VITAMIN) tablet     No current facility-administered medications for this visit.         Allergies:   No Known Allergies    Social History:  Tobacco: Non-smoker  Alcohol: No alcohol  Employment: Retired      Family History:  Family History   Problem Relation Age of Onset     Breast Cancer Mother      Prostate Cancer Father      Anesthesia Reaction No family hx of      Deep Vein Thrombosis (DVT) No family hx of        Review of Systems   A 10-point review of systems was performed. Pertinent findings are noted in the HPI.    Physical Exam   ECOG Status: 2    Vitals:  /84   Pulse 102   Temp 97.4  F (36.3  C) (Oral)   Resp 18   Wt 82.4 kg (181 lb 11.2 oz)   SpO2 94%   BMI 28.46 kg/m      Gen: Alert, in NAD  Head: NC/AT  Eyes: PERRL, EOMI, sclera anicteric  Ears: No external auricular lesions  Nose/sinus: No rhinorrhea or epistaxis  Oral cavity/oropharynx: MMM, no visible oral cavity lesions  Neck: Full ROM, supple, no palpable adenopathy  Pulm: No wheezing, stridor or respiratory distress  CV: Extremities are warm and well-perfused, no cyanosis, no pedal edema  Abdominal: Normal bowel sounds, soft, nontender, no masses  : deferred   Musculoskeletal: Normal bulk and tone  Skin: Normal color and turgor  Neuro: A/Ox3, CN II-XII intact, normal gait    Imaging/Path/Labs   Imaging: per HPI, personally reviewed and in agreement     Path: per HPI, personally reviewed and in agreement     Labs: per HPI, personally reviewed and in agreement     Assessment      Mr. Kennedy is a 72 year old male with high risk prostate cancer, cT1c, PSA 27.4, GS 3+4=7. PET/PSMA negative for regional or distant disease.    We discussed the management of high/very-high risk prostate cancer per NCCN guidelines.  In patients with a life expectancy greater than 5 years, options include external beam with LT-ADT (1.5-3 years) or radical prostatectomy with pelvic lymph node dissection. In patient  with life expectancy less than 5 years, options include observation, or AT, or EBRT alone.    We discussed options of radiation particularly external beam radiation.  We discussed treating patient with radiation alone with a hypofractionated approach (preferred) with 70.2 Gy/26 fractions.  Although his lymph node risk is is 5% on Sujit Tables, he does haves some indeterminate lymph nodes. Thus, likely  target would  be the prostate gland and proximal seminal vesicles along with treating the lymph nodes.  Given that patient is high risk, LT-ADT (1.5-3 years) is recommended. Further, we discussed that in general ADT offers a disease-free survival and overall survival benefit in intermediate unfavorable and high risk prostate cancer patients (EORTC 89804 Trial, DART Trial). We discussed the a potential added cardiovascular risk with ADT, potentially greatest in older patients and/or in patients significant pre-existing cardiovascular risks (D'Amico et al. JCO 2007). However, this remains controversial and there is data to support no increased cardiovascular death risk with ADT (Ellison et al., JOSEPH 2011). Thus, the use of ADT must be tailored to individual patient's comorbidities.    We also discussed the general side effects and differences between radiation and surgery:     Side Effects of Radiation Therapy   The main side effects of radiation therapy for prostate cancer are irritation/injury to the rectum/bladder and erectile dysfunction.  Radiation side effects, similar to surgery, are age dependent.  Furthermore, radiation side effects are dependent on the area being treated, the total dose of radiation, and whether or not additional treatment (hormone therapy, previous surgery, etc.) is/was given.   Side effects of external beam radiation may include diarrhea and colitis (irritated intestines).  Occasionally, normal bowel function does not return after treatment is stopped.  Both during and after treatment, other  side effects may include frequent urination, urge incontinence (feeling like you have to urinate all the time), burning sensation while urinating, and blood in the urine.  Less than 5% of men report problems with urinary incontinence, but this may increase over time as the effects of radiation can increase.   Radiation therapy may also cause a feeling of fatigue, which may persist for a few months after treatment stops.  About 30% to 60% of men who receive external beam radiation develop impotence.  Impotence usually does not occur right after radiation therapy but gradually develops over one or more years.  Eventually, the rate of impotence may equal that of surgery (Kaela PROST-QA, [facility] 2008; Elmo, ProtecT Trial QOL, [facility] 2016).  Side Effects of Prostate Surgery   The main side effects of radical prostatectomy are incontinence and impotence and are both age dependent as well as type of surgery performed.   Normal bladder control usually returns within several weeks or months after radical prostatectomy.  Passing a small amount of urine, when coughing, laughing, sneezing, or exercising, may persist permanently after prostatectomy in up to 35% of men.  Some patients (between 2% and 5%) have more serious stress incontinence, which may be permanent.   During the first 3 to 12 months after radical prostatectomy, most men will have erectile dysfunction and will need to use medicines or other treatments if they wish to have an erection.  The effect of this operation on a man's ability to achieve an erection is related to the patient's age and whether nerve-sparing surgery was done.  Nearly all men who have a radical prostatectomy should expect some permanent decrease in their ability to have an erection, but younger men may expect to retain more of their ability.  If the surgeon does not remove the nerves on either side of the prostate during prostatectomy, the impotence rate is between 25% and 30% for men under 60.   But it occurs in 70% to 80% of men over 70, even if nerves on both sides are not removed.  By contrast, after standard radical prostatectomy (in which the nerves are removed), almost all men will become impotent, depending on their age.  Side effects are reported less often among men treated at [facility] cancer centers, where this type of surgery is performed on a more routine basis.  Specific questions/concerns were referred back to the urology oncology team to address further (Kaela PROST-QA, [facility] 2008; Elmo, ProtecT Trial QOL, [facility] 2016).    Plan     1. After discussion, patient wishes to proceed with LT ADT and RT to prostate and regional lymph nodes.     2.  Recommend 70.2Gy/26fx with lymph nodes. Plan for ADT for 1.5- 3 years. He had fiducial placement and Space OAR placement. MRI obtained 2/17/23 for radiation planning.     3. Consent obtained today. CT simulation was performed today, with plan to start RT in the near future.      All benefits and risks discussed, and patient is in agreement with the oncologic plan discussed above.     Thank you for allowing me to participate in your patient's care.  If you should require any additional information, please do not hesitate in contacting me.     Teja Martinez MD  Department of Radiation Oncology  [facility]       Again, thank you for allowing me to participate in the care of your patient.        Sincerely,        Teja Martinez MD

## 2023-02-20 DIAGNOSIS — E78.5 HYPERLIPIDEMIA LDL GOAL <130: ICD-10-CM

## 2023-02-20 DIAGNOSIS — G40.909 SEIZURE DISORDER (H): ICD-10-CM

## 2023-02-20 RX ORDER — LOVASTATIN 40 MG
40 TABLET ORAL AT BEDTIME
Qty: 90 TABLET | Refills: 0 | Status: SHIPPED | OUTPATIENT
Start: 2023-02-20 | End: 2023-02-27

## 2023-02-20 RX ORDER — PHENYTOIN SODIUM 100 MG/1
100 CAPSULE, EXTENDED RELEASE ORAL DAILY
Qty: 90 CAPSULE | Refills: 0 | Status: CANCELLED | OUTPATIENT
Start: 2023-02-20

## 2023-02-20 RX ORDER — PHENYTOIN SODIUM 100 MG/1
100 CAPSULE, EXTENDED RELEASE ORAL DAILY
Qty: 90 CAPSULE | Refills: 0 | Status: SHIPPED | OUTPATIENT
Start: 2023-02-20 | End: 2023-02-27
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
## 2023-02-27 ENCOUNTER — LAB (OUTPATIENT)
Dept: FAMILY MEDICINE | Facility: CLINIC | Age: 73
End: 2023-02-27

## 2023-02-27 ENCOUNTER — OFFICE VISIT (OUTPATIENT)
Dept: FAMILY MEDICINE | Facility: CLINIC | Age: 73
End: 2023-02-27
Payer: MEDICARE

## 2023-02-27 VITALS
BODY MASS INDEX: 27.62 KG/M2 | HEART RATE: 101 BPM | WEIGHT: 176 LBS | SYSTOLIC BLOOD PRESSURE: 114 MMHG | OXYGEN SATURATION: 96 % | DIASTOLIC BLOOD PRESSURE: 74 MMHG | RESPIRATION RATE: 20 BRPM | TEMPERATURE: 97.4 F | HEIGHT: 67 IN

## 2023-02-27 DIAGNOSIS — Z12.11 SCREEN FOR COLON CANCER: ICD-10-CM

## 2023-02-27 DIAGNOSIS — Z12.11 SCREEN FOR COLON CANCER: Primary | ICD-10-CM

## 2023-02-27 DIAGNOSIS — E78.5 HYPERLIPIDEMIA LDL GOAL <130: ICD-10-CM

## 2023-02-27 DIAGNOSIS — C61 PROSTATE CANCER (H): ICD-10-CM

## 2023-02-27 DIAGNOSIS — G40.909 SEIZURE DISORDER (H): ICD-10-CM

## 2023-02-27 LAB
BASOPHILS # BLD AUTO: 0 10E3/UL (ref 0–0.2)
BASOPHILS NFR BLD AUTO: 1 %
EOSINOPHIL # BLD AUTO: 0.1 10E3/UL (ref 0–0.7)
EOSINOPHIL NFR BLD AUTO: 1 %
ERYTHROCYTE [DISTWIDTH] IN BLOOD BY AUTOMATED COUNT: 12.5 % (ref 10–15)
HCT VFR BLD AUTO: 46.3 % (ref 40–53)
HGB BLD-MCNC: 15.7 G/DL (ref 13.3–17.7)
LYMPHOCYTES # BLD AUTO: 2.9 10E3/UL (ref 0.8–5.3)
LYMPHOCYTES NFR BLD AUTO: 47 %
MCH RBC QN AUTO: 31.9 PG (ref 26.5–33)
MCHC RBC AUTO-ENTMCNC: 33.9 G/DL (ref 31.5–36.5)
MCV RBC AUTO: 94 FL (ref 78–100)
MONOCYTES # BLD AUTO: 0.8 10E3/UL (ref 0–1.3)
MONOCYTES NFR BLD AUTO: 13 %
NEUTROPHILS # BLD AUTO: 2.4 10E3/UL (ref 1.6–8.3)
NEUTROPHILS NFR BLD AUTO: 39 %
PHENYTOIN SERPL-MCNC: 3 UG/ML
PLATELET # BLD AUTO: 213 10E3/UL (ref 150–450)
RBC # BLD AUTO: 4.92 10E6/UL (ref 4.4–5.9)
WBC # BLD AUTO: 6.2 10E3/UL (ref 4–11)

## 2023-02-27 PROCEDURE — 36415 COLL VENOUS BLD VENIPUNCTURE: CPT | Performed by: FAMILY MEDICINE

## 2023-02-27 PROCEDURE — 80053 COMPREHEN METABOLIC PANEL: CPT | Performed by: FAMILY MEDICINE

## 2023-02-27 PROCEDURE — 80061 LIPID PANEL: CPT | Performed by: FAMILY MEDICINE

## 2023-02-27 PROCEDURE — 80185 ASSAY OF PHENYTOIN TOTAL: CPT | Performed by: FAMILY MEDICINE

## 2023-02-27 PROCEDURE — 85025 COMPLETE CBC W/AUTO DIFF WBC: CPT | Performed by: FAMILY MEDICINE

## 2023-02-27 PROCEDURE — 99214 OFFICE O/P EST MOD 30 MIN: CPT | Performed by: FAMILY MEDICINE

## 2023-02-27 RX ORDER — PHENYTOIN SODIUM 100 MG/1
100 CAPSULE, EXTENDED RELEASE ORAL DAILY
Qty: 90 CAPSULE | Refills: 3 | Status: SHIPPED | OUTPATIENT
Start: 2023-02-27 | End: 2024-02-08

## 2023-02-27 RX ORDER — LOVASTATIN 40 MG
40 TABLET ORAL AT BEDTIME
Qty: 90 TABLET | Refills: 3 | Status: SHIPPED | OUTPATIENT
Start: 2023-02-27 | End: 2024-02-08

## 2023-02-27 ASSESSMENT — PAIN SCALES - GENERAL: PAINLEVEL: NO PAIN (0)

## 2023-02-27 NOTE — PROGRESS NOTES
"  Assessment & Plan     Screen for colon cancer  screening  - JASPER([employer]); Future    Hyperlipidemia LDL goal <130  Continue with current medications.  - lovastatin (MEVACOR) 40 MG tablet; Take 1 tablet (40 mg) by mouth At Bedtime  - Lipid panel reflex to direct LDL Fasting; Future  - Comprehensive metabolic panel (BMP + Alb, Alk Phos, ALT, AST, Total. Bili, TP); Future  - CBC with platelets and differential; Future  - CBC with platelets and differential  - Comprehensive metabolic panel (BMP + Alb, Alk Phos, ALT, AST, Total. Bili, TP)  - Lipid panel reflex to direct LDL Fasting    Seizure disorder (H)  Remain seizure free,   Continue with current medications.  - phenytoin (DILANTIN) 100 MG ER capsule; Take 1 capsule (100 mg) by mouth daily  - Phenytoin level; Future  - Phenytoin level    Prostate cancer (H)  Follow up with Urology  Currently, going thru Radiation therapy.    Declined vaccinations today     BMI:   Estimated body mass index is 27.57 kg/m  as calculated from the following:    Height as of this encounter: 1.702 m (5' 7\").    Weight as of this encounter: 79.8 kg (176 lb).   Weight management plan: Discussed healthy diet and exercise guidelines        Return in about 1 year (around 2/27/2024) for Routine preventive, in person.    Michelle Ortega is a 72 year old presenting for the following health issues:  Recheck Medication (Cholesterol check)  History of seizure, he is on phenytoin for many years, has not had seizure for over 20+ years.  History of dyslipidemia he is on lovastatin.    History of prostate cancer he does follow-up with urology, currently he is doing radiation therapy      Declined vaccinations today    History of Present Illness       Reason for visit:  Annual vist maintain medications    He eats 0-1 servings of fruits and vegetables daily.He consumes 1 sweetened beverage(s) daily.He exercises with enough effort to increase his heart rate 10 to 19 minutes per day.  He " "exercises with enough effort to increase his heart rate 5 days per week.   He is taking medications regularly.         Review of Systems   Constitutional, HEENT, cardiovascular, pulmonary, GI, , musculoskeletal, neuro, skin, endocrine and psych systems are negative, except as otherwise noted.      Objective    /74 (BP Location: Right arm, Patient Position: Chair, Cuff Size: Adult Regular)   Pulse 101   Temp 97.4  F (36.3  C) (Tympanic)   Resp 20   Ht 1.702 m (5' 7\")   Wt 79.8 kg (176 lb)   SpO2 96%   BMI 27.57 kg/m    Body mass index is 27.57 kg/m .  Physical Exam   GENERAL: healthy, alert and no distress  NECK: no adenopathy, no asymmetry, masses, or scars and thyroid normal to palpation  RESP: lungs clear to auscultation - no rales, rhonchi or wheezes  CV: regular rate and rhythm, normal S1 S2, no S3 or S4, no murmur, click or rub, no peripheral edema and peripheral pulses strong  ABDOMEN: soft, nontender, no hepatosplenomegaly, no masses and bowel sounds normal  MS: no gross musculoskeletal defects noted, no edema  PSYCH: mentation appears normal, affect normal/bright    Orders Placed This Encounter   Procedures     REVIEW OF HEALTH MAINTENANCE PROTOCOL ORDERS     Lipid panel reflex to direct LDL Fasting     Comprehensive metabolic panel (BMP + Alb, Alk Phos, ALT, AST, Total. Bili, TP)     Phenytoin level     CBC with platelets and differential     CBC with platelets and differential     Mehreen Strange MD            "

## 2023-02-28 ENCOUNTER — TELEPHONE (OUTPATIENT)
Dept: FAMILY MEDICINE | Facility: CLINIC | Age: 73
End: 2023-02-28
Payer: MEDICARE

## 2023-02-28 LAB
ALBUMIN SERPL BCG-MCNC: 4.6 G/DL (ref 3.5–5.2)
ALP SERPL-CCNC: 146 U/L (ref 40–129)
ALT SERPL W P-5'-P-CCNC: 24 U/L (ref 10–50)
ANION GAP SERPL CALCULATED.3IONS-SCNC: 16 MMOL/L (ref 7–15)
AST SERPL W P-5'-P-CCNC: 24 U/L (ref 10–50)
BILIRUB SERPL-MCNC: 0.4 MG/DL
BUN SERPL-MCNC: 23.4 MG/DL (ref 8–23)
CALCIUM SERPL-MCNC: 10.2 MG/DL (ref 8.8–10.2)
CHLORIDE SERPL-SCNC: 107 MMOL/L (ref 98–107)
CHOLEST SERPL-MCNC: 180 MG/DL
CREAT SERPL-MCNC: 0.85 MG/DL (ref 0.67–1.17)
DEPRECATED HCO3 PLAS-SCNC: 21 MMOL/L (ref 22–29)
GFR SERPL CREATININE-BSD FRML MDRD: >90 ML/MIN/1.73M2
GLUCOSE SERPL-MCNC: 117 MG/DL (ref 70–99)
HDLC SERPL-MCNC: 48 MG/DL
LDLC SERPL CALC-MCNC: 107 MG/DL
NONHDLC SERPL-MCNC: 132 MG/DL
POTASSIUM SERPL-SCNC: 4.3 MMOL/L (ref 3.4–5.3)
PROT SERPL-MCNC: 7.4 G/DL (ref 6.4–8.3)
SODIUM SERPL-SCNC: 144 MMOL/L (ref 136–145)
TRIGL SERPL-MCNC: 126 MG/DL

## 2023-02-28 NOTE — TELEPHONE ENCOUNTER
----- Message from Mehreen Strange MD sent at 2/28/2023  6:51 AM CST -----  Please call patient with his result.  Normal for CBC, blood count.  Normal for phenytoin level.  Normal for liver function, kidney function.    1 year annual exam follow-up recommended

## 2023-02-28 NOTE — TELEPHONE ENCOUNTER
Attempt #1 to call patient.     RN left voicemail and requested return call to [facility] at [phone].     Nehal Perez RN  [facility]: [place]

## 2023-03-01 NOTE — TELEPHONE ENCOUNTER
RN called brotherVika.     Consent to communicate on file.    RN relayed provider's note and Vika verbalized understanding.    Cyn Martins RN

## 2023-03-07 ENCOUNTER — APPOINTMENT (OUTPATIENT)
Dept: RADIATION ONCOLOGY | Facility: CLINIC | Age: 73
End: 2023-03-07
Payer: MEDICARE

## 2023-03-07 PROCEDURE — 77301 RADIOTHERAPY DOSE PLAN IMRT: CPT | Performed by: SURGERY

## 2023-03-07 PROCEDURE — 77338 DESIGN MLC DEVICE FOR IMRT: CPT | Performed by: SURGERY

## 2023-03-07 PROCEDURE — 77300 RADIATION THERAPY DOSE PLAN: CPT | Performed by: SURGERY

## 2023-03-08 ENCOUNTER — APPOINTMENT (OUTPATIENT)
Dept: RADIATION ONCOLOGY | Facility: CLINIC | Age: 73
End: 2023-03-08
Payer: MEDICARE

## 2023-03-08 PROCEDURE — 77014 PR CT GUIDE FOR PLACEMENT RADIATION THERAPY FIELDS: CPT | Performed by: SURGERY

## 2023-03-09 ENCOUNTER — APPOINTMENT (OUTPATIENT)
Dept: RADIATION ONCOLOGY | Facility: CLINIC | Age: 73
End: 2023-03-09
Payer: MEDICARE

## 2023-03-10 ENCOUNTER — APPOINTMENT (OUTPATIENT)
Dept: RADIATION ONCOLOGY | Facility: CLINIC | Age: 73
End: 2023-03-10
Payer: MEDICARE

## 2023-03-10 PROCEDURE — 77385 PR IMRT TREATMENT DELIVERY, SIMPLE: CPT | Performed by: SURGERY

## 2023-03-10 PROCEDURE — 77014 PR CT GUIDE FOR PLACEMENT RADIATION THERAPY FIELDS: CPT | Performed by: SURGERY

## 2023-03-13 ENCOUNTER — APPOINTMENT (OUTPATIENT)
Dept: RADIATION ONCOLOGY | Facility: CLINIC | Age: 73
End: 2023-03-13
Payer: MEDICARE

## 2023-03-13 PROCEDURE — 77385 PR IMRT TREATMENT DELIVERY, SIMPLE: CPT | Performed by: RADIOLOGY

## 2023-03-13 PROCEDURE — 77014 PR CT GUIDE FOR PLACEMENT RADIATION THERAPY FIELDS: CPT | Performed by: RADIOLOGY

## 2023-03-14 ENCOUNTER — APPOINTMENT (OUTPATIENT)
Dept: RADIATION ONCOLOGY | Facility: CLINIC | Age: 73
End: 2023-03-14
Payer: MEDICARE

## 2023-03-14 PROCEDURE — 77385 PR IMRT TREATMENT DELIVERY, SIMPLE: CPT | Performed by: SURGERY

## 2023-03-14 PROCEDURE — 77014 PR CT GUIDE FOR PLACEMENT RADIATION THERAPY FIELDS: CPT | Performed by: SURGERY

## 2023-03-15 ENCOUNTER — OFFICE VISIT (OUTPATIENT)
Dept: RADIATION ONCOLOGY | Facility: CLINIC | Age: 73
End: 2023-03-15
Payer: MEDICARE

## 2023-03-15 ENCOUNTER — APPOINTMENT (OUTPATIENT)
Dept: RADIATION ONCOLOGY | Facility: CLINIC | Age: 73
End: 2023-03-15
Payer: MEDICARE

## 2023-03-15 VITALS
HEART RATE: 100 BPM | SYSTOLIC BLOOD PRESSURE: 129 MMHG | OXYGEN SATURATION: 93 % | TEMPERATURE: 98.2 F | RESPIRATION RATE: 20 BRPM | WEIGHT: 182 LBS | BODY MASS INDEX: 28.51 KG/M2 | DIASTOLIC BLOOD PRESSURE: 89 MMHG

## 2023-03-15 DIAGNOSIS — C61 PROSTATE CANCER (H): Primary | ICD-10-CM

## 2023-03-15 PROCEDURE — 77385 PR IMRT TREATMENT DELIVERY, SIMPLE: CPT | Performed by: SURGERY

## 2023-03-15 PROCEDURE — 77014 PR CT GUIDE FOR PLACEMENT RADIATION THERAPY FIELDS: CPT | Performed by: SURGERY

## 2023-03-15 PROCEDURE — 99207 PR DROP WITH A PROCEDURE: CPT | Performed by: SURGERY

## 2023-03-15 ASSESSMENT — PAIN SCALES - GENERAL: PAINLEVEL: NO PAIN (0)

## 2023-03-15 NOTE — PATIENT INSTRUCTIONS
Please contact [facility] RN with questions or concerns following today's appointment: [phone].    Thank you!

## 2023-03-15 NOTE — LETTER
3/15/2023         RE: Jordan Kennedy   [address] [facility] [address]        Dear Colleague,    Thank you for referring your patient, Jordan Kennedy, to the [facility] RADIATION ONCOLOGY [place]. Please see a copy of my visit note below.    .    [facility] PHYSICIANS  SPECIALIZING IN BREAKTHROUGHS  Radiation Oncology    On Treatment Visit Note      Jordan Kennedy      Date: Mar 15, 2023   MRN: [id number]   : 1950  Diagnosis: prostate cancer        ID: Mr. Kennedy is a 72 year old male with high risk prostate cancer, cT1c, PSA 27.4, GS 3+4=7. PET/PSMA negative for regional or distant disease.    Reason for Visit:  On Radiation Treatment Visit       Treatment Summary to Date  Treatment Site: pelvis Current Dose: 1080/7020 cGy Fractions:       Chemotherapy  Chemo concurrent with radx?: No    Subjective:   No complaints, tolerating RT well overall.    Nursing ROS:   Nutrition Alteration  Diet Type: Patient's Preference  Skin  Skin Reaction: 0 - No changes        Cardiovascular  Respiratory effort: 2 - Mild dyspnea on exertion  Gastrointestinal  Nausea: 0 - None  Diarrhea: 0 - None  GI Note: patient taking Metamucil po and Gas-X po during course of radiation treatment  Genitourinary  Urinary Status: 0 - Normal  Psychosocial  Mood - Anxiety: 0 - Normal  Mood - Depression: 0 - Normal  Psychosocial Note: energy level at baseline  Pain Assessment  0-10 Pain Scale: 0      Objective:   /89 (BP Location: Left arm, Patient Position: Chair, Cuff Size: Adult Regular)   Pulse 100   Temp 98.2  F (36.8  C) (Oral)   Resp 20   Wt 82.6 kg (182 lb)   SpO2 93%   BMI 28.51 kg/m    Gen: Appears well, in no acute distress  Skin: No erythema  CV/Resp: rrr, breathing comfortably on room air  Neuro: CN 2-12 grossly intact, UE/LE full strength     Labs:  CBC RESULTS: Recent Labs   Lab Test 23  1639   WBC 6.2   RBC 4.92   HGB 15.7   HCT 46.3   MCV 94   MCH 31.9  Metoprolol tartrate 12.5 BID, EF has been improving  Continue present medical therapy  - comfort care as of 12/7     MCHC 33.9   RDW 12.5        ELECTROLYTES:  Recent Labs   Lab Test 02/27/23  1639      POTASSIUM 4.3   CHLORIDE 107   NILDA 10.2   CO2 21*   BUN 23.4*   CR 0.85   *       Assessment:    Tolerating radiation therapy well.  All questions and concerns addressed.      Plan:   1. Continue current therapy.        Mosaiq chart and setup information reviewed  Ports checked and MVCT/IGRT images checked    Medication Review  Med list reviewed with patient?: Yes  Med list printed and given: Offered and declined    Educational Topic Discussed  Education Instructions: radiation therapy side effects: fatigue, skin changes and skin cares, hair loss, nausea/vomiting, diarrhea, urinary and bladder changes    Teja Martinez MD  Radiation Oncology   [facility]: [phone]          Again, thank you for allowing me to participate in the care of your patient.        Sincerely,        Teja Martinez MD

## 2023-03-15 NOTE — PROGRESS NOTES
[facility] PHYSICIANS  SPECIALIZING IN BREAKTHROUGHS  Radiation Oncology    On Treatment Visit Note      Jordan Kennedy      Date: Mar 15, 2023   MRN: [id number]   : 1950  Diagnosis: prostate cancer        ID: Mr. Kennedy is a 72 year old male with high risk prostate cancer, cT1c, PSA 27.4, GS 3+4=7. PET/PSMA negative for regional or distant disease.    Reason for Visit:  On Radiation Treatment Visit       Treatment Summary to Date  Treatment Site: pelvis Current Dose: 1080/7020 cGy Fractions:       Chemotherapy  Chemo concurrent with radx?: No    Subjective:   No complaints, tolerating RT well overall.    Nursing ROS:   Nutrition Alteration  Diet Type: Patient's Preference  Skin  Skin Reaction: 0 - No changes        Cardiovascular  Respiratory effort: 2 - Mild dyspnea on exertion  Gastrointestinal  Nausea: 0 - None  Diarrhea: 0 - None  GI Note: patient taking Metamucil po and Gas-X po during course of radiation treatment  Genitourinary  Urinary Status: 0 - Normal  Psychosocial  Mood - Anxiety: 0 - Normal  Mood - Depression: 0 - Normal  Psychosocial Note: energy level at baseline  Pain Assessment  0-10 Pain Scale: 0      Objective:   /89 (BP Location: Left arm, Patient Position: Chair, Cuff Size: Adult Regular)   Pulse 100   Temp 98.2  F (36.8  C) (Oral)   Resp 20   Wt 82.6 kg (182 lb)   SpO2 93%   BMI 28.51 kg/m    Gen: Appears well, in no acute distress  Skin: No erythema  CV/Resp: rrr, breathing comfortably on room air  Neuro: CN 2-12 grossly intact, UE/LE full strength     Labs:  CBC RESULTS: Recent Labs   Lab Test 23  1639   WBC 6.2   RBC 4.92   HGB 15.7   HCT 46.3   MCV 94   MCH 31.9   MCHC 33.9   RDW 12.5        ELECTROLYTES:  Recent Labs   Lab Test 23  1639      POTASSIUM 4.3   CHLORIDE 107   NILDA 10.2   CO2 21*   BUN 23.4*   CR 0.85   *       Assessment:    Tolerating radiation therapy well.  All questions and concerns  addressed.      Plan:   1. Continue current therapy.        Mosaiq chart and setup information reviewed  Ports checked and MVCT/IGRT images checked    Medication Review  Med list reviewed with patient?: Yes  Med list printed and given: Offered and declined    Educational Topic Discussed  Education Instructions: radiation therapy side effects: fatigue, skin changes and skin cares, hair loss, nausea/vomiting, diarrhea, urinary and bladder changes    Teja Martinez MD  Radiation Oncology   [facility]  Clinic: [phone]

## 2023-03-16 ENCOUNTER — APPOINTMENT (OUTPATIENT)
Dept: RADIATION ONCOLOGY | Facility: CLINIC | Age: 73
End: 2023-03-16
Payer: MEDICARE

## 2023-03-16 PROCEDURE — 77336 RADIATION PHYSICS CONSULT: CPT | Performed by: SURGERY

## 2023-03-16 PROCEDURE — 77385 PR IMRT TREATMENT DELIVERY, SIMPLE: CPT | Performed by: SURGERY

## 2023-03-16 PROCEDURE — 77014 PR CT GUIDE FOR PLACEMENT RADIATION THERAPY FIELDS: CPT | Performed by: SURGERY

## 2023-03-16 PROCEDURE — 77427 RADIATION TX MANAGEMENT X5: CPT | Performed by: SURGERY

## 2023-03-17 ENCOUNTER — APPOINTMENT (OUTPATIENT)
Dept: RADIATION ONCOLOGY | Facility: CLINIC | Age: 73
End: 2023-03-17
Payer: MEDICARE

## 2023-03-17 PROCEDURE — 77385 PR IMRT TREATMENT DELIVERY, SIMPLE: CPT | Performed by: SURGERY

## 2023-03-17 PROCEDURE — 77014 PR CT GUIDE FOR PLACEMENT RADIATION THERAPY FIELDS: CPT | Performed by: SURGERY

## 2023-03-19 LAB — NONINV COLON CA DNA+OCC BLD SCRN STL QL: NORMAL

## 2023-03-20 ENCOUNTER — APPOINTMENT (OUTPATIENT)
Dept: RADIATION ONCOLOGY | Facility: CLINIC | Age: 73
End: 2023-03-20
Payer: MEDICARE

## 2023-03-20 PROCEDURE — 77385 PR IMRT TREATMENT DELIVERY, SIMPLE: CPT | Performed by: RADIOLOGY

## 2023-03-20 PROCEDURE — 77014 PR CT GUIDE FOR PLACEMENT RADIATION THERAPY FIELDS: CPT | Performed by: RADIOLOGY

## 2023-03-21 ENCOUNTER — APPOINTMENT (OUTPATIENT)
Dept: RADIATION ONCOLOGY | Facility: CLINIC | Age: 73
End: 2023-03-21
Payer: MEDICARE

## 2023-03-21 PROCEDURE — 77385 PR IMRT TREATMENT DELIVERY, SIMPLE: CPT | Performed by: SURGERY

## 2023-03-21 PROCEDURE — 77014 PR CT GUIDE FOR PLACEMENT RADIATION THERAPY FIELDS: CPT | Performed by: SURGERY

## 2023-03-22 ENCOUNTER — OFFICE VISIT (OUTPATIENT)
Dept: RADIATION ONCOLOGY | Facility: CLINIC | Age: 73
End: 2023-03-22
Payer: MEDICARE

## 2023-03-22 ENCOUNTER — APPOINTMENT (OUTPATIENT)
Dept: RADIATION ONCOLOGY | Facility: CLINIC | Age: 73
End: 2023-03-22
Payer: MEDICARE

## 2023-03-22 VITALS
DIASTOLIC BLOOD PRESSURE: 78 MMHG | TEMPERATURE: 97.6 F | HEART RATE: 111 BPM | WEIGHT: 182.4 LBS | RESPIRATION RATE: 18 BRPM | BODY MASS INDEX: 28.57 KG/M2 | OXYGEN SATURATION: 93 % | SYSTOLIC BLOOD PRESSURE: 125 MMHG

## 2023-03-22 DIAGNOSIS — C61 PROSTATE CANCER (H): Primary | ICD-10-CM

## 2023-03-22 PROCEDURE — 77385 PR IMRT TREATMENT DELIVERY, SIMPLE: CPT | Performed by: SURGERY

## 2023-03-22 PROCEDURE — 77014 PR CT GUIDE FOR PLACEMENT RADIATION THERAPY FIELDS: CPT | Performed by: SURGERY

## 2023-03-22 PROCEDURE — 99207 PR DROP WITH A PROCEDURE: CPT | Performed by: SURGERY

## 2023-03-22 ASSESSMENT — PAIN SCALES - GENERAL: PAINLEVEL: NO PAIN (0)

## 2023-03-22 NOTE — LETTER
3/22/2023         RE: Jordan Kennedy   [address] [facility] [address]        Dear Colleague,    Thank you for referring your patient, Jordan Kennedy, to the [facility] RADIATION ONCOLOGY [place]. Please see a copy of my visit note below.    [facility] PHYSICIANS  SPECIALIZING IN BREAKTHROUGHS  Radiation Oncology    On Treatment Visit Note      Jordan Kennedy      Date: Mar 22, 2023   MRN: [id number]   : 1950  Diagnosis: prostate cancer        ID: Mr. Kennedy is a 72 year old male with high risk prostate cancer, cT1c, PSA 27.4, GS 3+4=7. PET/PSMA negative for regional or distant disease.    Reason for Visit:  On Radiation Treatment Visit       Treatment Summary to Date  Treatment Site: pelvis Current Dose: 2430/7020 cGy Fractions: 10/26      Chemotherapy  Chemo concurrent with radx?: No    Subjective:   No complaints, tolerating RT well overall.    Nursing ROS:   Nutrition Alteration  Diet Type: Patient's Preference  Skin  Skin Reaction: 0 - No changes        Cardiovascular  Respiratory effort: 2 - Mild dyspnea on exertion  Gastrointestinal  Nausea: 0 - None  Diarrhea: 0 - None  GI Note: patient taking Metamucil po and Gas-X po during course of radiation treatment  Genitourinary  Urinary Status: 0 - Normal   Note: patient reports getting up 3-4 times per night to urinate  Psychosocial  Mood - Anxiety: 0 - Normal  Mood - Depression: 0 - Normal  Psychosocial Note: energy level at baseline  Pain Assessment  0-10 Pain Scale: 0      Objective:   /78 (BP Location: Left arm, Patient Position: Chair, Cuff Size: Adult Regular)   Pulse 111   Temp 97.6  F (36.4  C) (Oral)   Resp 18   Wt 82.7 kg (182 lb 6.4 oz)   SpO2 93%   BMI 28.57 kg/m    Gen: Appears well, in no acute distress  Skin: No erythema  CV/Resp: rrr, breathing comfortably on room air  Neuro: CN 2-12 grossly intact, UE/LE full strength     Labs:  CBC RESULTS: Recent Labs   Lab Test  02/27/23  1639   WBC 6.2   RBC 4.92   HGB 15.7   HCT 46.3   MCV 94   MCH 31.9   MCHC 33.9   RDW 12.5        ELECTROLYTES:  Recent Labs   Lab Test 02/27/23  1639      POTASSIUM 4.3   CHLORIDE 107   NILDA 10.2   CO2 21*   BUN 23.4*   CR 0.85   *       Assessment:    Tolerating radiation therapy well.  All questions and concerns addressed.      Plan:   1. Continue current therapy.        Mosaiq chart and setup information reviewed  Ports checked and MVCT/IGRT images checked    Medication Review  Med list reviewed with patient?: Yes  Med list printed and given: Offered and declined    Educational Topic Discussed  Education Instructions: radiation therapy side effects: fatigue, skin changes and skin cares, hair loss, nausea/vomiting, diarrhea, urinary and bladder changes    Teja Martinez MD  Radiation Oncology   [facility]  Clinic: [phone]          Again, thank you for allowing me to participate in the care of your patient.        Sincerely,        Teja Martinez MD

## 2023-03-22 NOTE — PATIENT INSTRUCTIONS
Please contact [facility] RN with questions or concerns following today's appointment: [phone].    Thank you!

## 2023-03-22 NOTE — PROGRESS NOTES
[facility] PHYSICIANS  SPECIALIZING IN BREAKTHROUGHS  Radiation Oncology    On Treatment Visit Note      Jordan Kennedy      Date: Mar 22, 2023   MRN: [id number]   : 1950  Diagnosis: prostate cancer        ID: Mr. Kennedy is a 72 year old male with high risk prostate cancer, cT1c, PSA 27.4, GS 3+4=7. PET/PSMA negative for regional or distant disease.    Reason for Visit:  On Radiation Treatment Visit       Treatment Summary to Date  Treatment Site: pelvis Current Dose: 2430/7020 cGy Fractions: 10/26      Chemotherapy  Chemo concurrent with radx?: No    Subjective:   No complaints, tolerating RT well overall.    Nursing ROS:   Nutrition Alteration  Diet Type: Patient's Preference  Skin  Skin Reaction: 0 - No changes        Cardiovascular  Respiratory effort: 2 - Mild dyspnea on exertion  Gastrointestinal  Nausea: 0 - None  Diarrhea: 0 - None  GI Note: patient taking Metamucil po and Gas-X po during course of radiation treatment  Genitourinary  Urinary Status: 0 - Normal   Note: patient reports getting up 3-4 times per night to urinate  Psychosocial  Mood - Anxiety: 0 - Normal  Mood - Depression: 0 - Normal  Psychosocial Note: energy level at baseline  Pain Assessment  0-10 Pain Scale: 0      Objective:   /78 (BP Location: Left arm, Patient Position: Chair, Cuff Size: Adult Regular)   Pulse 111   Temp 97.6  F (36.4  C) (Oral)   Resp 18   Wt 82.7 kg (182 lb 6.4 oz)   SpO2 93%   BMI 28.57 kg/m    Gen: Appears well, in no acute distress  Skin: No erythema  CV/Resp: rrr, breathing comfortably on room air  Neuro: CN 2-12 grossly intact, UE/LE full strength     Labs:  CBC RESULTS: Recent Labs   Lab Test 23  1639   WBC 6.2   RBC 4.92   HGB 15.7   HCT 46.3   MCV 94   MCH 31.9   MCHC 33.9   RDW 12.5        ELECTROLYTES:  Recent Labs   Lab Test 23  1639      POTASSIUM 4.3   CHLORIDE 107   NILDA 10.2   CO2 21*   BUN 23.4*   CR 0.85   *       Assessment:     Tolerating radiation therapy well.  All questions and concerns addressed.      Plan:   1. Continue current therapy.        Mosaiq chart and setup information reviewed  Ports checked and MVCT/IGRT images checked    Medication Review  Med list reviewed with patient?: Yes  Med list printed and given: Offered and declined    Educational Topic Discussed  Education Instructions: radiation therapy side effects: fatigue, skin changes and skin cares, hair loss, nausea/vomiting, diarrhea, urinary and bladder changes    Teja Martinez MD  Radiation Oncology   [facility]  Clinic: [phone]

## 2023-03-23 ENCOUNTER — APPOINTMENT (OUTPATIENT)
Dept: RADIATION ONCOLOGY | Facility: CLINIC | Age: 73
End: 2023-03-23
Payer: MEDICARE

## 2023-03-23 PROCEDURE — 77427 RADIATION TX MANAGEMENT X5: CPT | Performed by: SURGERY

## 2023-03-23 PROCEDURE — 77385 PR IMRT TREATMENT DELIVERY, SIMPLE: CPT | Performed by: SURGERY

## 2023-03-23 PROCEDURE — 77336 RADIATION PHYSICS CONSULT: CPT | Performed by: SURGERY

## 2023-03-23 PROCEDURE — 77014 PR CT GUIDE FOR PLACEMENT RADIATION THERAPY FIELDS: CPT | Performed by: SURGERY

## 2023-03-24 ENCOUNTER — APPOINTMENT (OUTPATIENT)
Dept: RADIATION ONCOLOGY | Facility: CLINIC | Age: 73
End: 2023-03-24
Payer: MEDICARE

## 2023-03-24 PROCEDURE — 77014 PR CT GUIDE FOR PLACEMENT RADIATION THERAPY FIELDS: CPT | Performed by: SURGERY

## 2023-03-24 PROCEDURE — 77385 PR IMRT TREATMENT DELIVERY, SIMPLE: CPT | Performed by: SURGERY

## 2023-03-27 ENCOUNTER — APPOINTMENT (OUTPATIENT)
Dept: RADIATION ONCOLOGY | Facility: CLINIC | Age: 73
End: 2023-03-27
Payer: MEDICARE

## 2023-03-27 PROCEDURE — 77385 PR IMRT TREATMENT DELIVERY, SIMPLE: CPT | Performed by: RADIOLOGY

## 2023-03-27 PROCEDURE — 77014 PR CT GUIDE FOR PLACEMENT RADIATION THERAPY FIELDS: CPT | Performed by: RADIOLOGY

## 2023-03-28 ENCOUNTER — APPOINTMENT (OUTPATIENT)
Dept: RADIATION ONCOLOGY | Facility: CLINIC | Age: 73
End: 2023-03-28
Payer: MEDICARE

## 2023-03-28 PROCEDURE — 77385 PR IMRT TREATMENT DELIVERY, SIMPLE: CPT | Performed by: SURGERY

## 2023-03-28 PROCEDURE — 77014 PR CT GUIDE FOR PLACEMENT RADIATION THERAPY FIELDS: CPT | Performed by: SURGERY

## 2023-03-29 ENCOUNTER — APPOINTMENT (OUTPATIENT)
Dept: RADIATION ONCOLOGY | Facility: CLINIC | Age: 73
End: 2023-03-29
Payer: MEDICARE

## 2023-03-29 ENCOUNTER — OFFICE VISIT (OUTPATIENT)
Dept: RADIATION ONCOLOGY | Facility: CLINIC | Age: 73
End: 2023-03-29
Payer: MEDICARE

## 2023-03-29 VITALS
DIASTOLIC BLOOD PRESSURE: 78 MMHG | SYSTOLIC BLOOD PRESSURE: 116 MMHG | RESPIRATION RATE: 18 BRPM | TEMPERATURE: 97.4 F | HEART RATE: 109 BPM | WEIGHT: 184.4 LBS | OXYGEN SATURATION: 93 % | BODY MASS INDEX: 28.88 KG/M2

## 2023-03-29 DIAGNOSIS — C61 PROSTATE CANCER (H): Primary | ICD-10-CM

## 2023-03-29 PROCEDURE — 77385 PR IMRT TREATMENT DELIVERY, SIMPLE: CPT | Performed by: SURGERY

## 2023-03-29 PROCEDURE — 77014 PR CT GUIDE FOR PLACEMENT RADIATION THERAPY FIELDS: CPT | Performed by: SURGERY

## 2023-03-29 PROCEDURE — 99207 PR DROP WITH A PROCEDURE: CPT | Performed by: SURGERY

## 2023-03-29 ASSESSMENT — PAIN SCALES - GENERAL: PAINLEVEL: NO PAIN (0)

## 2023-03-29 NOTE — LETTER
3/29/2023         RE: Jordan Kennedy   [address] [facility] [address]        Dear Colleague,    Thank you for referring your patient, Jordan Kennedy, to the [facility] RADIATION ONCOLOGY [place]. Please see a copy of my visit note below.    [facility] PHYSICIANS  SPECIALIZING IN BREAKTHROUGHS  Radiation Oncology    On Treatment Visit Note      Jordan Kennedy      Date: Mar 29, 2023   MRN: [id number]   : 1950  Diagnosis: prostate cancer        ID: Mr. Kennedy is a 72 year old male with high risk prostate cancer, cT1c, PSA 27.4, GS 3+4=7. PET/PSMA negative for regional or distant disease.     Reason for Visit:  On Radiation Treatment Visit       Treatment Summary to Date  Treatment Site: pelvis Current Dose: 3780/7020 cGy Fractions: 15/26      Chemotherapy  Chemo concurrent with radx?: No    Subjective:   No complaints, tolerating RT well overall. Mild fatigue.    Nursing ROS:   Nutrition Alteration  Diet Type: Patient's Preference  Skin  Skin Reaction: 0 - No changes        Cardiovascular  Respiratory effort: 2 - Mild dyspnea on exertion  Gastrointestinal  Nausea: 0 - None  Diarrhea: 0 - None  GI Note: patient taking Metamucil po and Gas-X po during course of radiation treatment  Genitourinary  Urinary Status: 0 - Normal   Note: patient reports getting up 3-4 times per night to urinate  Psychosocial  Mood - Anxiety: 0 - Normal  Mood - Depression: 0 - Normal  Psychosocial Note: energy level at baseline  Pain Assessment  0-10 Pain Scale: 0      Objective:   /78 (BP Location: Left arm, Patient Position: Chair, Cuff Size: Adult Regular)   Pulse 109   Temp 97.4  F (36.3  C) (Oral)   Resp 18   Wt 83.6 kg (184 lb 6.4 oz)   SpO2 93%   BMI 28.88 kg/m    Gen: Appears well, in no acute distress  Skin: No erythema  CV/Resp: rrr, breathing comfortably on room air  Neuro: CN 2-12 grossly intact, UE/LE full strength     Labs:  CBC RESULTS: Recent Labs   Lab  Test 02/27/23  1639   WBC 6.2   RBC 4.92   HGB 15.7   HCT 46.3   MCV 94   MCH 31.9   MCHC 33.9   RDW 12.5        ELECTROLYTES:  Recent Labs   Lab Test 02/27/23  1639      POTASSIUM 4.3   CHLORIDE 107   NILDA 10.2   CO2 21*   BUN 23.4*   CR 0.85   *       Assessment:    Tolerating radiation therapy well.  All questions and concerns addressed.      Plan:   1. Continue current therapy.        Mosaiq chart and setup information reviewed  Ports checked and MVCT/IGRT images checked    Medication Review  Med list reviewed with patient?: Yes  Med list printed and given: Offered and declined    Educational Topic Discussed  Education Instructions: radiation therapy side effects: fatigue, skin changes and skin cares, hair loss, nausea/vomiting, diarrhea, urinary and bladder changes    Teja Martinez MD  Radiation Oncology   [facility]: [phone]          Again, thank you for allowing me to participate in the care of your patient.        Sincerely,        Teja Martinez MD

## 2023-03-29 NOTE — PROGRESS NOTES
[facility] PHYSICIANS  SPECIALIZING IN BREAKTHROUGHS  Radiation Oncology    On Treatment Visit Note      Jordan Kennedy      Date: Mar 29, 2023   MRN: [id number]   : 1950  Diagnosis: prostate cancer        ID: Mr. Kennedy is a 72 year old male with high risk prostate cancer, cT1c, PSA 27.4, GS 3+4=7. PET/PSMA negative for regional or distant disease.     Reason for Visit:  On Radiation Treatment Visit       Treatment Summary to Date  Treatment Site: pelvis Current Dose: 3780/7020 cGy Fractions: 15/26      Chemotherapy  Chemo concurrent with radx?: No    Subjective:   No complaints, tolerating RT well overall. Mild fatigue.    Nursing ROS:   Nutrition Alteration  Diet Type: Patient's Preference  Skin  Skin Reaction: 0 - No changes        Cardiovascular  Respiratory effort: 2 - Mild dyspnea on exertion  Gastrointestinal  Nausea: 0 - None  Diarrhea: 0 - None  GI Note: patient taking Metamucil po and Gas-X po during course of radiation treatment  Genitourinary  Urinary Status: 0 - Normal   Note: patient reports getting up 3-4 times per night to urinate  Psychosocial  Mood - Anxiety: 0 - Normal  Mood - Depression: 0 - Normal  Psychosocial Note: energy level at baseline  Pain Assessment  0-10 Pain Scale: 0      Objective:   /78 (BP Location: Left arm, Patient Position: Chair, Cuff Size: Adult Regular)   Pulse 109   Temp 97.4  F (36.3  C) (Oral)   Resp 18   Wt 83.6 kg (184 lb 6.4 oz)   SpO2 93%   BMI 28.88 kg/m    Gen: Appears well, in no acute distress  Skin: No erythema  CV/Resp: rrr, breathing comfortably on room air  Neuro: CN 2-12 grossly intact, UE/LE full strength     Labs:  CBC RESULTS: Recent Labs   Lab Test 23  1639   WBC 6.2   RBC 4.92   HGB 15.7   HCT 46.3   MCV 94   MCH 31.9   MCHC 33.9   RDW 12.5        ELECTROLYTES:  Recent Labs   Lab Test 23  1639      POTASSIUM 4.3   CHLORIDE 107   NILDA 10.2   CO2 21*   BUN 23.4*   CR 0.85   *        Assessment:    Tolerating radiation therapy well.  All questions and concerns addressed.      Plan:   1. Continue current therapy.        Mosaiq chart and setup information reviewed  Ports checked and MVCT/IGRT images checked    Medication Review  Med list reviewed with patient?: Yes  Med list printed and given: Offered and declined    Educational Topic Discussed  Education Instructions: radiation therapy side effects: fatigue, skin changes and skin cares, hair loss, nausea/vomiting, diarrhea, urinary and bladder changes    Teja Martinez MD  Radiation Oncology   [facility]  Clinic: [phone]

## 2023-03-29 NOTE — PATIENT INSTRUCTIONS
Please contact [facility] RN with questions or concerns following today's appointment: [phone].    Thank you!

## 2023-03-30 ENCOUNTER — APPOINTMENT (OUTPATIENT)
Dept: RADIATION ONCOLOGY | Facility: CLINIC | Age: 73
End: 2023-03-30
Payer: MEDICARE

## 2023-03-30 PROCEDURE — 77385 PR IMRT TREATMENT DELIVERY, SIMPLE: CPT | Performed by: SURGERY

## 2023-03-30 PROCEDURE — 77427 RADIATION TX MANAGEMENT X5: CPT | Performed by: SURGERY

## 2023-03-30 PROCEDURE — 77336 RADIATION PHYSICS CONSULT: CPT | Performed by: SURGERY

## 2023-03-30 PROCEDURE — 77014 PR CT GUIDE FOR PLACEMENT RADIATION THERAPY FIELDS: CPT | Performed by: SURGERY

## 2023-03-31 ENCOUNTER — APPOINTMENT (OUTPATIENT)
Dept: RADIATION ONCOLOGY | Facility: CLINIC | Age: 73
End: 2023-03-31
Payer: MEDICARE

## 2023-03-31 PROCEDURE — 77385 PR IMRT TREATMENT DELIVERY, SIMPLE: CPT | Performed by: SURGERY

## 2023-03-31 PROCEDURE — 77014 PR CT GUIDE FOR PLACEMENT RADIATION THERAPY FIELDS: CPT | Performed by: SURGERY

## 2023-04-03 ENCOUNTER — APPOINTMENT (OUTPATIENT)
Dept: RADIATION ONCOLOGY | Facility: CLINIC | Age: 73
End: 2023-04-03
Payer: MEDICARE

## 2023-04-03 PROCEDURE — 77014 PR CT GUIDE FOR PLACEMENT RADIATION THERAPY FIELDS: CPT | Performed by: RADIOLOGY

## 2023-04-03 PROCEDURE — 77385 PR IMRT TREATMENT DELIVERY, SIMPLE: CPT | Performed by: RADIOLOGY

## 2023-04-04 ENCOUNTER — APPOINTMENT (OUTPATIENT)
Dept: RADIATION ONCOLOGY | Facility: CLINIC | Age: 73
End: 2023-04-04
Payer: MEDICARE

## 2023-04-04 PROCEDURE — 77385 PR IMRT TREATMENT DELIVERY, SIMPLE: CPT | Performed by: RADIOLOGY

## 2023-04-04 PROCEDURE — 77014 PR CT GUIDE FOR PLACEMENT RADIATION THERAPY FIELDS: CPT | Performed by: RADIOLOGY

## 2023-04-05 ENCOUNTER — APPOINTMENT (OUTPATIENT)
Dept: RADIATION ONCOLOGY | Facility: CLINIC | Age: 73
End: 2023-04-05
Payer: MEDICARE

## 2023-04-05 ENCOUNTER — OFFICE VISIT (OUTPATIENT)
Dept: RADIATION ONCOLOGY | Facility: CLINIC | Age: 73
End: 2023-04-05
Payer: MEDICARE

## 2023-04-05 VITALS
DIASTOLIC BLOOD PRESSURE: 75 MMHG | RESPIRATION RATE: 18 BRPM | BODY MASS INDEX: 28.57 KG/M2 | WEIGHT: 182.4 LBS | HEART RATE: 115 BPM | TEMPERATURE: 97.3 F | SYSTOLIC BLOOD PRESSURE: 113 MMHG | OXYGEN SATURATION: 93 %

## 2023-04-05 DIAGNOSIS — C61 PROSTATE CANCER (H): Primary | ICD-10-CM

## 2023-04-05 PROCEDURE — 77014 PR CT GUIDE FOR PLACEMENT RADIATION THERAPY FIELDS: CPT | Performed by: SURGERY

## 2023-04-05 PROCEDURE — 77385 PR IMRT TREATMENT DELIVERY, SIMPLE: CPT | Performed by: SURGERY

## 2023-04-05 PROCEDURE — 99207 PR DROP WITH A PROCEDURE: CPT | Performed by: SURGERY

## 2023-04-05 ASSESSMENT — PAIN SCALES - GENERAL: PAINLEVEL: NO PAIN (0)

## 2023-04-05 NOTE — LETTER
2023         RE: Jordan Kennedy   [address] [facility] [address]        Dear Colleague,    Thank you for referring your patient, Jordan Kennedy, to the [facility] RADIATION ONCOLOGY [place]. Please see a copy of my visit note below.    [facility] PHYSICIANS  SPECIALIZING IN BREAKTHROUGHS  Radiation Oncology    On Treatment Visit Note      Jordan Kennedy      Date: 2023   MRN: [id number]   : 1950  Diagnosis: prostate cancer        ID: Mr. Kennedy is a 72 year old male with high risk prostate cancer, cT1c, PSA 27.4, GS 3+4=7. PET/PSMA negative for regional or distant disease.    Reason for Visit:  On Radiation Treatment Visit       Treatment Summary to Date  Treatment Site: pelvis Current Dose: 5130/7020 cGy Fractions:       Chemotherapy  Chemo concurrent with radx?: No    Subjective:   No complaints, tolerating RT well overall. Mild fatigue.    Nursing ROS:   Nutrition Alteration  Diet Type: Patient's Preference  Skin  Skin Reaction: 0 - No changes        Cardiovascular  Respiratory effort: 2 - Mild dyspnea on exertion  Gastrointestinal  Nausea: 0 - None  Diarrhea: 0 - None  GI Note: patient taking Metamucil po and Gas-X po during course of radiation treatment  Genitourinary  Urinary Status: 0 - Normal   Note: patient reports getting up 3-4 times per night to urinate  Psychosocial  Mood - Anxiety: 0 - Normal  Mood - Depression: 0 - Normal  Psychosocial Note: patient reports slight fatigue  Pain Assessment  0-10 Pain Scale: 0      Objective:   /75 (BP Location: Left arm, Patient Position: Chair, Cuff Size: Adult Regular)   Pulse 115   Temp 97.3  F (36.3  C) (Oral)   Resp 18   Wt 82.7 kg (182 lb 6.4 oz)   SpO2 93%   BMI 28.57 kg/m    Gen: Appears well, in no acute distress  Skin: No erythema  CV/Resp: rrr, breathing comfortably on room air  Neuro: CN 2-12 grossly intact, UE/LE full strength     Labs:  CBC RESULTS: Recent Labs    Lab Test 02/27/23  1639   WBC 6.2   RBC 4.92   HGB 15.7   HCT 46.3   MCV 94   MCH 31.9   MCHC 33.9   RDW 12.5        ELECTROLYTES:  Recent Labs   Lab Test 02/27/23  1639      POTASSIUM 4.3   CHLORIDE 107   NILDA 10.2   CO2 21*   BUN 23.4*   CR 0.85   *       Assessment:    Tolerating radiation therapy well.  All questions and concerns addressed.      Plan:   1. Continue current therapy.        Mosaiq chart and setup information reviewed  Ports checked and MVCT/IGRT images checked    Medication Review  Med list reviewed with patient?: Yes  Med list printed and given: Offered and declined    Educational Topic Discussed  Education Instructions: radiation therapy side effects: fatigue, skin changes and skin cares, hair loss, nausea/vomiting, diarrhea, urinary and bladder changes    Teja Martinez MD  Radiation Oncology   [facility]: [phone]          Again, thank you for allowing me to participate in the care of your patient.        Sincerely,        Teja Martinez MD

## 2023-04-05 NOTE — PROGRESS NOTES
[facility] PHYSICIANS  SPECIALIZING IN BREAKTHROUGHS  Radiation Oncology    On Treatment Visit Note      Jordan Kennedy      Date: 2023   MRN: [id number]   : 1950  Diagnosis: prostate cancer        ID: Mr. Kennedy is a 72 year old male with high risk prostate cancer, cT1c, PSA 27.4, GS 3+4=7. PET/PSMA negative for regional or distant disease.    Reason for Visit:  On Radiation Treatment Visit       Treatment Summary to Date  Treatment Site: pelvis Current Dose: 5130/7020 cGy Fractions:       Chemotherapy  Chemo concurrent with radx?: No    Subjective:   No complaints, tolerating RT well overall. Mild fatigue.    Nursing ROS:   Nutrition Alteration  Diet Type: Patient's Preference  Skin  Skin Reaction: 0 - No changes        Cardiovascular  Respiratory effort: 2 - Mild dyspnea on exertion  Gastrointestinal  Nausea: 0 - None  Diarrhea: 0 - None  GI Note: patient taking Metamucil po and Gas-X po during course of radiation treatment  Genitourinary  Urinary Status: 0 - Normal   Note: patient reports getting up 3-4 times per night to urinate  Psychosocial  Mood - Anxiety: 0 - Normal  Mood - Depression: 0 - Normal  Psychosocial Note: patient reports slight fatigue  Pain Assessment  0-10 Pain Scale: 0      Objective:   /75 (BP Location: Left arm, Patient Position: Chair, Cuff Size: Adult Regular)   Pulse 115   Temp 97.3  F (36.3  C) (Oral)   Resp 18   Wt 82.7 kg (182 lb 6.4 oz)   SpO2 93%   BMI 28.57 kg/m    Gen: Appears well, in no acute distress  Skin: No erythema  CV/Resp: rrr, breathing comfortably on room air  Neuro: CN 2-12 grossly intact, UE/LE full strength     Labs:  CBC RESULTS: Recent Labs   Lab Test 23  1639   WBC 6.2   RBC 4.92   HGB 15.7   HCT 46.3   MCV 94   MCH 31.9   MCHC 33.9   RDW 12.5        ELECTROLYTES:  Recent Labs   Lab Test 23  1639      POTASSIUM 4.3   CHLORIDE 107   NILDA 10.2   CO2 21*   BUN 23.4*   CR 0.85   *        Assessment:    Tolerating radiation therapy well.  All questions and concerns addressed.      Plan:   1. Continue current therapy.        Mosaiq chart and setup information reviewed  Ports checked and MVCT/IGRT images checked    Medication Review  Med list reviewed with patient?: Yes  Med list printed and given: Offered and declined    Educational Topic Discussed  Education Instructions: radiation therapy side effects: fatigue, skin changes and skin cares, hair loss, nausea/vomiting, diarrhea, urinary and bladder changes    Teja Martinez MD  Radiation Oncology   [facility]  Clinic: [phone]

## 2023-04-05 NOTE — PATIENT INSTRUCTIONS
Please contact [facility] RN with questions or concerns following today's appointment: [phone].    Thank you!

## 2023-04-06 ENCOUNTER — APPOINTMENT (OUTPATIENT)
Dept: RADIATION ONCOLOGY | Facility: CLINIC | Age: 73
End: 2023-04-06
Payer: MEDICARE

## 2023-04-06 PROCEDURE — 77385 PR IMRT TREATMENT DELIVERY, SIMPLE: CPT | Performed by: SURGERY

## 2023-04-06 PROCEDURE — 77427 RADIATION TX MANAGEMENT X5: CPT | Performed by: SURGERY

## 2023-04-06 PROCEDURE — 77336 RADIATION PHYSICS CONSULT: CPT | Performed by: SURGERY

## 2023-04-06 PROCEDURE — 77014 PR CT GUIDE FOR PLACEMENT RADIATION THERAPY FIELDS: CPT | Performed by: SURGERY

## 2023-04-10 ENCOUNTER — APPOINTMENT (OUTPATIENT)
Dept: RADIATION ONCOLOGY | Facility: CLINIC | Age: 73
End: 2023-04-10
Payer: MEDICARE

## 2023-04-10 PROCEDURE — 77014 PR CT GUIDE FOR PLACEMENT RADIATION THERAPY FIELDS: CPT | Performed by: RADIOLOGY

## 2023-04-10 PROCEDURE — 77385 PR IMRT TREATMENT DELIVERY, SIMPLE: CPT | Performed by: RADIOLOGY

## 2023-04-11 ENCOUNTER — APPOINTMENT (OUTPATIENT)
Dept: RADIATION ONCOLOGY | Facility: CLINIC | Age: 73
End: 2023-04-11
Payer: MEDICARE

## 2023-04-11 PROCEDURE — 77385 PR IMRT TREATMENT DELIVERY, SIMPLE: CPT | Performed by: RADIOLOGY

## 2023-04-11 PROCEDURE — 77014 PR CT GUIDE FOR PLACEMENT RADIATION THERAPY FIELDS: CPT | Performed by: RADIOLOGY

## 2023-04-12 ENCOUNTER — OFFICE VISIT (OUTPATIENT)
Dept: RADIATION ONCOLOGY | Facility: CLINIC | Age: 73
End: 2023-04-12
Payer: MEDICARE

## 2023-04-12 ENCOUNTER — APPOINTMENT (OUTPATIENT)
Dept: RADIATION ONCOLOGY | Facility: CLINIC | Age: 73
End: 2023-04-12
Payer: MEDICARE

## 2023-04-12 VITALS
TEMPERATURE: 97.8 F | WEIGHT: 182 LBS | RESPIRATION RATE: 18 BRPM | HEART RATE: 108 BPM | BODY MASS INDEX: 28.51 KG/M2 | SYSTOLIC BLOOD PRESSURE: 114 MMHG | DIASTOLIC BLOOD PRESSURE: 74 MMHG | OXYGEN SATURATION: 94 %

## 2023-04-12 DIAGNOSIS — C61 PROSTATE CANCER (H): Primary | ICD-10-CM

## 2023-04-12 PROCEDURE — 77385 PR IMRT TREATMENT DELIVERY, SIMPLE: CPT | Performed by: SURGERY

## 2023-04-12 PROCEDURE — 99207 PR DROP WITH A PROCEDURE: CPT | Performed by: SURGERY

## 2023-04-12 PROCEDURE — 77014 PR CT GUIDE FOR PLACEMENT RADIATION THERAPY FIELDS: CPT | Performed by: SURGERY

## 2023-04-12 ASSESSMENT — PAIN SCALES - GENERAL: PAINLEVEL: NO PAIN (0)

## 2023-04-12 NOTE — PROGRESS NOTES
[facility] PHYSICIANS  SPECIALIZING IN BREAKTHROUGHS  Radiation Oncology    On Treatment Visit Note      Jordan Kennedy      Date: 2023   MRN: [id number]   : 1950  Diagnosis: prostate cancer        ID: Mr. Kennedy is a 72 year old male with high risk prostate cancer, cT1c, PSA 27.4, GS 3+4=7. PET/PSMA negative for regional or distant disease.     Reason for Visit:  On Radiation Treatment Visit       Treatment Summary to Date  Treatment Site: pelvis Current Dose: 6210/7020 cGy Fractions:       Chemotherapy  Chemo concurrent with radx?: No    Subjective:   No complaints, tolerating RT well overall.    Nursing ROS:   Nutrition Alteration  Diet Type: Patient's Preference  Skin  Skin Reaction: 0 - No changes        Cardiovascular  Respiratory effort: 2 - Mild dyspnea on exertion  Gastrointestinal  Nausea: 0 - None  Diarrhea: 0 - None  GI Note: patient taking Metamucil po and Gas-X po during course of radiation treatment  Genitourinary  Urinary Status: 0 - Normal   Note: patient reports getting up 3-4 times per night to urinate  Psychosocial  Mood - Anxiety: 0 - Normal  Mood - Depression: 0 - Normal  Psychosocial Note: patient reports slight fatigue  Pain Assessment  0-10 Pain Scale: 0      Objective:   /74 (BP Location: Left arm, Patient Position: Chair, Cuff Size: Adult Regular)   Pulse 108   Temp 97.8  F (36.6  C) (Oral)   Resp 18   Wt 82.6 kg (182 lb)   SpO2 94%   BMI 28.51 kg/m    Gen: Appears well, in no acute distress  Skin: No erythema  CV/Resp: rrr, breathing comfortably on room air  Neuro: CN 2-12 grossly intact, UE/LE full strength     Labs:  CBC RESULTS: Recent Labs   Lab Test 23  1639   WBC 6.2   RBC 4.92   HGB 15.7   HCT 46.3   MCV 94   MCH 31.9   MCHC 33.9   RDW 12.5        ELECTROLYTES:  Recent Labs   Lab Test 23  1639      POTASSIUM 4.3   CHLORIDE 107   NILDA 10.2   CO2 21*   BUN 23.4*   CR 0.85   *       Assessment:     Tolerating radiation therapy well.  All questions and concerns addressed.      Plan:   1. Continue current therapy.    2. Completes RT next week, follow 1 month, 6 months post RT with PSA      Mosaiq chart and setup information reviewed  Ports checked and MVCT/IGRT images checked    Medication Review  Med list reviewed with patient?: Yes  Med list printed and given: Offered and declined    Educational Topic Discussed  Additional Instructions: follow-up with Dr. Martinez in one month, scheduling will contact patient  Education Instructions: reviewed continued management of radiation side effects, reviewed stopping Gas-X after final radiation treatment and reviewed continuing Metamucil for two weeks after radiation treatment and then okay to stop    Teja Martinez MD  Radiation Oncology   [facility]  Clinic: [phone]

## 2023-04-12 NOTE — PATIENT INSTRUCTIONS
Please contact [facility] RN with questions or concerns following today's appointment: [phone].    Thank you!

## 2023-04-12 NOTE — LETTER
2023         RE: Jordan Kennedy   [address] [facility] [address]        Dear Colleague,    Thank you for referring your patient, Jordan Kennedy, to the [facility] RADIATION ONCOLOGY [place]. Please see a copy of my visit note below.    [facility] PHYSICIANS  SPECIALIZING IN BREAKTHROUGHS  Radiation Oncology    On Treatment Visit Note      Jordan Kennedy      Date: 2023   MRN: [id number]   : 1950  Diagnosis: prostate cancer        ID: Mr. Kennedy is a 72 year old male with high risk prostate cancer, cT1c, PSA 27.4, GS 3+4=7. PET/PSMA negative for regional or distant disease.     Reason for Visit:  On Radiation Treatment Visit       Treatment Summary to Date  Treatment Site: pelvis Current Dose: 6210/7020 cGy Fractions:       Chemotherapy  Chemo concurrent with radx?: No    Subjective:   No complaints, tolerating RT well overall.    Nursing ROS:   Nutrition Alteration  Diet Type: Patient's Preference  Skin  Skin Reaction: 0 - No changes        Cardiovascular  Respiratory effort: 2 - Mild dyspnea on exertion  Gastrointestinal  Nausea: 0 - None  Diarrhea: 0 - None  GI Note: patient taking Metamucil po and Gas-X po during course of radiation treatment  Genitourinary  Urinary Status: 0 - Normal   Note: patient reports getting up 3-4 times per night to urinate  Psychosocial  Mood - Anxiety: 0 - Normal  Mood - Depression: 0 - Normal  Psychosocial Note: patient reports slight fatigue  Pain Assessment  0-10 Pain Scale: 0      Objective:   /74 (BP Location: Left arm, Patient Position: Chair, Cuff Size: Adult Regular)   Pulse 108   Temp 97.8  F (36.6  C) (Oral)   Resp 18   Wt 82.6 kg (182 lb)   SpO2 94%   BMI 28.51 kg/m    Gen: Appears well, in no acute distress  Skin: No erythema  CV/Resp: rrr, breathing comfortably on room air  Neuro: CN 2-12 grossly intact, UE/LE full strength     Labs:  CBC RESULTS: Recent Labs   Lab Test  02/27/23  1639   WBC 6.2   RBC 4.92   HGB 15.7   HCT 46.3   MCV 94   MCH 31.9   MCHC 33.9   RDW 12.5        ELECTROLYTES:  Recent Labs   Lab Test 02/27/23  1639      POTASSIUM 4.3   CHLORIDE 107   NILDA 10.2   CO2 21*   BUN 23.4*   CR 0.85   *       Assessment:    Tolerating radiation therapy well.  All questions and concerns addressed.      Plan:   1. Continue current therapy.    2. Completes RT next week, follow 1 month, 6 months post RT with PSA      Mosaiq chart and setup information reviewed  Ports checked and MVCT/IGRT images checked    Medication Review  Med list reviewed with patient?: Yes  Med list printed and given: Offered and declined    Educational Topic Discussed  Additional Instructions: follow-up with Dr. Martinez in one month, scheduling will contact patient  Education Instructions: reviewed continued management of radiation side effects, reviewed stopping Gas-X after final radiation treatment and reviewed continuing Metamucil for two weeks after radiation treatment and then okay to stop    Teja Martinez MD  Radiation Oncology   [facility]: [phone]          Again, thank you for allowing me to participate in the care of your patient.        Sincerely,        Teja Martinez MD

## 2023-04-13 ENCOUNTER — APPOINTMENT (OUTPATIENT)
Dept: RADIATION ONCOLOGY | Facility: CLINIC | Age: 73
End: 2023-04-13
Payer: MEDICARE

## 2023-04-13 PROCEDURE — 77385 PR IMRT TREATMENT DELIVERY, SIMPLE: CPT | Performed by: SURGERY

## 2023-04-13 PROCEDURE — 77014 PR CT GUIDE FOR PLACEMENT RADIATION THERAPY FIELDS: CPT | Performed by: SURGERY

## 2023-04-14 ENCOUNTER — APPOINTMENT (OUTPATIENT)
Dept: RADIATION ONCOLOGY | Facility: CLINIC | Age: 73
End: 2023-04-14
Payer: MEDICARE

## 2023-04-14 PROCEDURE — 77336 RADIATION PHYSICS CONSULT: CPT | Performed by: SURGERY

## 2023-04-14 PROCEDURE — 77014 PR CT GUIDE FOR PLACEMENT RADIATION THERAPY FIELDS: CPT | Performed by: SURGERY

## 2023-04-14 PROCEDURE — 77385 PR IMRT TREATMENT DELIVERY, SIMPLE: CPT | Performed by: SURGERY

## 2023-04-14 PROCEDURE — 77427 RADIATION TX MANAGEMENT X5: CPT | Performed by: SURGERY

## 2023-04-17 ENCOUNTER — DOCUMENTATION ONLY (OUTPATIENT)
Dept: RADIATION ONCOLOGY | Facility: CLINIC | Age: 73
End: 2023-04-17

## 2023-04-17 ENCOUNTER — APPOINTMENT (OUTPATIENT)
Dept: RADIATION ONCOLOGY | Facility: CLINIC | Age: 73
End: 2023-04-17
Payer: MEDICARE

## 2023-04-17 DIAGNOSIS — C61 PROSTATE CANCER (H): Primary | ICD-10-CM

## 2023-04-17 PROCEDURE — 77014 PR CT GUIDE FOR PLACEMENT RADIATION THERAPY FIELDS: CPT | Performed by: RADIOLOGY

## 2023-04-17 PROCEDURE — 77385 PR IMRT TREATMENT DELIVERY, SIMPLE: CPT | Performed by: RADIOLOGY

## 2023-05-12 NOTE — PROGRESS NOTES
Radiotherapy Treatment Summary              PATIENT: Jordan Kennedy  MEDICAL RECORD NO: [id number]   : 1950    DIAGNOSIS / ID: Mr. Kennedy is a 72 year old male with high risk prostate cancer, cT1c, PSA 27.4, GS 3+4=7. PET/PSMA negative for regional or distant disease.     INTENT OF RADIOTHERAPY: Definitive     CONCURRENT SYSTEMIC THERAPY: Yes            SITE OF TREATMENT: Prostate and lymph nodes     DATES  OF TREATMENT: 3/9/23- 23    TOTAL DOSE OF TREATMENT / FRACTIONS: 70.2Gy/26fx                                         FOLLOW UP PLAN:  1. Follow up with Radiation Oncology in 1 month  2. PSA 6 months post completion of radiation therapy     CC  Patient Care Team:  Mehreen Strange MD as PCP - General (Family Medicine)  Mehreen Strange MD as Assigned PCP  Ami Villeda MD as Assigned Surgical Provider  Teja Martinez MD as MD (Radiation Oncology)  Cora Lake, RN as Specialty Care Coordinator (Radiation Oncology)  Teja Martinez MD as Assigned Cancer Care Provider       Teja Martinez M.D.  Department of Radiation Oncology  [facility]

## 2023-05-19 ENCOUNTER — OFFICE VISIT (OUTPATIENT)
Dept: RADIATION ONCOLOGY | Facility: CLINIC | Age: 73
End: 2023-05-19
Payer: MEDICARE

## 2023-05-19 VITALS
SYSTOLIC BLOOD PRESSURE: 115 MMHG | OXYGEN SATURATION: 95 % | WEIGHT: 181.9 LBS | RESPIRATION RATE: 16 BRPM | BODY MASS INDEX: 28.49 KG/M2 | DIASTOLIC BLOOD PRESSURE: 78 MMHG | HEART RATE: 102 BPM | TEMPERATURE: 97.4 F

## 2023-05-19 DIAGNOSIS — C61 PROSTATE CANCER (H): Primary | ICD-10-CM

## 2023-05-19 PROCEDURE — 99024 POSTOP FOLLOW-UP VISIT: CPT | Performed by: SURGERY

## 2023-05-19 ASSESSMENT — PAIN SCALES - GENERAL: PAINLEVEL: NO PAIN (0)

## 2023-05-19 NOTE — LETTER
2023         RE: Jordan Kennedy   [address] [facility] [address]        Dear Colleague,    Thank you for referring your patient, Jordan Kennedy, to the [facility] RADIATION ONCOLOGY [place]. Please see a copy of my visit note below.         Department of Radiation Oncology  [facility]: Cancer Center  [facility]  [address] [address] [place] [address]  [phone]       Radiation Oncology Follow-up Visit  May 19, 2023      Jordan Kennedy  MRN: [id number]   : 1950     DIAGNOSIS / ID: Mr. Kennedy is a 72 year old male with high risk prostate cancer, cT1c, PSA 27.4, GS 3+4=7. PET/PSMA negative for regional or distant disease.     INTENT OF RADIOTHERAPY: Definitive      CONCURRENT SYSTEMIC THERAPY: Yes             SITE OF TREATMENT: Prostate and lymph nodes      DATES  OF TREATMENT: 3/9/23- 23     TOTAL DOSE OF TREATMENT / FRACTIONS: 70.2Gy/26fx    INTERVAL SINCE COMPLETION OF RADIATION THERAPY: 1 month    SUBJECTIVE:  Overall, patient is doing well since completion of radiation therapy. Denies any new GI/ symptoms and energy levels have returned.    IPSS:    QoL: 0    FELISA: n/a    PHYSICAL EXAM:  /78   Pulse 102   Temp 97.4  F (36.3  C) (Oral)   Resp 16   Wt 82.5 kg (181 lb 14.4 oz)   SpO2 95%   BMI 28.49 kg/m    Gen: Alert, in NAD  Eyes: PERRL, EOMI, sclera anicteric  HENT     Head: NC/AT     Ears: No external auricular lesions     Nose/sinus: No rhinorrhea or epistaxis     Oral Cavity/Oropharynx: MMM  Neck: Supple, full ROM, no LAD  Pulm: No wheezing, stridor or respiratory distress  CV: Well-perfused, no cyanosis, no pedal edema  Abdominal: Soft, nontender, nondistended, no hepatomegaly  Back: No step-offs or pain to palpation along the thoracolumbar spine, no CVA tenderness  Rectal/: deferred  Musculoskeletal: Normal bulk and tone   Skin: Normal color and turgor  Neurologic: A/Ox3,  CN II-XII intact  Psychiatric: Appropriate mood and affect    LABS AND IMAGING:  PSA   Date Value Ref Range Status   04/15/2013 3.26 0 - 4 ug/L Final   03/07/2008 1.920 ng/mL    03/07/2008 1.920 ng/mL      Prostate Specific Antigen Screen   Date Value Ref Range Status   11/10/2021 22.40 (H) 0.00 - 4.00 ug/L Final     PSA Tumor Marker   Date Value Ref Range Status   08/11/2022 27.80 (H) 0.00 - 4.00 ug/L Final         IMPRESSION:     Patient is doing well from a acute toxicity perspective.     PLAN:   1. PSA in 6 months    Teja Martinez M.D.  Department of Radiation Oncology  [facility]           Again, thank you for allowing me to participate in the care of your patient.        Sincerely,        Teja Martinez MD

## 2023-05-19 NOTE — NURSING NOTE
FOLLOW-UP VISIT    Patient Name: Jordan Kennedy      : 1950     Age: 73 year old        ______________________________________________________________________________     Chief Complaint   Patient presents with     Radiation Therapy     Return appointment with Dr. Martinez     Prostate Cancer     /78   Pulse 102   Temp 97.4  F (36.3  C) (Oral)   Resp 16   Wt 82.5 kg (181 lb 14.4 oz)   SpO2 95%   BMI 28.49 kg/m       Date Radiation Completed: 7,020 cGy to pelvis completed on 23    Pain  Denies    Meds  Current Med List Reviewed: Yes  Medication Note:     AUA:  See Epic Flowsheet  FELISA:  See Epic Flowsheet    PSA   Date Value Ref Range Status   04/15/2013 3.26 0 - 4 ug/L Final   2008 1.920 ng/mL    2008 1.920 ng/mL      Prostate Specific Antigen Screen   Date Value Ref Range Status   11/10/2021 22.40 (H) 0.00 - 4.00 ug/L Final     PSA Tumor Marker   Date Value Ref Range Status   2022 27.80 (H) 0.00 - 4.00 ug/L Final       Bowel: Normal- patient taking Metamucil daily    Bladder: nocturia, frequency, urgency, and weak stream  Nocturia: 1 - Once every 8 hours    Energy Level: normal    Appointments:   Urologist:       Other Notes: Follow up PSA and Return with Dr. Martinez in 5 months.    Roselyn Vazquez RN

## 2023-05-19 NOTE — PROGRESS NOTES
Department of Radiation Oncology  [facility]    Health: Cancer Center  [facility]  [address] [place] [id number] [phone]       Radiation Oncology Follow-up Visit  May 19, 2023      Jordan Kennedy  MRN: [id number]   : 1950     DIAGNOSIS / ID: Mr. Kennedy is a 72 year old male with high risk prostate cancer, cT1c, PSA 27.4, GS 3+4=7. PET/PSMA negative for regional or distant disease.     INTENT OF RADIOTHERAPY: Definitive      CONCURRENT SYSTEMIC THERAPY: Yes             SITE OF TREATMENT: Prostate and lymph nodes      DATES  OF TREATMENT: 3/9/23- 23     TOTAL DOSE OF TREATMENT / FRACTIONS: 70.2Gy/26fx    INTERVAL SINCE COMPLETION OF RADIATION THERAPY: 1 month    SUBJECTIVE:  Overall, patient is doing well since completion of radiation therapy. Denies any new GI/ symptoms and energy levels have returned.    IPSS:    QoL: 0    FELISA: n/a    PHYSICAL EXAM:  /78   Pulse 102   Temp 97.4  F (36.3  C) (Oral)   Resp 16   Wt 82.5 kg (181 lb 14.4 oz)   SpO2 95%   BMI 28.49 kg/m    Gen: Alert, in NAD  Eyes: PERRL, EOMI, sclera anicteric  HENT     Head: NC/AT     Ears: No external auricular lesions     Nose/sinus: No rhinorrhea or epistaxis     Oral Cavity/Oropharynx: MMM  Neck: Supple, full ROM, no LAD  Pulm: No wheezing, stridor or respiratory distress  CV: Well-perfused, no cyanosis, no pedal edema  Abdominal: Soft, nontender, nondistended, no hepatomegaly  Back: No step-offs or pain to palpation along the thoracolumbar spine, no CVA tenderness  Rectal/: deferred  Musculoskeletal: Normal bulk and tone   Skin: Normal color and turgor  Neurologic: A/Ox3, CN II-XII intact  Psychiatric: Appropriate mood and affect    LABS AND IMAGING:  PSA   Date Value Ref Range Status   04/15/2013 3.26 0 - 4 ug/L Final   2008 1.920 ng/mL    2008 1.920 ng/mL      Prostate Specific Antigen Screen   Date Value Ref Range Status    11/10/2021 22.40 (H) 0.00 - 4.00 ug/L Final     PSA Tumor Marker   Date Value Ref Range Status   08/11/2022 27.80 (H) 0.00 - 4.00 ug/L Final         IMPRESSION:     Patient is doing well from a acute toxicity perspective.     PLAN:   1. PSA in 6 months    Teja Martinez M.D.  Department of Radiation Oncology  [facility]

## 2023-06-06 PROBLEM — C61 PROSTATE CANCER (H): Status: ACTIVE | Noted: 2022-09-20

## 2023-10-16 ENCOUNTER — LAB (OUTPATIENT)
Dept: LAB | Facility: CLINIC | Age: 73
End: 2023-10-16
Payer: MEDICARE

## 2023-10-16 DIAGNOSIS — C61 PROSTATE CANCER (H): ICD-10-CM

## 2023-10-16 LAB — PSA SERPL DL<=0.01 NG/ML-MCNC: 0.1 NG/ML (ref 0–6.5)

## 2023-10-16 PROCEDURE — 84153 ASSAY OF PSA TOTAL: CPT

## 2023-10-16 PROCEDURE — 36415 COLL VENOUS BLD VENIPUNCTURE: CPT

## 2023-10-19 ENCOUNTER — OFFICE VISIT (OUTPATIENT)
Dept: RADIATION ONCOLOGY | Facility: CLINIC | Age: 73
End: 2023-10-19
Payer: MEDICARE

## 2023-10-19 ENCOUNTER — INFUSION THERAPY VISIT (OUTPATIENT)
Dept: INFUSION THERAPY | Facility: CLINIC | Age: 73
End: 2023-10-19
Payer: MEDICARE

## 2023-10-19 VITALS
HEART RATE: 84 BPM | DIASTOLIC BLOOD PRESSURE: 74 MMHG | OXYGEN SATURATION: 97 % | WEIGHT: 181 LBS | SYSTOLIC BLOOD PRESSURE: 126 MMHG | TEMPERATURE: 97.5 F | RESPIRATION RATE: 18 BRPM | BODY MASS INDEX: 28.35 KG/M2

## 2023-10-19 VITALS
RESPIRATION RATE: 18 BRPM | BODY MASS INDEX: 28.35 KG/M2 | WEIGHT: 181 LBS | TEMPERATURE: 97.5 F | DIASTOLIC BLOOD PRESSURE: 74 MMHG | HEART RATE: 84 BPM | SYSTOLIC BLOOD PRESSURE: 126 MMHG | OXYGEN SATURATION: 97 %

## 2023-10-19 DIAGNOSIS — C61 PROSTATE CANCER (H): Primary | ICD-10-CM

## 2023-10-19 PROCEDURE — 96402 CHEMO HORMON ANTINEOPL SQ/IM: CPT | Performed by: SURGERY

## 2023-10-19 PROCEDURE — 99213 OFFICE O/P EST LOW 20 MIN: CPT | Performed by: SURGERY

## 2023-10-19 ASSESSMENT — PAIN SCALES - GENERAL: PAINLEVEL: NO PAIN (0)

## 2023-10-19 NOTE — PROGRESS NOTES
Infusion Nursing Note:  Jordan Kennedy presents today for Lupron.    Patient seen by provider today: Yes: Dr. Martinez   present during visit today: Not Applicable.    Note: N/A.      Intravenous Access:  No Intravenous access/labs at this visit.    Treatment Conditions:  Not Applicable.      Post Infusion Assessment:  Patient tolerated injection without incident.  Site patent and intact, free from redness, edema or discomfort.       Discharge Plan:   Patient discharged in stable condition accompanied by: brother.  Departure Mode: Ambulatory.  Scheduled next 6 month Lupron injection for 4/18/24 April ERIKA Persaud

## 2023-10-19 NOTE — NURSING NOTE
"  RADIATION ONCOLOGY [place] FOLLOW-UP VISIT    Patient Name: Jordan Kennedy      : 1950     Age: 73 year old        ______________________________________________________________________________       Chief Complaint   Patient presents with    Cancer     Radiation oncology return visit with Dr. Martinez     /74 (BP Location: Left arm, Patient Position: Chair, Cuff Size: Adult Regular)   Pulse 84   Temp 97.5  F (36.4  C) (Oral)   Resp 18   Wt 82.1 kg (181 lb)   SpO2 97%   BMI 28.35 kg/m       Radiation History:  Site: pelvis  Total Dose: 7,020 cGy  Date Completed: 2023  Clinic: [facility]  Physician: Dr. Teja Martinez    Pain:  Denies    Imaging:  None    Labs:  Other Labs: Yes: PSA drawn 10/16/2023      PSA:   PSA   Date Value Ref Range Status   04/15/2013 3.26 0 - 4 ug/L Final   2008 1.920 ng/mL    2008 1.920 ng/mL      Prostate Specific Antigen Screen   Date Value Ref Range Status   11/10/2021 22.40 (H) 0.00 - 4.00 ug/L Final     PSA Tumor Marker   Date Value Ref Range Status   10/16/2023 0.10 0.00 - 6.50 ng/mL Final   2022 27.80 (H) 0.00 - 4.00 ug/L Final     Testosterone Level: No results found for: \"TESTOSTTOTAL\"        On-Study AUA Symptom Score (PQ)  AUA (American Urological Association) Symptom Score  1. Over the past month, how often have you had a sensation of not emptying your bladder completely after you finished urinating?: Less than half the time  2. Over the past month, how often have you had to urinate again less than two hours after you finished urinating?: About half the time  3. Over the past month, how often have you found you stopped and started again several times when you urinated?: About half the time  4. Over the past month, how often have you found it difficult to postpone urination?: Not at all  5. Over the past month, how often have you had a weak urine stream?: About half the time  6. Over the past month, how often have you had " to push or strain to begin urinating?: Less than 1 time in 5  7. Over the past month, how many times did you typically get up to urinate from the time you went to bed at night until the time you got up in the morning?: 2 times  The Disease-specific Quality of Life Question: If you were to spend the rest of your life with your urinary condition just the way it is now, how would you feel about that? (highlight or underline): Pleased  AUA Score: 14    Fatigue:   Grade 0: No toxicity    Skin:  No Concerns    Diarrhea:   0- None    Constipation:   1- Occassional or intermittent s/s; occasional use of stool softners, laxatives, enema - reviewed use of daily Metamucil po and patient and brother verbalized understanding, patient reports having bowel movement every three days.    Nausea/Vomiting:  No    Hormonal Therapy:  patient received Lupron 45 mg injection on 12/5/2022.    Additional Instructions: return to clinic in 6 months for visit with Dr. Martinez and PSA    Future Appointments:     Appointment Date:     Appointment Date:     Appointment Date:        Nurse face-to-face time: Level 4:  15 min face to face time.    Cora Interiano RN BSN OCN CBCN

## 2023-10-19 NOTE — LETTER
10/19/2023         RE: Jordan Kennedy   [address] [facility] [address]        Dear Colleague,    Thank you for referring your patient, Jordan Kennedy, to the [facility] RADIATION ONCOLOGY [place]. Please see a copy of my visit note below.         Department of Radiation Oncology  [facility]: Cancer Center  [facility]  [address] [address] [place] [id number]  [phone]       Radiation Oncology Follow-up Visit  Oct 19, 2023      Jordan Kennedy  MRN: [id number]   : 1950     DIAGNOSIS / ID: Mr. Kennedy is a 72 year old male with high risk prostate cancer, cT1c, PSA 27.4, GS 3+4=7. PET/PSMA negative for regional or distant disease.     INTENT OF RADIOTHERAPY: Definitive      CONCURRENT SYSTEMIC THERAPY: Yes. Discussed ADT for 1.5 to 3 years.              SITE OF TREATMENT: Prostate and lymph nodes      DATES  OF TREATMENT: 3/9/23- 23     TOTAL DOSE OF TREATMENT / FRACTIONS: 70.2Gy/26fx    INTERVAL SINCE COMPLETION OF RADIATION THERAPY: 6 months    SUBJECTIVE:  Overall, patient is doing well since completion of radiation therapy. Denies any new GI/ symptoms and energy levels are stable.    IPSS:14/35    QoL: 0    FELISA: n/a    PHYSICAL EXAM:  /74 (BP Location: Left arm, Patient Position: Chair, Cuff Size: Adult Regular)   Pulse 84   Temp 97.5  F (36.4  C) (Oral)   Resp 18   Wt 82.1 kg (181 lb)   SpO2 97%   BMI 28.35 kg/m    Gen: Alert, in NAD  Eyes: PERRL, EOMI, sclera anicteric  HENT     Head: NC/AT     Ears: No external auricular lesions     Nose/sinus: No rhinorrhea or epistaxis     Oral Cavity/Oropharynx: MMM  Neck: Supple, full ROM, no LAD  Pulm: No wheezing, stridor or respiratory distress  CV: Well-perfused, no cyanosis, no pedal edema  Abdominal: Soft, nontender, nondistended, no hepatomegaly  Back: No step-offs or pain to palpation along the thoracolumbar spine, no CVA  tenderness  Rectal/: deferred  Musculoskeletal: Normal bulk and tone   Skin: Normal color and turgor  Neurologic: A/Ox3, CN II-XII intact  Psychiatric: Appropriate mood and affect    LABS AND IMAGING:  PSA   Date Value Ref Range Status   04/15/2013 3.26 0 - 4 ug/L Final   03/07/2008 1.920 ng/mL    03/07/2008 1.920 ng/mL      Prostate Specific Antigen Screen   Date Value Ref Range Status   11/10/2021 22.40 (H) 0.00 - 4.00 ug/L Final     PSA Tumor Marker   Date Value Ref Range Status   10/16/2023 0.10 0.00 - 6.50 ng/mL Final   08/11/2022 27.80 (H) 0.00 - 4.00 ug/L Final         IMPRESSION: Patient is doing well from a acute toxicity perspective. PSA has decreased to 0.10.     PLAN:   Will receive ADT today (45mg). Plan for ADT for 1.5- 3 years, given high risk disease.    PSA in 6 months    Teja Martinez M.D.  Department of Radiation Oncology  [facility]         Again, thank you for allowing me to participate in the care of your patient.        Sincerely,        Teja Martinez MD

## 2023-10-19 NOTE — PATIENT INSTRUCTIONS
Please contact [facility] RN with questions or concerns following today's appointment: [phone].       Please feel free to leave a detailed message if your call is not answered.    If your call is not received before 3:00 PM, it may not be returned until the following business day.    If you are receiving radiation treatment and need assistance after 3:00 PM or on the weekends, please call [phone] and ask to speak to the radiation oncologist on-call.    Thank you!    Cora DALEY

## 2023-10-24 NOTE — PROGRESS NOTES
Department of Radiation Oncology  [facility]    Health: Cancer Center  [facility]  [address] [place] [address] [phone]       Radiation Oncology Follow-up Visit  Oct 19, 2023      Jordan Kennedy  MRN: [id number]   : 1950     DIAGNOSIS / ID: Mr. Kennedy is a 72 year old male with high risk prostate cancer, cT1c, PSA 27.4, GS 3+4=7. PET/PSMA negative for regional or distant disease.     INTENT OF RADIOTHERAPY: Definitive      CONCURRENT SYSTEMIC THERAPY: Yes. Discussed ADT for 1.5 to 3 years.              SITE OF TREATMENT: Prostate and lymph nodes      DATES  OF TREATMENT: 3/9/23- 23     TOTAL DOSE OF TREATMENT / FRACTIONS: 70.2Gy/26fx    INTERVAL SINCE COMPLETION OF RADIATION THERAPY: 6 months    SUBJECTIVE:  Overall, patient is doing well since completion of radiation therapy. Denies any new GI/ symptoms and energy levels are stable.    IPSS:14/35    QoL: 0    FELISA: n/a    PHYSICAL EXAM:  /74 (BP Location: Left arm, Patient Position: Chair, Cuff Size: Adult Regular)   Pulse 84   Temp 97.5  F (36.4  C) (Oral)   Resp 18   Wt 82.1 kg (181 lb)   SpO2 97%   BMI 28.35 kg/m    Gen: Alert, in NAD  Eyes: PERRL, EOMI, sclera anicteric  HENT     Head: NC/AT     Ears: No external auricular lesions     Nose/sinus: No rhinorrhea or epistaxis     Oral Cavity/Oropharynx: MMM  Neck: Supple, full ROM, no LAD  Pulm: No wheezing, stridor or respiratory distress  CV: Well-perfused, no cyanosis, no pedal edema  Abdominal: Soft, nontender, nondistended, no hepatomegaly  Back: No step-offs or pain to palpation along the thoracolumbar spine, no CVA tenderness  Rectal/: deferred  Musculoskeletal: Normal bulk and tone   Skin: Normal color and turgor  Neurologic: A/Ox3, CN II-XII intact  Psychiatric: Appropriate mood and affect    LABS AND IMAGING:  PSA   Date Value Ref Range Status   04/15/2013 3.26 0 - 4 ug/L Final   2008 1.920 ng/mL     03/07/2008 1.920 ng/mL      Prostate Specific Antigen Screen   Date Value Ref Range Status   11/10/2021 22.40 (H) 0.00 - 4.00 ug/L Final     PSA Tumor Marker   Date Value Ref Range Status   10/16/2023 0.10 0.00 - 6.50 ng/mL Final   08/11/2022 27.80 (H) 0.00 - 4.00 ug/L Final         IMPRESSION: Patient is doing well from a acute toxicity perspective. PSA has decreased to 0.10.     PLAN:   Will receive ADT today (45mg). Plan for ADT for 1.5- 3 years, given high risk disease.    PSA in 6 months    Teja Martinez M.D.  Department of Radiation Oncology  [facility]

## 2024-01-27 ENCOUNTER — HEALTH MAINTENANCE LETTER (OUTPATIENT)
Age: 74
End: 2024-01-27

## 2024-02-01 SDOH — HEALTH STABILITY: PHYSICAL HEALTH: ON AVERAGE, HOW MANY DAYS PER WEEK DO YOU ENGAGE IN MODERATE TO STRENUOUS EXERCISE (LIKE A BRISK WALK)?: 1 DAY

## 2024-02-01 SDOH — HEALTH STABILITY: PHYSICAL HEALTH: ON AVERAGE, HOW MANY MINUTES DO YOU ENGAGE IN EXERCISE AT THIS LEVEL?: 30 MIN

## 2024-02-01 ASSESSMENT — SOCIAL DETERMINANTS OF HEALTH (SDOH): HOW OFTEN DO YOU GET TOGETHER WITH FRIENDS OR RELATIVES?: ONCE A WEEK

## 2024-02-08 ENCOUNTER — ORDERS ONLY (AUTO-RELEASED) (OUTPATIENT)
Dept: FAMILY MEDICINE | Facility: CLINIC | Age: 74
End: 2024-02-08

## 2024-02-08 ENCOUNTER — OFFICE VISIT (OUTPATIENT)
Dept: FAMILY MEDICINE | Facility: CLINIC | Age: 74
End: 2024-02-08
Payer: MEDICARE

## 2024-02-08 VITALS
SYSTOLIC BLOOD PRESSURE: 103 MMHG | HEIGHT: 67 IN | BODY MASS INDEX: 28.72 KG/M2 | HEART RATE: 96 BPM | DIASTOLIC BLOOD PRESSURE: 67 MMHG | RESPIRATION RATE: 22 BRPM | TEMPERATURE: 97.4 F | OXYGEN SATURATION: 96 % | WEIGHT: 183 LBS

## 2024-02-08 DIAGNOSIS — E78.5 HYPERLIPIDEMIA LDL GOAL <130: ICD-10-CM

## 2024-02-08 DIAGNOSIS — Z12.11 SCREEN FOR COLON CANCER: ICD-10-CM

## 2024-02-08 DIAGNOSIS — C61 PROSTATE CANCER (H): ICD-10-CM

## 2024-02-08 DIAGNOSIS — G40.909 SEIZURE DISORDER (H): ICD-10-CM

## 2024-02-08 DIAGNOSIS — Z00.00 ENCOUNTER FOR MEDICARE ANNUAL WELLNESS EXAM: Primary | ICD-10-CM

## 2024-02-08 LAB
BASOPHILS # BLD AUTO: 0 10E3/UL (ref 0–0.2)
BASOPHILS NFR BLD AUTO: 0 %
EOSINOPHIL # BLD AUTO: 0.1 10E3/UL (ref 0–0.7)
EOSINOPHIL NFR BLD AUTO: 2 %
ERYTHROCYTE [DISTWIDTH] IN BLOOD BY AUTOMATED COUNT: 12.1 % (ref 10–15)
HCT VFR BLD AUTO: 44.3 % (ref 40–53)
HGB BLD-MCNC: 15 G/DL (ref 13.3–17.7)
IMM GRANULOCYTES # BLD: 0 10E3/UL
IMM GRANULOCYTES NFR BLD: 0 %
LYMPHOCYTES # BLD AUTO: 1.9 10E3/UL (ref 0.8–5.3)
LYMPHOCYTES NFR BLD AUTO: 37 %
MCH RBC QN AUTO: 31.6 PG (ref 26.5–33)
MCHC RBC AUTO-ENTMCNC: 33.9 G/DL (ref 31.5–36.5)
MCV RBC AUTO: 94 FL (ref 78–100)
MONOCYTES # BLD AUTO: 0.6 10E3/UL (ref 0–1.3)
MONOCYTES NFR BLD AUTO: 12 %
NEUTROPHILS # BLD AUTO: 2.5 10E3/UL (ref 1.6–8.3)
NEUTROPHILS NFR BLD AUTO: 49 %
PLATELET # BLD AUTO: 190 10E3/UL (ref 150–450)
RBC # BLD AUTO: 4.74 10E6/UL (ref 4.4–5.9)
WBC # BLD AUTO: 5.1 10E3/UL (ref 4–11)

## 2024-02-08 PROCEDURE — 80053 COMPREHEN METABOLIC PANEL: CPT | Performed by: FAMILY MEDICINE

## 2024-02-08 PROCEDURE — 36415 COLL VENOUS BLD VENIPUNCTURE: CPT | Performed by: FAMILY MEDICINE

## 2024-02-08 PROCEDURE — 85025 COMPLETE CBC W/AUTO DIFF WBC: CPT | Performed by: FAMILY MEDICINE

## 2024-02-08 PROCEDURE — 80061 LIPID PANEL: CPT | Performed by: FAMILY MEDICINE

## 2024-02-08 PROCEDURE — G0439 PPPS, SUBSEQ VISIT: HCPCS | Performed by: FAMILY MEDICINE

## 2024-02-08 PROCEDURE — 99214 OFFICE O/P EST MOD 30 MIN: CPT | Mod: 25 | Performed by: FAMILY MEDICINE

## 2024-02-08 RX ORDER — PHENYTOIN SODIUM 100 MG/1
100 CAPSULE, EXTENDED RELEASE ORAL DAILY
Qty: 90 CAPSULE | Refills: 3 | Status: SHIPPED | OUTPATIENT
Start: 2024-02-08

## 2024-02-08 RX ORDER — LOVASTATIN 40 MG
40 TABLET ORAL AT BEDTIME
Qty: 90 TABLET | Refills: 3 | Status: SHIPPED | OUTPATIENT
Start: 2024-02-08

## 2024-02-08 ASSESSMENT — PAIN SCALES - GENERAL: PAINLEVEL: NO PAIN (0)

## 2024-02-08 NOTE — PROGRESS NOTES
"Preventive Care Visit  [facility]  Mehreen Strange MD, Family Medicine  Feb 8, 2024    Assessment & Plan     Encounter for Medicare annual wellness exam   Discussed diet, exercise, wellness and other preventive recommendations related to health maintenance.   Follow up as needed for acute issues.    Physical exam recommended in one year.   Fall risks precautions.    - Comprehensive metabolic panel (BMP + Alb, Alk Phos, ALT, AST, Total. Bili, TP); Future  - CBC with platelets and differential; Future  - Comprehensive metabolic panel (BMP + Alb, Alk Phos, ALT, AST, Total. Bili, TP)  - CBC with platelets and differential   Declined vaccinations today    Screen for colon cancer  Screening.  - COLOGUARD([employer]); Future    Hyperlipidemia LDL goal <130  Discussed diet and weight loss  - Lipid panel reflex to direct LDL Fasting; Future  - lovastatin (MEVACOR) 40 MG tablet; Take 1 tablet (40 mg) by mouth at bedtime  - Lipid panel reflex to direct LDL Fasting    Seizure disorder (H)  Stable, continue with current med.    - phenytoin (DILANTIN) 100 MG ER capsule; Take 1 capsule (100 mg) by mouth daily    Prostate cancer (H)  S/P radiation therapy  Follow up with Urology.      BMI  Estimated body mass index is 28.66 kg/m  as calculated from the following:    Height as of this encounter: 1.702 m (5' 7\").    Weight as of this encounter: 83 kg (183 lb).   Weight management plan: Discussed healthy diet and exercise guidelines    Counseling  Appropriate preventive services were discussed with this patient, including applicable screening as appropriate for fall prevention, nutrition, physical activity, Tobacco-use cessation, weight loss and cognition.  Checklist reviewing preventive services available has been given to the patient.  Reviewed patient's diet, addressing concerns and/or questions.   He is at risk for lack of exercise and has been provided with information to increase physical " activity for the benefit of his well-being.   He is at risk for psychosocial distress and has been provided with information to reduce risk.   The patient was provided with written information regarding signs of hearing loss.     Patient has been advised of split billing requirements and indicates understanding: Yes   Declined vaccinations today.    Work on weight loss  Regular exercise    Michelle Ortega is a 73 year old, presenting for the following:  Wellness Visit  History of prostate cancer, status postradiation he does follow-up with neurology.  History of seizure been stable has not had any seizure activity for over 20 years, continue to use the same dosage of Dilantin.        2/8/2024     1:29 PM   Additional Questions   Roomed by Marlin ZUÑIGA CMA   Accompanied by N/A         2/8/2024     1:29 PM   Patient Reported Additional Medications   Patient reports taking the following new medications None         Health Care Directive  Patient does not have a Health Care Directive or Living Will: Discussed advance care planning with patient; however, patient declined at this time.        2/1/2024   General Health   How would you rate your overall physical health? Good   Feel stress (tense, anxious, or unable to sleep) Only a little   (!) STRESS CONCERN      2/1/2024   Nutrition   Diet: Regular (no restrictions)         2/1/2024   Exercise   Days per week of moderate/strenous exercise 1 day   Average minutes spent exercising at this level 30 min   (!) EXERCISE CONCERN      2/1/2024   Social Factors   Frequency of gathering with friends or relatives Once a week   Worry food won't last until get money to buy more No   Food not last or not have enough money for food? No   Do you have housing?  Yes   Are you worried about losing your housing? No   Lack of transportation? No   Unable to get utilities (heat,electricity)? No         2/1/2024   Fall Risk   Fallen 2 or more times in the past year? No    No   Trouble with  walking or balance? No          2/1/2024   Activities of Daily Living- Home Safety   Needs help with the following daily activites None of the above   Safety concerns in the home None of the above         2/1/2024   Dental   Dentist two times every year? Yes         2/1/2024   Hearing Screening   Hearing concerns? (!) I NEED TO ASK PEOPLE TO SPEAK UP OR REPEAT THEMSELVES.         2/1/2024   Driving Risk Screening   Patient/family members have concerns about driving No         2/1/2024   General Alertness/Fatigue Screening   Have you been more tired than usual lately? No         2/1/2024   Urinary Incontinence Screening   Bothered by leaking urine in past 6 months No         2/1/2024   TB Screening   Were you born outside of [place]?  No         Today's PHQ-2 Score:       2/8/2024     1:28 PM   PHQ-2 ( 1999 [employer])   Q1: Little interest or pleasure in doing things 0   Q2: Feeling down, depressed or hopeless 0   PHQ-2 Score 0   Q1: Little interest or pleasure in doing things Not at all   Q2: Feeling down, depressed or hopeless Not at all   PHQ-2 Score 0           2/1/2024   Substance Use   Alcohol more than 3/day or more than 7/wk No   Do you have a current opioid prescription? No   How severe/bad is pain from 1 to 10? 0/10 (No Pain)   Do you use any other substances recreationally? No     Social History     Tobacco Use    Smoking status: Never     Passive exposure: Never    Smokeless tobacco: Never   Vaping Use    Vaping Use: Never used   Substance Use Topics    Alcohol use: Yes     Comment: Once in a while    Drug use: No           2/1/2024   AAA Screening   Family history of Abdominal Aortic Aneurysm (AAA)? No   The 10-year ASCVD risk score (Virgilio ADAN, et al., 2019) is: 16.2%    Values used to calculate the score:      Age: 73 years      Sex: Male      Is Non- : No      Diabetic: No      Tobacco smoker: No      Systolic Blood Pressure: 103 mmHg      Is BP treated: No      HDL Cholesterol: 48  mg/dL      Total Cholesterol: 180 mg/dL          Reviewed and updated as needed this visit by Provider                    Past Medical History:   Diagnosis Date    HLD (hyperlipidemia)     Mild cognitive impairment     Prostate cancer (H) 02/02/2022    S/P radiation therapy     7,020 cGy to pelvis completed on 4/17/2023 [facility]    Seizure disorder (H)      Past Surgical History:   Procedure Laterality Date    PROSTATE BIOPSY  02/02/2022    TRANSRECTAL ULTRASONIC SONOGRAM N/A 1/13/2023    Procedure: Transrectal ultrasound guided placement of fiducials and SpaceOar;  Surgeon: Daniel Henley MD;  Location: [facility] OR     OB History   No obstetric history on file.     Current providers sharing in care for this patient include:  Patient Care Team:  Mehreen Strange MD as PCP - General (Family Medicine)  Mehreen Strange MD as Assigned PCP  Ami Villeda MD as Assigned Surgical Provider  Teja Martinez MD as MD (Radiation Oncology)  Cora Lake, RN as Specialty Care Coordinator (Radiation Oncology)  Teja Martinez MD as Assigned Cancer Care Provider    The following health maintenance items are reviewed in Epic and correct as of today:  Health Maintenance   Topic Date Due    Pneumococcal Vaccine: 65+ Years (1 of 2 - PCV) Never done    ZOSTER IMMUNIZATION (1 of 2) Never done    DTAP/TDAP/TD IMMUNIZATION (1 - Tdap) Never done    RSV VACCINE (Pregnancy & 60+) (1 - 1-dose 60+ series) Never done    COLORECTAL CANCER SCREENING  03/12/2019    COVID-19 Vaccine (3 - Moderna risk series) 06/20/2021    INFLUENZA VACCINE (1) Never done    LIPID  02/27/2024    ANNUAL REVIEW OF HM ORDERS  02/27/2024    MEDICARE ANNUAL WELLNESS VISIT  02/08/2025    FALL RISK ASSESSMENT  02/08/2025    GLUCOSE  02/27/2026    ADVANCE CARE PLANNING  11/10/2026    HEPATITIS C SCREENING  Completed    PHQ-2 (once per calendar year)  Completed    IPV IMMUNIZATION  Aged Out    HPV IMMUNIZATION  Aged Out    MENINGITIS IMMUNIZATION  " Aged Out    RSV MONOCLONAL ANTIBODY  Aged Out     Review of Systems    Review of Systems  Constitutional, HEENT, cardiovascular, pulmonary, GI, , musculoskeletal, neuro, skin, endocrine and psych systems are negative, except as otherwise noted.     Objective    Exam  /67 (BP Location: Right arm, Patient Position: Chair, Cuff Size: Adult Large)   Pulse 96   Temp 97.4  F (36.3  C) (Oral)   Resp 22   Ht 1.702 m (5' 7\")   Wt 83 kg (183 lb)   SpO2 96%   BMI 28.66 kg/m     Estimated body mass index is 28.66 kg/m  as calculated from the following:    Height as of this encounter: 1.702 m (5' 7\").    Weight as of this encounter: 83 kg (183 lb).    Physical Exam  GENERAL: alert and no distress  EYES: Eyes grossly normal to inspection, PERRL and conjunctivae and sclerae normal  HENT: ear canals and TM's normal, nose and mouth without ulcers or lesions  NECK: no adenopathy, no asymmetry, masses, or scars  RESP: lungs clear to auscultation - no rales, rhonchi or wheezes  CV: regular rate and rhythm, normal S1 S2, no S3 or S4, no murmur, click or rub, no peripheral edema  ABDOMEN: soft, nontender, no hepatosplenomegaly, no masses and bowel sounds normal  MS: no gross musculoskeletal defects noted, no edema  SKIN: no suspicious lesions or rashes  NEURO: Normal strength and tone, mentation intact and speech normal  PSYCH: mentation appears normal, affect normal/bright        2/8/2024   Mini Cog   Mini-Cog Not Completed (choose reason) Mental handicap         Orders Placed This Encounter   Procedures    REVIEW OF HEALTH MAINTENANCE PROTOCOL ORDERS    Lipid panel reflex to direct LDL Fasting    Comprehensive metabolic panel (BMP + Alb, Alk Phos, ALT, AST, Total. Bili, TP)    CBC with platelets and differential    CBC with platelets and differential    COLOGUARD([employer])      Signed Electronically by: Mehreen Strange MD    "

## 2024-02-08 NOTE — PATIENT INSTRUCTIONS
"Eating Healthy Foods: Care Instructions  With every meal, you can make healthy food choices. Try to eat a variety of fruits, vegetables, whole grains, lean proteins, and low-fat dairy products. This can help you get the right balance of nutrients, including vitamins and minerals. Small changes add up over time. You can start by adding one healthy food to your meals each day.    Try to make half your plate fruits and vegetables, one-fourth whole grains, and one-fourth lean proteins. Try including dairy with your meals.   Eat more fruits and vegetables. Try to have them with most meals and snacks.   Foods for healthy eating    Fruits    These can be fresh, frozen, canned, or dried.  Try to choose whole fruit rather than fruit juice.  Eat a variety of colors.    Vegetables    These can be fresh, frozen, canned, or dried.  Beans, peas, and lentils count too.    Whole grains    Choose whole-grain breads, cereals, and noodles.  Try brown rice.    Lean proteins    These can include lean meat, poultry, fish, and eggs.  You can also have tofu, beans, peas, lentils, nuts, and seeds.    Dairy    Try milk, yogurt, and cheese.  Choose low-fat or fat-free when you can.  If you need to, use lactose-free milk or fortified plant-based milk products, such as soy milk.    Water    Drink water when you're thirsty.  Limit sugar-sweetened drinks, including soda, fruit drinks, and sports drinks.  Where can you learn more?  Go to https://www.[employer].net/patiented  Enter T756 in the search box to learn more about \"Eating Healthy Foods: Care Instructions.\"  Current as of: February 28, 2023               Content Version: 13.8    8401-3146 [employer].   Care instructions adapted under license by your healthcare professional. If you have questions about a medical condition or this instruction, always ask your healthcare professional. [employer] disclaims any warranty or liability for your use of this " information.      Nutrition for Older Adults: Care Instructions  Overview     Good nutrition is important at any age. But it is especially important for older adults. Eating a healthy diet helps keep your body strong. And it can help lower your risk for disease.  As you get older, your body needs more of certain nutrients. These include vitamin B12, calcium, and vitamin D. But it may be harder for you to get these and other important nutrients. This could be for many reasons. You may not feel as hungry as you used to. Or you could have problems with your teeth or mouth that make it hard to chew. Or you may not enjoy planning and preparing meals, especially if you live alone.  Talk with your doctor if you want help getting the most nutrition from what you eat. The doctor may have you work with a dietitian to help you plan meals.  Follow-up care is a key part of your treatment and safety. Be sure to make and go to all appointments, and call your doctor if you are having problems. It's also a good idea to know your test results and keep a list of the medicines you take.  How can you care for yourself at home?  To stay healthy  Eat a variety of foods. The more you vary the foods you eat, the more vitamins, minerals, and other nutrients you get.  Take a multivitamin every day. Choose one with about 100% of the daily value (DV) for vitamins and minerals. Do not take more than 100% of the daily value for any vitamin or mineral unless your doctor tells you to. Talk with your doctor if you are not sure which multivitamin is right for you.  Eat lots of fruits and vegetables. Fresh, frozen, or no-salt canned vegetables and fruits in their own juice or light syrup are good choices.  Include foods that are high in vitamin B12 in your diet. Good choices are fortified breakfast cereal, nonfat or low-fat milk and other dairy products, meat, poultry, fish, and eggs.  Get enough calcium and vitamin D. Good choices include nonfat or  low-fat milk, cheese, and yogurt. Other good options are tofu, orange juice with added calcium, and some leafy green vegetables, such as sol greens and kale. If you don't use milk products, talk to your doctor about calcium and vitamin D supplements.  Eat protein foods every day. Good choices include lean meat, fish, poultry, eggs, and cheese. Other good options are cooked beans, peanut butter, and nuts and seeds.  Choose whole grains for half of the grains you eat. Look for 100% whole wheat bread, whole-grain cereals, brown rice, and other whole grains.  If you have constipation  Eat high-fiber foods every day. These include fruits, vegetables, cooked dried beans, and whole grains.  Drink plenty of fluids. If you have kidney, heart, or liver disease and have to limit fluids, talk with your doctor before you increase the amount of fluids you drink.  Ask your doctor if stool softeners may help keep your bowels regular.  If you have mouth problems that make chewing hard  Pick canned or cooked fruits and vegetables. These are often softer.  Chop or shred meat, poultry, and fish. Add sauce or gravy to the meat to help keep it moist.  Pick other protein foods that are soft. These include cheese, peanut butter, cooked beans, cottage cheese, and eggs.  If you have trouble shopping for yourself  Ask a local food store to deliver groceries to your home.  Contact a volunteer center and ask for help.  Ask a family member or neighbor to help you.  If you have trouble preparing meals  If you are able, take a cooking class.  Use a microwave oven to cook TV dinners and other frozen or prepared foods.  Take part in group meal programs. You can find these through senior citizen programs.  Have meals brought to your home. Your community may offer programs that deliver meals, such as Meals on Wheels.  If your appetite is poor  Try eating smaller amounts of food more often. For example, eat 4 or 5 small meals a day instead of 1 or  "2 large meals.  Eat with family and friends. Or take part in group meal programs offered through volunteer programs. Eating with others may help your appetite. And it helps you be more social.  Ask your doctor if your medicines could cause appetite or taste problems. If so, ask about changing medicines.  Add spices and herbs to increase the flavor of food.  If you think you are depressed, ask your doctor for help. Depression can affect your appetite. And it can make it hard to do everyday activities like grocery shopping and making meals. Treatment can help.  When should you call for help?  Watch closely for changes in your health, and be sure to contact your doctor if you have any problems.  Where can you learn more?  Go to https://www.[employer].net/patiented  Enter L643 in the search box to learn more about \"Nutrition for Older Adults: Care Instructions.\"  Current as of: February 26, 2023               Content Version: 13.8    3823-1604 [employer].   Care instructions adapted under license by your healthcare professional. If you have questions about a medical condition or this instruction, always ask your healthcare professional. [employer] disclaims any warranty or liability for your use of this information.      Hearing Loss: Care Instructions  Overview     Hearing loss is a sudden or slow decrease in how well you hear. It can range from slight to profound. Permanent hearing loss can occur with aging. It also can happen when you are exposed long-term to loud noise. Examples include listening to loud music, riding motorcycles, or being around other loud machines.  Hearing loss can affect your work and home life. It can make you feel lonely or depressed. You may feel that you have lost your independence. But hearing aids and other devices can help you hear better and feel connected to others.  Follow-up care is a key part of your treatment and safety. Be sure to make and go to all " appointments, and call your doctor if you are having problems. It's also a good idea to know your test results and keep a list of the medicines you take.  How can you care for yourself at home?  Avoid loud noises whenever possible. This helps keep your hearing from getting worse.  Always wear hearing protection around loud noises.  Wear a hearing aid as directed.  A professional can help you pick a hearing aid that will work best for you.  You can also get hearing aids over the counter for mild to moderate hearing loss.  Have hearing tests as your doctor suggests. They can show whether your hearing has changed. Your hearing aid may need to be adjusted.  Use other devices as needed. These may include:  Telephone amplifiers and hearing aids that can connect to a television, stereo, radio, or microphone.  Devices that use lights or vibrations. These alert you to the doorbell, a ringing telephone, or a baby monitor.  Television closed-captioning. This shows the words at the bottom of the screen. Most new TVs can do this.  TTY (text telephone). This lets you type messages back and forth on the telephone instead of talking or listening. These devices are also called TDD. When messages are typed on the keyboard, they are sent over the phone line to a receiving TTY. The message is shown on a monitor.  Use text messaging, social media, and email if it is hard for you to communicate by telephone.  Try to learn a listening technique called speechreading. It is not lipreading. You pay attention to people's gestures, expressions, posture, and tone of voice. These clues can help you understand what a person is saying. Face the person you are talking to, and have them face you. Make sure the lighting is good. You need to see the other person's face clearly.  Think about counseling if you need help to adjust to your hearing loss.  When should you call for help?  Watch closely for changes in your health, and be sure to contact your  "doctor if:    You think your hearing is getting worse.     You have new symptoms, such as dizziness or nausea.   Where can you learn more?  Go to https://www.[employer].net/patiented  Enter R798 in the search box to learn more about \"Hearing Loss: Care Instructions.\"  Current as of: February 28, 2023               Content Version: 13.8    3447-8966 [employer].   Care instructions adapted under license by your healthcare professional. If you have questions about a medical condition or this instruction, always ask your healthcare professional. [employer] disclaims any warranty or liability for your use of this information.      Learning About Stress  What is stress?     Stress is your body's response to a hard situation. Your body can have a physical, emotional, or mental response. Stress is a fact of life for most people, and it affects everyone differently. What causes stress for you may not be stressful for someone else.  A lot of things can cause stress. You may feel stress when you go on a job interview, take a test, or run a race. This kind of short-term stress is normal and even useful. It can help you if you need to work hard or react quickly. For example, stress can help you finish an important job on time.  Long-term stress is caused by ongoing stressful situations or events. Examples of long-term stress include long-term health problems, ongoing problems at work, or conflicts in your family. Long-term stress can harm your health.  How does stress affect your health?  When you are stressed, your body responds as though you are in danger. It makes hormones that speed up your heart, make you breathe faster, and give you a burst of energy. This is called the fight-or-flight stress response. If the stress is over quickly, your body goes back to normal and no harm is done.  But if stress happens too often or lasts too long, it can have bad effects. Long-term stress can make you more " likely to get sick, and it can make symptoms of some diseases worse. If you tense up when you are stressed, you may develop neck, shoulder, or low back pain. Stress is linked to high blood pressure and heart disease.  Stress also harms your emotional health. It can make you melchor, tense, or depressed. Your relationships may suffer, and you may not do well at work or school.  What can you do to manage stress?  You can try these things to help manage stress:   Do something active. Exercise or activity can help reduce stress. Walking is a great way to get started. Even everyday activities such as housecleaning or yard work can help.  Try yoga or jhon chi. These techniques combine exercise and meditation. You may need some training at first to learn them.  Do something you enjoy. For example, listen to music or go to a movie. Practice your hobby or do volunteer work.  Meditate. This can help you relax, because you are not worrying about what happened before or what may happen in the future.  Do guided imagery. Imagine yourself in any setting that helps you feel calm. You can use online videos, books, or a teacher to guide you.  Do breathing exercises. For example:  From a standing position, bend forward from the waist with your knees slightly bent. Let your arms dangle close to the floor.  Breathe in slowly and deeply as you return to a standing position. Roll up slowly and lift your head last.  Hold your breath for just a few seconds in the standing position.  Breathe out slowly and bend forward from the waist.  Let your feelings out. Talk, laugh, cry, and express anger when you need to. Talking with supportive friends or family, a counselor, or a eren leader about your feelings is a healthy way to relieve stress. Avoid discussing your feelings with people who make you feel worse.  Write. It may help to write about things that are bothering you. This helps you find out how much stress you feel and what is causing it.  "When you know this, you can find better ways to cope.  What can you do to prevent stress?  You might try some of these things to help prevent stress:  Manage your time. This helps you find time to do the things you want and need to do.  Get enough sleep. Your body recovers from the stresses of the day while you are sleeping.  Get support. Your family, friends, and community can make a difference in how you experience stress.  Limit your news feed. Avoid or limit time on social media or news that may make you feel stressed.  Do something active. Exercise or activity can help reduce stress. Walking is a great way to get started.  Where can you learn more?  Go to https://www.[employer].net/patiented  Enter N032 in the search box to learn more about \"Learning About Stress.\"  Current as of: February 26, 2023               Content Version: 13.8    9950-8426 [employer].   Care instructions adapted under license by your healthcare professional. If you have questions about a medical condition or this instruction, always ask your healthcare professional. [employer] disclaims any warranty or liability for your use of this information.      Preventive Care Advice   This is general advice given by our system to help you stay healthy. However, your care team may have specific advice just for you. Please talk to your care team about your preventive care needs.  Nutrition  Eat 5 or more servings of fruits and vegetables each day.  Try wheat bread, brown rice and whole grain pasta (instead of white bread, rice, and pasta).  Get enough calcium and vitamin D. Check the label on foods and aim for 100% of the RDA (recommended daily allowance).  Lifestyle  Exercise at least 150 minutes each week  (30 minutes a day, 5 days a week).  Do muscle strengthening activities 2 days a week. These help control your weight and prevent disease.  No smoking.  Wear sunscreen to prevent skin cancer.  Have a dental exam and " cleaning every 6 months.  Yearly exams  See your health care team every year to talk about:  Any changes in your health.  Any medicines your care team has prescribed.  Preventive care, family planning, and ways to prevent chronic diseases.  Shots (vaccines)   HPV shots (up to age 26), if you've never had them before.  Hepatitis B shots (up to age 59), if you've never had them before.  COVID-19 shot: Get this shot when it's due.  Flu shot: Get a flu shot every year.  Tetanus shot: Get a tetanus shot every 10 years.  Pneumococcal, hepatitis A, and RSV shots: Ask your care team if you need these based on your risk.  Shingles shot (for age 50 and up)  General health tests  Diabetes screening:  Starting at age 35, Get screened for diabetes at least every 3 years.  If you are younger than age 35, ask your care team if you should be screened for diabetes.  Cholesterol test: At age 39, start having a cholesterol test every 5 years, or more often if advised.  Bone density scan (DEXA): At age 50, ask your care team if you should have this scan for osteoporosis (brittle bones).  Hepatitis C: Get tested at least once in your life.  STIs (sexually transmitted infections)  Before age 24: Ask your care team if you should be screened for STIs.  After age 24: Get screened for STIs if you're at risk. You are at risk for STIs (including HIV) if:  You are sexually active with more than one person.  You don't use condoms every time.  You or a partner was diagnosed with a sexually transmitted infection.  If you are at risk for HIV, ask about PrEP medicine to prevent HIV.  Get tested for HIV at least once in your life, whether you are at risk for HIV or not.  Cancer screening tests  Cervical cancer screening: If you have a cervix, begin getting regular cervical cancer screening tests starting at age 21.  Breast cancer scan (mammogram): If you've ever had breasts, begin having regular mammograms starting at age 40. This is a scan to check  for breast cancer.  Colon cancer screening: It is important to start screening for colon cancer at age 45.  Have a colonoscopy test every 10 years (or more often if you're at risk) Or, ask your provider about stool tests like a FIT test every year or Cologuard test every 3 years.  To learn more about your testing options, visit:   https://www.[employer]/217247.pdf.  For help making a decision, visit:   https://bit.[employer]/od25941.  Prostate cancer screening test: If you have a prostate, ask your care team if a prostate cancer screening test (PSA) at age 55 is right for you.  Lung cancer screening: If you are a current or former smoker ages 50 to 80, ask your care team if ongoing lung cancer screenings are right for you.  For informational purposes only. Not to replace the advice of your health care provider. Copyright   2023 [facility] [employer]. All rights reserved. Clinically reviewed by the [facility] Transitions Program. [employer] 501891 - REV 01/24.

## 2024-02-09 LAB
ALBUMIN SERPL BCG-MCNC: 4.3 G/DL (ref 3.5–5.2)
ALP SERPL-CCNC: 170 U/L (ref 40–150)
ALT SERPL W P-5'-P-CCNC: 33 U/L (ref 0–70)
ANION GAP SERPL CALCULATED.3IONS-SCNC: 11 MMOL/L (ref 7–15)
AST SERPL W P-5'-P-CCNC: 27 U/L (ref 0–45)
BILIRUB SERPL-MCNC: 0.4 MG/DL
BUN SERPL-MCNC: 20.6 MG/DL (ref 8–23)
CALCIUM SERPL-MCNC: 9.3 MG/DL (ref 8.8–10.2)
CHLORIDE SERPL-SCNC: 105 MMOL/L (ref 98–107)
CHOLEST SERPL-MCNC: 176 MG/DL
CREAT SERPL-MCNC: 0.9 MG/DL (ref 0.67–1.17)
DEPRECATED HCO3 PLAS-SCNC: 26 MMOL/L (ref 22–29)
EGFRCR SERPLBLD CKD-EPI 2021: 90 ML/MIN/1.73M2
FASTING STATUS PATIENT QL REPORTED: NO
GLUCOSE SERPL-MCNC: 119 MG/DL (ref 70–99)
HDLC SERPL-MCNC: 38 MG/DL
LDLC SERPL CALC-MCNC: 85 MG/DL
NONHDLC SERPL-MCNC: 138 MG/DL
POTASSIUM SERPL-SCNC: 4.5 MMOL/L (ref 3.4–5.3)
PROT SERPL-MCNC: 6.9 G/DL (ref 6.4–8.3)
SODIUM SERPL-SCNC: 142 MMOL/L (ref 135–145)
TRIGL SERPL-MCNC: 265 MG/DL

## 2024-03-15 LAB — NONINV COLON CA DNA+OCC BLD SCRN STL QL: NEGATIVE

## 2024-04-01 ENCOUNTER — DOCUMENTATION ONLY (OUTPATIENT)
Dept: RADIATION ONCOLOGY | Facility: CLINIC | Age: 74
End: 2024-04-01
Payer: MEDICARE

## 2024-04-01 NOTE — PROGRESS NOTES
This patient has a future lab only appointment on 03/15/2024 and needs orders. Please send orders. Thanks, [facility]

## 2024-04-08 ENCOUNTER — DOCUMENTATION ONLY (OUTPATIENT)
Dept: RADIATION ONCOLOGY | Facility: CLINIC | Age: 74
End: 2024-04-08
Payer: MEDICARE

## 2024-04-08 NOTE — PROGRESS NOTES
"This patient is on our lab schedule for April 15th.  Per patient appointment notes it state \"PSA LAB\"  No orders found in chart.  Please place appropriate order or contact patient.    Thank you  [facility]  "

## 2024-04-15 ENCOUNTER — LAB (OUTPATIENT)
Dept: LAB | Facility: CLINIC | Age: 74
End: 2024-04-15
Payer: MEDICARE

## 2024-04-15 DIAGNOSIS — C61 PROSTATE CANCER (H): ICD-10-CM

## 2024-04-15 DIAGNOSIS — C61 PROSTATE CANCER (H): Primary | ICD-10-CM

## 2024-04-15 PROCEDURE — 99207 PR NO CHARGE LOS: CPT | Performed by: SURGERY

## 2024-04-15 PROCEDURE — 84153 ASSAY OF PSA TOTAL: CPT

## 2024-04-15 PROCEDURE — 36415 COLL VENOUS BLD VENIPUNCTURE: CPT

## 2024-04-16 LAB — PSA SERPL DL<=0.01 NG/ML-MCNC: <0.01 NG/ML (ref 0–6.5)

## 2024-04-18 ENCOUNTER — INFUSION THERAPY VISIT (OUTPATIENT)
Dept: INFUSION THERAPY | Facility: CLINIC | Age: 74
End: 2024-04-18
Attending: RADIOLOGY
Payer: MEDICARE

## 2024-04-18 VITALS
TEMPERATURE: 97.4 F | BODY MASS INDEX: 28.66 KG/M2 | DIASTOLIC BLOOD PRESSURE: 84 MMHG | HEART RATE: 110 BPM | RESPIRATION RATE: 18 BRPM | SYSTOLIC BLOOD PRESSURE: 115 MMHG | HEIGHT: 67 IN | OXYGEN SATURATION: 95 %

## 2024-04-18 DIAGNOSIS — C61 PROSTATE CANCER (H): Primary | ICD-10-CM

## 2024-04-18 PROCEDURE — 96402 CHEMO HORMON ANTINEOPL SQ/IM: CPT

## 2024-04-18 PROCEDURE — 99207 PR NO CHARGE LOS: CPT

## 2024-04-18 PROCEDURE — 250N000011 HC RX IP 250 OP 636: Mod: JZ | Performed by: SURGERY

## 2024-04-18 RX ADMIN — LEUPROLIDE ACETATE 45 MG: KIT at 14:12

## 2024-04-18 ASSESSMENT — PAIN SCALES - GENERAL: PAINLEVEL: NO PAIN (0)

## 2024-04-18 NOTE — PROGRESS NOTES
Infusion Nursing Note:  Jordan Kennedy presents today for Lupron.    Patient seen by provider today: No   present during visit today: Not Applicable.    Note: Assessment performed by Keerthi BRITO RN prior to injection today. .      Intravenous Access:  No Intravenous access/labs at this visit.    Treatment Conditions:  Results reviewed, labs MET treatment parameters, ok to proceed with treatment.      Post Infusion Assessment:  Patient tolerated injection without incident.  Site patent and intact, free from redness, edema or discomfort.       Discharge Plan:   Departure Mode: Ambulatory.      Titus Taylor MA

## 2024-04-19 ENCOUNTER — OFFICE VISIT (OUTPATIENT)
Dept: RADIATION ONCOLOGY | Facility: CLINIC | Age: 74
End: 2024-04-19
Payer: MEDICARE

## 2024-04-19 VITALS
WEIGHT: 183 LBS | OXYGEN SATURATION: 95 % | RESPIRATION RATE: 18 BRPM | BODY MASS INDEX: 28.66 KG/M2 | SYSTOLIC BLOOD PRESSURE: 137 MMHG | HEART RATE: 105 BPM | DIASTOLIC BLOOD PRESSURE: 87 MMHG | TEMPERATURE: 97.8 F

## 2024-04-19 DIAGNOSIS — C61 PROSTATE CANCER (H): Primary | ICD-10-CM

## 2024-04-19 PROCEDURE — 99214 OFFICE O/P EST MOD 30 MIN: CPT | Performed by: SURGERY

## 2024-04-19 ASSESSMENT — PAIN SCALES - GENERAL: PAINLEVEL: NO PAIN (0)

## 2024-04-19 NOTE — NURSING NOTE
"  RADIATION ONCOLOGY PROSTATE FOLLOW-UP VISIT    Patient Name: Jordan Kennedy      : 1950     Age: 73 year old        ______________________________________________________________________________       Chief Complaint   Patient presents with    Cancer     Radiation oncology return visit with Dr. Martinez     /87 (BP Location: Left arm, Patient Position: Chair, Cuff Size: Adult Regular)   Pulse 105   Temp 97.8  F (36.6  C) (Oral)   Resp 18   Wt 83 kg (183 lb)   SpO2 95%   BMI 28.66 kg/m       Radiation History:  Site: pelvis  Total Dose: 7,020 cGy  Date Completed: 2023  Clinic: [facility]  Physician: Dr. Teja Martinez    Pain:  Denies    Imaging:  None    Labs:  Other Labs: Yes: PSA drawn 4/15/2024      PSA:   PSA   Date Value Ref Range Status   04/15/2013 3.26 0 - 4 ug/L Final   2008 1.920 ng/mL    2008 1.920 ng/mL      Prostate Specific Antigen Screen   Date Value Ref Range Status   11/10/2021 22.40 (H) 0.00 - 4.00 ug/L Final     PSA Tumor Marker   Date Value Ref Range Status   04/15/2024 <0.01 0.00 - 6.50 ng/mL Final   10/16/2023 0.10 0.00 - 6.50 ng/mL Final   2022 27.80 (H) 0.00 - 4.00 ug/L Final     Testosterone Level: No results found for: \"TESTOSTTOTAL\"        On-Study AUA Symptom Score (PQ)       Fatigue:   Grade 0: No toxicity    Skin:  No Concerns    Diarrhea:   0- None    Constipation:   0- None    Nausea/Vomiting:  No    Hormonal Therapy:  patient has received Lupron 45 mg injections on 2022, 10/19/2023 and 2024    Additional Instructions: return to clinic in 6 months with visit with Dr. Martinez and PSA    Future Appointments:     Appointment Date:     Appointment Date:     Appointment Date:        Nurse face-to-face time: Level 5:  over 15 min face to face time.    Cora Interiano RN BSN OCN CBCN     "

## 2024-04-19 NOTE — LETTER
2024         RE: Jordan Kennedy   [address] [facility] [address]        Dear Colleague,    Thank you for referring your patient, Jordan Kennedy, to the [facility] RADIATION [facility]COLOG[facility] [place]. Please see a copy of my visit note below.         Department of Radiation Oncology  [facility]: Cancer Center  [facility]  [address] [address] [place] [address]  [phone]       Radiation Oncology Follow-up Visit  2024      Jordan Kennedy  MRN: [id number]   : 1950     DIAGNOSIS / ID: Mr. Kennedy is a 73 year old male with high risk prostate cancer, cT1c, PSA 27.4, GS 3+4=7. PET/PSMA negative for regional or distant disease.     INTENT OF RADIOTHERAPY: Definitive      CONCURRENT SYSTEMIC THERAPY: Yes. Discussed ADT for 1.5 to 3 years. Patient has receive 3x 6 months shots (last 24).           SITE OF TREATMENT: Prostate and lymph nodes      DATES  OF TREATMENT: 3/9/23- 23     TOTAL DOSE OF TREATMENT / FRACTIONS: 70.2Gy/26fx    INTERVAL SINCE COMPLETION OF RADIATION THERAPY: 12 months    SUBJECTIVE:  Overall, patient is doing well since completion of radiation therapy. Denies any new GI/ symptoms and energy levels are stable. Tolerating ADT at this point, with minimal side effects.     IPSS:     QoL: 0    FELISA: n/a    PHYSICAL EXAM:  /87 (BP Location: Left arm, Patient Position: Chair, Cuff Size: Adult Regular)   Pulse 105   Temp 97.8  F (36.6  C) (Oral)   Resp 18   Wt 83 kg (183 lb)   SpO2 95%   BMI 28.66 kg/m    Gen: Alert, in NAD  Eyes: PERRL, EOMI, sclera anicteric  HENT     Head: NC/AT     Ears: No external auricular lesions     Nose/sinus: No rhinorrhea or epistaxis     Oral Cavity/Oropharynx: MMM  Neck: Supple, full ROM, no LAD  Pulm: No wheezing, stridor or respiratory distress  CV: Well-perfused, no cyanosis, no pedal edema  Abdominal: Soft, nontender, nondistended, no  hepatomegaly  Back: No step-offs or pain to palpation along the thoracolumbar spine, no CVA tenderness  Rectal/: deferred  Musculoskeletal: Normal bulk and tone   Skin: Normal color and turgor  Neurologic: A/Ox3, CN II-XII intact  Psychiatric: Appropriate mood and affect    LABS AND IMAGING:  PSA   Date Value Ref Range Status   04/15/2013 3.26 0 - 4 ug/L Final   03/07/2008 1.920 ng/mL    03/07/2008 1.920 ng/mL      Prostate Specific Antigen Screen   Date Value Ref Range Status   11/10/2021 22.40 (H) 0.00 - 4.00 ug/L Final     PSA Tumor Marker   Date Value Ref Range Status   04/15/2024 <0.01 0.00 - 6.50 ng/mL Final   10/16/2023 0.10 0.00 - 6.50 ng/mL Final   08/11/2022 27.80 (H) 0.00 - 4.00 ug/L Final         IMPRESSION: Patient is doing well from a acute toxicity perspective. PSA is now undetectable.     PLAN:   Plan for ADT for 1.5- 3 years, given high risk disease.  Currently tolerating it, will aim for 3 years.  PSA in 6 months    Teja Martinez M.D.  Department of Radiation Oncology  [facility]     A total of 30 minutes were spent on this visit, including preparation for this visit, face to face with patient, and medical decision making. Medical decision-making included consideration and possible diagnosis, management options, complex record review, review of diagnostic tests, consideration and discussion of significant complications based up medical history/comorbidities, and discussion with providers involved in care of the patient.       Again, thank you for allowing me to participate in the care of your patient.        Sincerely,        Teja Martinez MD

## 2024-04-19 NOTE — PATIENT INSTRUCTIONS
Please contact [facility] RN with questions or concerns following today's appointment: [phone].       Please feel free to leave a detailed message if your call is not answered.    If your call is not received before 3:00 PM, it may not be returned until the following business day.    If you are receiving radiation treatment and need assistance after 3:00 PM or on the weekends, please call [phone] and ask to speak to the radiation oncologist on-call.    Thank you!    Cora DALEY

## 2024-04-19 NOTE — PROGRESS NOTES
Department of Radiation Oncology  [facility]    Health: Cancer Center  [facility]  [address] [place] [address] [phone]       Radiation Oncology Follow-up Visit  2024      Jordan Kennedy  MRN: [id number]   : 1950     DIAGNOSIS / ID: Mr. Kennedy is a 73 year old male with high risk prostate cancer, cT1c, PSA 27.4, GS 3+4=7. PET/PSMA negative for regional or distant disease.     INTENT OF RADIOTHERAPY: Definitive      CONCURRENT SYSTEMIC THERAPY: Yes. Discussed ADT for 1.5 to 3 years. Patient has receive 3x 6 months shots (last 24).           SITE OF TREATMENT: Prostate and lymph nodes      DATES  OF TREATMENT: 3/9/23- 23     TOTAL DOSE OF TREATMENT / FRACTIONS: 70.2Gy/26fx    INTERVAL SINCE COMPLETION OF RADIATION THERAPY: 12 months    SUBJECTIVE:  Overall, patient is doing well since completion of radiation therapy. Denies any new GI/ symptoms and energy levels are stable. Tolerating ADT at this point, with minimal side effects.     IPSS:     QoL: 0    FELISA: n/a    PHYSICAL EXAM:  /87 (BP Location: Left arm, Patient Position: Chair, Cuff Size: Adult Regular)   Pulse 105   Temp 97.8  F (36.6  C) (Oral)   Resp 18   Wt 83 kg (183 lb)   SpO2 95%   BMI 28.66 kg/m    Gen: Alert, in NAD  Eyes: PERRL, EOMI, sclera anicteric  HENT     Head: NC/AT     Ears: No external auricular lesions     Nose/sinus: No rhinorrhea or epistaxis     Oral Cavity/Oropharynx: MMM  Neck: Supple, full ROM, no LAD  Pulm: No wheezing, stridor or respiratory distress  CV: Well-perfused, no cyanosis, no pedal edema  Abdominal: Soft, nontender, nondistended, no hepatomegaly  Back: No step-offs or pain to palpation along the thoracolumbar spine, no CVA tenderness  Rectal/: deferred  Musculoskeletal: Normal bulk and tone   Skin: Normal color and turgor  Neurologic: A/Ox3, CN II-XII intact  Psychiatric: Appropriate mood and affect    LABS  AND IMAGING:  PSA   Date Value Ref Range Status   04/15/2013 3.26 0 - 4 ug/L Final   03/07/2008 1.920 ng/mL    03/07/2008 1.920 ng/mL      Prostate Specific Antigen Screen   Date Value Ref Range Status   11/10/2021 22.40 (H) 0.00 - 4.00 ug/L Final     PSA Tumor Marker   Date Value Ref Range Status   04/15/2024 <0.01 0.00 - 6.50 ng/mL Final   10/16/2023 0.10 0.00 - 6.50 ng/mL Final   08/11/2022 27.80 (H) 0.00 - 4.00 ug/L Final         IMPRESSION: Patient is doing well from a acute toxicity perspective. PSA is now undetectable.     PLAN:   Plan for ADT for 1.5- 3 years, given high risk disease.  Currently tolerating it, will aim for 3 years.  PSA in 6 months    Teja Martinez M.D.  Department of Radiation Oncology  [facility]     A total of 30 minutes were spent on this visit, including preparation for this visit, face to face with patient, and medical decision making. Medical decision-making included consideration and possible diagnosis, management options, complex record review, review of diagnostic tests, consideration and discussion of significant complications based up medical history/comorbidities, and discussion with providers involved in care of the patient.

## 2024-07-16 NOTE — TELEPHONE ENCOUNTER
It states that the cat was a rescue and he saved her she was really mean in the kennel and took to him. He states that she is his best friend,and . The cat goes everywhere with him, he even takes her on walks. Her name is julio.    Left message for patient to call back for virtual visit today.    Gianna AVILA RN Urology 11/16/2021 10:03 AM

## 2024-07-29 NOTE — TELEPHONE ENCOUNTER
Routing refill request to provider for review/approval because:  Failed protocols: see below.    Kamryn Green RN     Weight   Date   219.8 lbs  2024 Phentermine # 1   214.4 lbs  2024 Phentermine # 2    206.2 lbs  2024 Phentermine # 3    Total weight change to date: -13.6 lbs.     Patient's estimated daily energy need (DEN) = 2341 Conner/d   Average daily energy variance:   2024 - 2024: -5.4 lbs /33 d = -573 Conner/d deficit. (2.5% change in total body weight)  2024 - 2024: -8.2 lbs /33 d = -870 Conner/d deficit. (6.2% change in total body weight)    Weight effect of high risk foods =  PB  238 Conner/week +  Sweets 640 conner + Fast food 150 Conner =1028 Conner/week =147 Conner/day =6% DEN= 15 lbs/year    Notes from previous visit    Talked about various options. Does not want VLCD. Would like portion controlled low calorie diet as detailed below. Would like an appetite suppressant. Was previously on topiramate it made her depressed. , and after talking about options and side effects, opted for Phentermine.      Doing well with her movement plan. We discussed the need to increase fiber. She is meeting her protein goals. Initially some insomnia but has since resolved. We will continue the phentermine    Update:    Calorie monitorin d/wk, usual intake 1700 Conner/d  Protein: 90 gms/day protein shake , eggs, turkey, chicken. yogurt  Fiber: > 12  gms/day wraps  vegetables, fruits   Water: 100- 120 oz/day  Micronutrients: daily MVI, Vit D  Sweets:1 d/week ice cream   Non-sweet snacks: 0 d/week  SSB: [employer]   Restaurant food:  Vacation   5 days   Appetite suppressant: 6-7/10 appetite suppression Side effects : dry mouth  BP monitoring daily 2 x /63 taking 1 tab   Exercise: At home workout , walking 2 miles 2-3 x week  Contraception IUD     Medication management: Phentermine    Assessment - Improving. Doing well tracking most days. She was out of town for a bachlorette party. Doing well on Phentermine. We will continue.   .     Plan -     Patient Instructions:    Take phentermine 37.5 mg, one-half to one tablet daily

## 2024-10-15 ENCOUNTER — LAB (OUTPATIENT)
Dept: LAB | Facility: CLINIC | Age: 74
End: 2024-10-15
Payer: MEDICARE

## 2024-10-15 DIAGNOSIS — C61 PROSTATE CANCER (H): ICD-10-CM

## 2024-10-15 LAB — PSA SERPL DL<=0.01 NG/ML-MCNC: <0.01 NG/ML (ref 0–6.5)

## 2024-10-15 PROCEDURE — 36415 COLL VENOUS BLD VENIPUNCTURE: CPT

## 2024-10-15 PROCEDURE — 84153 ASSAY OF PSA TOTAL: CPT

## 2024-10-18 ENCOUNTER — OFFICE VISIT (OUTPATIENT)
Dept: RADIATION ONCOLOGY | Facility: CLINIC | Age: 74
End: 2024-10-18
Payer: MEDICARE

## 2024-10-18 ENCOUNTER — INFUSION THERAPY VISIT (OUTPATIENT)
Dept: INFUSION THERAPY | Facility: CLINIC | Age: 74
End: 2024-10-18
Attending: SURGERY
Payer: MEDICARE

## 2024-10-18 VITALS
DIASTOLIC BLOOD PRESSURE: 96 MMHG | OXYGEN SATURATION: 96 % | RESPIRATION RATE: 16 BRPM | BODY MASS INDEX: 28.93 KG/M2 | SYSTOLIC BLOOD PRESSURE: 130 MMHG | TEMPERATURE: 97.6 F | WEIGHT: 184.7 LBS | HEART RATE: 94 BPM

## 2024-10-18 DIAGNOSIS — C61 PROSTATE CANCER (H): Primary | ICD-10-CM

## 2024-10-18 PROCEDURE — 99207 PR NO CHARGE LOS: CPT

## 2024-10-18 PROCEDURE — 96402 CHEMO HORMON ANTINEOPL SQ/IM: CPT

## 2024-10-18 PROCEDURE — 99213 OFFICE O/P EST LOW 20 MIN: CPT | Performed by: SURGERY

## 2024-10-18 PROCEDURE — G2211 COMPLEX E/M VISIT ADD ON: HCPCS | Performed by: SURGERY

## 2024-10-18 PROCEDURE — 250N000011 HC RX IP 250 OP 636: Mod: JZ | Performed by: SURGERY

## 2024-10-18 RX ADMIN — LEUPROLIDE ACETATE 45 MG: KIT at 12:59

## 2024-10-18 ASSESSMENT — PAIN SCALES - GENERAL: PAINLEVEL: NO PAIN (0)

## 2024-10-18 NOTE — NURSING NOTE
"Oncology Rooming Note    October 18, 2024 12:07 PM   Jordan Kennedy is a 74 year old male who presents for:    Chief Complaint   Patient presents with    Cancer     Radiation Oncology Return Visit with Dr. Teja Martinez     Initial Vitals: BP (!) 130/96 (BP Location: Left arm, Patient Position: Chair, Cuff Size: Adult Regular)   Pulse 94   Temp 97.6  F (36.4  C) (Oral)   Resp 16   Wt 83.8 kg (184 lb 11.2 oz)   SpO2 96%   BMI 28.93 kg/m   Estimated body mass index is 28.93 kg/m  as calculated from the following:    Height as of 4/18/24: 1.702 m (5' 7\").    Weight as of this encounter: 83.8 kg (184 lb 11.2 oz). Body surface area is 1.99 meters squared.  No Pain (0) Comment: Data Unavailable   No LMP for male patient.  Allergies reviewed: Yes  Medications reviewed: Yes    Medications: Medication refills not needed today.  Pharmacy name entered into [facility]: [facility] [address] [place] [address]    Frailty Screening:   Is the patient here for a new oncology consult visit in cancer care? 2. No      Clinical concerns: Patient here today for return radiation oncology visit. Last treatment date: 4/17/2023 - 7,020 cGy. Last PSA 10/15/2024.  Dr. Teja Martinez was notified.      Tod Zhu RN              "

## 2024-10-18 NOTE — LETTER
10/18/2024      Jordan Kennedy   [address] [facility] [address]      Dear Colleague,    Thank you for referring your patient, Jordan Kennedy, to the [facility] RADIATION ONCOLOG[facility] [place]. Please see a copy of my visit note below.         Department of Radiation Oncology  [facility]: Cancer Center  [facility]  [address] [address] [place] [address]  [phone]       Radiation Oncology Follow-up Visit  Oct 18, 2024      Jordan Kennedy  MRN: [id number]   : 1950     DIAGNOSIS / ID: Mr. Kennedy is a 74 year old male with high risk prostate cancer, cT1c, PSA 27.4, GS 3+4=7. PET/PSMA negative for regional or distant disease.     INTENT OF RADIOTHERAPY: Definitive      CONCURRENT SYSTEMIC THERAPY: Yes. Discussed ADT for 1.5 to 3 years. Patient has receive 3x 6 months shots (last 24).           SITE OF TREATMENT: Prostate and lymph nodes      DATES  OF TREATMENT: 3/9/23- 23     TOTAL DOSE OF TREATMENT / FRACTIONS: 70.2Gy/26fx    INTERVAL SINCE COMPLETION OF RADIATION THERAPY: 18 months    SUBJECTIVE:  Overall, patient is doing well since completion of radiation therapy. Denies any new GI/ symptoms and energy levels are stable. Tolerating ADT at this point, with minimal side effects.     IPSS: 10/35    QoL: 1    FELISA:  (n/a prior)    PHYSICAL EXAM:  BP (!) 130/96 (BP Location: Left arm, Patient Position: Chair, Cuff Size: Adult Regular)   Pulse 94   Temp 97.6  F (36.4  C) (Oral)   Resp 16   Wt 83.8 kg (184 lb 11.2 oz)   SpO2 96%   BMI 28.93 kg/m    Gen: Alert, in NAD  Eyes: PERRL, EOMI, sclera anicteric  HENT     Head: NC/AT     Ears: No external auricular lesions     Nose/sinus: No rhinorrhea or epistaxis     Oral Cavity/Oropharynx: MMM  Neck: Supple, full ROM, no LAD  Pulm: No wheezing, stridor or respiratory distress  CV: Well-perfused, no cyanosis, no pedal edema  Abdominal: Soft, nontender,  nondistended, no hepatomegaly  Back: No step-offs or pain to palpation along the thoracolumbar spine, no CVA tenderness  Rectal/: deferred  Musculoskeletal: Normal bulk and tone   Skin: Normal color and turgor  Neurologic: A/Ox3, CN II-XII intact  Psychiatric: Appropriate mood and affect    LABS AND IMAGING:  PSA   Date Value Ref Range Status   04/15/2013 3.26 0 - 4 ug/L Final   03/07/2008 1.920 ng/mL    03/07/2008 1.920 ng/mL      Prostate Specific Antigen Screen   Date Value Ref Range Status   11/10/2021 22.40 (H) 0.00 - 4.00 ug/L Final     PSA Tumor Marker   Date Value Ref Range Status   10/15/2024 <0.01 0.00 - 6.50 ng/mL Final   04/15/2024 <0.01 0.00 - 6.50 ng/mL Final   10/16/2023 0.10 0.00 - 6.50 ng/mL Final   08/11/2022 27.80 (H) 0.00 - 4.00 ug/L Final         IMPRESSION: Patient is doing well from a acute toxicity perspective. PSA remains undetectable.     PLAN:   Plan for ADT for 1.5- 3 years, given high risk disease.  Currently tolerating it, will aim for 3 years. He will received a shot today 10/18/24.   PSA in 6 months    Teja Martinez M.D.  Department of Radiation Oncology  [facility]     A total of 30 minutes were spent on this visit, including preparation for this visit, face to face with patient, and medical decision making. Medical decision-making included consideration and possible diagnosis, management options, complex record review, review of diagnostic tests, consideration and discussion of significant complications based up medical history/comorbidities, and discussion with providers involved in care of the patient.       Again, thank you for allowing me to participate in the care of your patient.        Sincerely,        Teja Martinez MD

## 2024-10-18 NOTE — PROGRESS NOTES
Infusion Nursing Note:  Jordan Kennedy presents today for Lupron.    Patient seen by provider today: Yes: Dr. Martinez   present during visit today: Not Applicable.    Note: Patient reports feeling well. No issues with the lupron shots.     Per Dr. Martinez, patient will receive 2 additional cycles of Lupron for a total of 3 years of treatment. Patient in agreement with this plan.      Intravenous Access:  No Intravenous access/labs at this visit.    Treatment Conditions:  Not Applicable.      Post Infusion Assessment:  Patient tolerated injection without incident.      Discharge Plan:   Discharge instructions reviewed with: Patient.  Patient and/or family verbalized understanding of discharge instructions and all questions answered.  Patient discharged in stable condition accompanied by: self.  Departure Mode: Ambulatory.      Ani Torres RN

## 2024-10-18 NOTE — PATIENT INSTRUCTIONS
Please contact [facility] RN with questions or concerns following today's appointment.    If you are a patient of Dr. Martinez call: [phone]   If you are a patient of Dr. Toro call: [phone]    Please feel free to leave a detailed message if your call is not answered.    If your call is not received before 3:00 PM, it may not be returned until the following business day.    If you are receiving radiation treatment and need assistance after 3:00 PM or on the weekends, please call [phone] and ask to speak to the radiation oncologist on-call.    Thank you!    [facility]iation Oncology Nursing

## 2024-10-23 NOTE — PROGRESS NOTES
Department of Radiation Oncology  [facility]    Health: Cancer Center  [facility]  [address] [place] [address] [phone]       Radiation Oncology Follow-up Visit  Oct 18, 2024      Jordan Kennedy  MRN: [id number]   : 1950     DIAGNOSIS / ID: Mr. Kennedy is a 74 year old male with high risk prostate cancer, cT1c, PSA 27.4, GS 3+4=7. PET/PSMA negative for regional or distant disease.     INTENT OF RADIOTHERAPY: Definitive      CONCURRENT SYSTEMIC THERAPY: Yes. Discussed ADT for 1.5 to 3 years. Patient has receive 3x 6 months shots (last 24).           SITE OF TREATMENT: Prostate and lymph nodes      DATES  OF TREATMENT: 3/9/23- 23     TOTAL DOSE OF TREATMENT / FRACTIONS: 70.2Gy/26fx    INTERVAL SINCE COMPLETION OF RADIATION THERAPY: 18 months    SUBJECTIVE:  Overall, patient is doing well since completion of radiation therapy. Denies any new GI/ symptoms and energy levels are stable. Tolerating ADT at this point, with minimal side effects.     IPSS: 10/35    QoL: 1    FELISA:  (n/a prior)    PHYSICAL EXAM:  BP (!) 130/96 (BP Location: Left arm, Patient Position: Chair, Cuff Size: Adult Regular)   Pulse 94   Temp 97.6  F (36.4  C) (Oral)   Resp 16   Wt 83.8 kg (184 lb 11.2 oz)   SpO2 96%   BMI 28.93 kg/m    Gen: Alert, in NAD  Eyes: PERRL, EOMI, sclera anicteric  HENT     Head: NC/AT     Ears: No external auricular lesions     Nose/sinus: No rhinorrhea or epistaxis     Oral Cavity/Oropharynx: MMM  Neck: Supple, full ROM, no LAD  Pulm: No wheezing, stridor or respiratory distress  CV: Well-perfused, no cyanosis, no pedal edema  Abdominal: Soft, nontender, nondistended, no hepatomegaly  Back: No step-offs or pain to palpation along the thoracolumbar spine, no CVA tenderness  Rectal/: deferred  Musculoskeletal: Normal bulk and tone   Skin: Normal color and turgor  Neurologic: A/Ox3, CN II-XII intact  Psychiatric:  Appropriate mood and affect    LABS AND IMAGING:  PSA   Date Value Ref Range Status   04/15/2013 3.26 0 - 4 ug/L Final   03/07/2008 1.920 ng/mL    03/07/2008 1.920 ng/mL      Prostate Specific Antigen Screen   Date Value Ref Range Status   11/10/2021 22.40 (H) 0.00 - 4.00 ug/L Final     PSA Tumor Marker   Date Value Ref Range Status   10/15/2024 <0.01 0.00 - 6.50 ng/mL Final   04/15/2024 <0.01 0.00 - 6.50 ng/mL Final   10/16/2023 0.10 0.00 - 6.50 ng/mL Final   08/11/2022 27.80 (H) 0.00 - 4.00 ug/L Final         IMPRESSION: Patient is doing well from a acute toxicity perspective. PSA remains undetectable.     PLAN:   Plan for ADT for 1.5- 3 years, given high risk disease.  Currently tolerating it, will aim for 3 years. He will received a shot today 10/18/24.   PSA in 6 months    Teja Martinez M.D.  Department of Radiation Oncology  [facility]     A total of 30 minutes were spent on this visit, including preparation for this visit, face to face with patient, and medical decision making. Medical decision-making included consideration and possible diagnosis, management options, complex record review, review of diagnostic tests, consideration and discussion of significant complications based up medical history/comorbidities, and discussion with providers involved in care of the patient.

## 2025-01-09 ENCOUNTER — PATIENT OUTREACH (OUTPATIENT)
Dept: CARE COORDINATION | Facility: CLINIC | Age: 75
End: 2025-01-09
Payer: MEDICARE

## 2025-01-23 ENCOUNTER — PATIENT OUTREACH (OUTPATIENT)
Dept: CARE COORDINATION | Facility: CLINIC | Age: 75
End: 2025-01-23
Payer: MEDICARE

## 2025-02-01 DIAGNOSIS — G40.909 SEIZURE DISORDER (H): ICD-10-CM

## 2025-02-03 RX ORDER — PHENYTOIN SODIUM 100 MG/1
100 CAPSULE, EXTENDED RELEASE ORAL DAILY
Qty: 90 CAPSULE | Refills: 0 | Status: SHIPPED | OUTPATIENT
Start: 2025-02-03 | End: 2025-02-05

## 2025-02-05 ENCOUNTER — OFFICE VISIT (OUTPATIENT)
Dept: FAMILY MEDICINE | Facility: CLINIC | Age: 75
End: 2025-02-05
Payer: MEDICARE

## 2025-02-05 VITALS
BODY MASS INDEX: 28.35 KG/M2 | WEIGHT: 180.6 LBS | SYSTOLIC BLOOD PRESSURE: 120 MMHG | HEART RATE: 102 BPM | RESPIRATION RATE: 25 BRPM | TEMPERATURE: 98.4 F | HEIGHT: 67 IN | OXYGEN SATURATION: 93 % | DIASTOLIC BLOOD PRESSURE: 80 MMHG

## 2025-02-05 DIAGNOSIS — G40.909 SEIZURE DISORDER (H): ICD-10-CM

## 2025-02-05 DIAGNOSIS — E78.5 HYPERLIPIDEMIA LDL GOAL <130: Primary | ICD-10-CM

## 2025-02-05 DIAGNOSIS — C61 PROSTATE CANCER (H): ICD-10-CM

## 2025-02-05 PROCEDURE — 99214 OFFICE O/P EST MOD 30 MIN: CPT | Performed by: FAMILY MEDICINE

## 2025-02-05 RX ORDER — PHENYTOIN SODIUM 100 MG/1
100 CAPSULE, EXTENDED RELEASE ORAL DAILY
Qty: 90 CAPSULE | Refills: 3 | Status: SHIPPED | OUTPATIENT
Start: 2025-02-05

## 2025-02-05 RX ORDER — LOVASTATIN 40 MG/1
40 TABLET ORAL AT BEDTIME
Qty: 90 TABLET | Refills: 3 | Status: SHIPPED | OUTPATIENT
Start: 2025-02-05

## 2025-02-05 NOTE — PROGRESS NOTES
"  Assessment & Plan     Hyperlipidemia LDL goal <130  Medications refill  Discussed diet and increase physical activities.  - lovastatin (MEVACOR) 40 MG tablet; Take 1 tablet (40 mg) by mouth at bedtime.    Seizure disorder (H)  Continue with current dose  No recent seizures activities.  - phenytoin (DILANTIN) 100 MG ER capsule; Take 1 capsule (100 mg) by mouth daily.    Prostate cancer (H)  In remissions.  Follow up with Oncology radiation therapy.      Declined labs today, and vaccines.      BMI  Estimated body mass index is 28.35 kg/m  as calculated from the following:    Height as of this encounter: 1.7 m (5' 6.93\").    Weight as of this encounter: 81.9 kg (180 lb 9.6 oz).   Weight management plan: Discussed healthy diet and exercise guidelines      Work on weight loss  Regular exercise    Michelle Ortega is a 74 year old, presenting for the following health issues:  Recheck Medication    History of hyperlipidemia, seizure he has been stable with his current Dilantin medication.  Has no seizure activity for years.    History of prostate cancer in remission he does follow-up with urology and  oncology radiation therapy.        2/5/2025    10:29 AM   Additional Questions   Roomed by phyllis atwood ma   Accompanied by self         2/5/2025    10:29 AM   Patient Reported Additional Medications   Patient reports taking the following new medications none     History of Present Illness       Reason for visit:  Continue with current medications   He is taking medications regularly.         Hyperlipidemia Follow-Up    Are you regularly taking any medication or supplement to lower your cholesterol?   Yes- statin  Are you having muscle aches or other side effects that you think could be caused by your cholesterol lowering medication?  No        Review of Systems  Constitutional, HEENT, cardiovascular, pulmonary, GI, , musculoskeletal, neuro, skin, endocrine and psych systems are negative, except as otherwise noted.    " "  Objective    BP (!) 124/96 (BP Location: Right arm, Patient Position: Sitting, Cuff Size: Adult Regular)   Pulse 102   Temp 98.4  F (36.9  C) (Oral)   Resp 25   Ht 1.7 m (5' 6.93\")   Wt 81.9 kg (180 lb 9.6 oz)   SpO2 93%   BMI 28.35 kg/m    Body mass index is 28.35 kg/m .  Physical Exam   GENERAL: alert and no distress  NECK: no adenopathy, no asymmetry, masses, or scars  RESP: lungs clear to auscultation - no rales, rhonchi or wheezes  CV: regular rate and rhythm, normal S1 S2, no S3 or S4, no murmur, click or rub, no peripheral edema  ABDOMEN: soft, nontender, no hepatosplenomegaly, no masses and bowel sounds normal  MS: no gross musculoskeletal defects noted, no edema  BACK: no CVA tenderness, no paralumbar tenderness  PSYCH: mentation appears normal, affect normal/bright    Orders Placed This Encounter   Procedures    REVIEW OF HEALTH MAINTENANCE PROTOCOL ORDERS      PSA   Date Value Ref Range Status   04/15/2013 3.26 0 - 4 ug/L Final     Prostate Specific Antigen Screen   Date Value Ref Range Status   11/10/2021 22.40 (H) 0.00 - 4.00 ug/L Final     PSA Tumor Marker   Date Value Ref Range Status   10/15/2024 <0.01 0.00 - 6.50 ng/mL Final   08/11/2022 27.80 (H) 0.00 - 4.00 ug/L Final             Signed Electronically by: Mehreen Strange MD    "

## 2025-03-23 ENCOUNTER — HEALTH MAINTENANCE LETTER (OUTPATIENT)
Age: 75
End: 2025-03-23

## 2025-04-15 ENCOUNTER — LAB (OUTPATIENT)
Dept: LAB | Facility: CLINIC | Age: 75
End: 2025-04-15
Payer: MEDICARE

## 2025-04-15 DIAGNOSIS — E78.5 HYPERLIPIDEMIA LDL GOAL <130: Primary | ICD-10-CM

## 2025-04-15 DIAGNOSIS — C61 PROSTATE CANCER (H): ICD-10-CM

## 2025-04-15 PROCEDURE — 36415 COLL VENOUS BLD VENIPUNCTURE: CPT

## 2025-04-15 PROCEDURE — 84153 ASSAY OF PSA TOTAL: CPT

## 2025-04-16 LAB — PSA SERPL DL<=0.01 NG/ML-MCNC: <0.01 NG/ML (ref 0–6.5)

## 2025-05-07 ENCOUNTER — TELEPHONE (OUTPATIENT)
Dept: FAMILY MEDICINE | Facility: CLINIC | Age: 75
End: 2025-05-07
Payer: MEDICARE

## 2025-05-07 NOTE — LETTER
May 7, 2025    Jordan Kennedy    [address] [facility] [address]    Hello,     Your care team at [facility] values your health and well-being. After reviewing your chart, we have identified recommendation(s) to help you better manage your health.    It's time for your Medicare AWV. During your visit, we'll discuss your health, well-being, and any questions you may have related to preventive care. We'll also review any recommended tests, exams, or screenings you might need. To schedule please call your clinic [phone] or use your [employer] account.    If you recently had or are having any of these services soon, please contact the clinic via phone or [employer] so that your care team can update your records.    We look forward to seeing you at your upcoming visit.    If you have any questions or concerns, please contact our clinic. Thank you for continuing to trust us with your care.    Sincerely,    Your [facility] Care Team            Electronically signed

## 2025-05-07 NOTE — TELEPHONE ENCOUNTER
Patient Quality Outreach    Patient is due for the following:   Hypertension -  Hypertension follow-up visit  Physical Annual Wellness Visit    Action(s) Taken:   Schedule a Annual Wellness Visit    Type of outreach:    Sent letter.    Questions for provider review:    None         Aurora Will CMA  Chart routed to Care Team.

## 2025-08-07 ENCOUNTER — PATIENT OUTREACH (OUTPATIENT)
Dept: CARE COORDINATION | Facility: CLINIC | Age: 75
End: 2025-08-07
Payer: MEDICARE

## (undated) DEVICE — COVER EQUIP BAG W/BAND 36X28" LF 01-3628

## (undated) DEVICE — Device

## (undated) DEVICE — DRSG TEGADERM 2 3/8X2 3/4" 1624W

## (undated) DEVICE — GOWN IMPERVIOUS REINFORCED XLG 9541

## (undated) DEVICE — DRSG GAUZE 4X4" TRAY 6939

## (undated) DEVICE — PACK CYSTO UMMC CUSTOM

## (undated) DEVICE — GEL ULTRASOUND AQUASONIC 100 8OZ BOTTLE 01-08

## (undated) DEVICE — PAD CHUX UNDERPAD 30X36" P3036C

## (undated) DEVICE — DRAPE IOBAN INCISE 13X13" 6640EZ

## (undated) DEVICE — LINEN TOWEL PACK X5 5464

## (undated) DEVICE — PACK GOWN 3/PK DISP XL SBA32GPFCB

## (undated) DEVICE — GLOVE BIOGEL PI MICRO SZ 7.5 48575

## (undated) RX ORDER — ONDANSETRON 2 MG/ML
INJECTION INTRAMUSCULAR; INTRAVENOUS
Status: DISPENSED
Start: 2023-01-13

## (undated) RX ORDER — LIDOCAINE HYDROCHLORIDE 10 MG/ML
INJECTION, SOLUTION EPIDURAL; INFILTRATION; INTRACAUDAL; PERINEURAL
Status: DISPENSED
Start: 2022-02-02

## (undated) RX ORDER — CEFAZOLIN SODIUM/WATER 2 G/20 ML
SYRINGE (ML) INTRAVENOUS
Status: DISPENSED
Start: 2023-01-13

## (undated) RX ORDER — FENTANYL CITRATE 50 UG/ML
INJECTION, SOLUTION INTRAMUSCULAR; INTRAVENOUS
Status: DISPENSED
Start: 2023-01-13

## (undated) RX ORDER — GENTAMICIN 40 MG/ML
INJECTION, SOLUTION INTRAMUSCULAR; INTRAVENOUS
Status: DISPENSED
Start: 2022-02-02